# Patient Record
Sex: MALE | Race: WHITE | NOT HISPANIC OR LATINO
[De-identification: names, ages, dates, MRNs, and addresses within clinical notes are randomized per-mention and may not be internally consistent; named-entity substitution may affect disease eponyms.]

---

## 2017-03-07 ENCOUNTER — RX RENEWAL (OUTPATIENT)
Age: 47
End: 2017-03-07

## 2017-03-13 ENCOUNTER — APPOINTMENT (OUTPATIENT)
Dept: INTERNAL MEDICINE | Facility: CLINIC | Age: 47
End: 2017-03-13

## 2017-03-13 VITALS
HEART RATE: 68 BPM | TEMPERATURE: 98.2 F | BODY MASS INDEX: 23.84 KG/M2 | RESPIRATION RATE: 18 BRPM | OXYGEN SATURATION: 95 % | WEIGHT: 176 LBS | HEIGHT: 72 IN | DIASTOLIC BLOOD PRESSURE: 76 MMHG | SYSTOLIC BLOOD PRESSURE: 116 MMHG

## 2017-03-13 DIAGNOSIS — Z86.69 PERSONAL HISTORY OF OTHER DISEASES OF THE NERVOUS SYSTEM AND SENSE ORGANS: ICD-10-CM

## 2017-03-28 ENCOUNTER — RX RENEWAL (OUTPATIENT)
Age: 47
End: 2017-03-28

## 2017-04-12 ENCOUNTER — APPOINTMENT (OUTPATIENT)
Dept: HEMATOLOGY ONCOLOGY | Facility: CLINIC | Age: 47
End: 2017-04-12

## 2017-04-18 ENCOUNTER — APPOINTMENT (OUTPATIENT)
Dept: HEMATOLOGY ONCOLOGY | Facility: CLINIC | Age: 47
End: 2017-04-18

## 2017-04-18 RX ORDER — DOXYCYCLINE HYCLATE 100 MG/1
100 TABLET ORAL TWICE DAILY
Qty: 20 | Refills: 0 | Status: DISCONTINUED | COMMUNITY
End: 2017-04-18

## 2017-04-21 LAB
ALBUMIN SERPL ELPH-MCNC: 4.9 G/DL
ALP BLD-CCNC: 74 U/L
ALT SERPL-CCNC: 44 U/L
ANION GAP SERPL CALC-SCNC: 14 MMOL/L
AST SERPL-CCNC: 29 U/L
BILIRUB SERPL-MCNC: 0.6 MG/DL
BUN SERPL-MCNC: 23 MG/DL
CALCIUM SERPL-MCNC: 10 MG/DL
CHLORIDE SERPL-SCNC: 100 MMOL/L
CO2 SERPL-SCNC: 25 MMOL/L
CREAT SERPL-MCNC: 1.05 MG/DL
GLUCOSE SERPL-MCNC: 94 MG/DL
LDH SERPL-CCNC: 151 U/L
POTASSIUM SERPL-SCNC: 4.7 MMOL/L
PROT SERPL-MCNC: 6.4 G/DL
SODIUM SERPL-SCNC: 139 MMOL/L
URATE SERPL-MCNC: 4.2 MG/DL

## 2017-05-12 ENCOUNTER — APPOINTMENT (OUTPATIENT)
Dept: INTERNAL MEDICINE | Facility: CLINIC | Age: 47
End: 2017-05-12

## 2017-05-12 ENCOUNTER — LABORATORY RESULT (OUTPATIENT)
Age: 47
End: 2017-05-12

## 2017-05-12 VITALS
OXYGEN SATURATION: 96 % | DIASTOLIC BLOOD PRESSURE: 72 MMHG | RESPIRATION RATE: 18 BRPM | SYSTOLIC BLOOD PRESSURE: 120 MMHG | TEMPERATURE: 98.3 F | HEIGHT: 72 IN | BODY MASS INDEX: 23.7 KG/M2 | WEIGHT: 175 LBS | HEART RATE: 62 BPM

## 2017-05-12 DIAGNOSIS — Z87.01 PERSONAL HISTORY OF PNEUMONIA (RECURRENT): ICD-10-CM

## 2017-05-12 DIAGNOSIS — Z20.828 CONTACT WITH AND (SUSPECTED) EXPOSURE TO OTHER VIRAL COMMUNICABLE DISEASES: ICD-10-CM

## 2017-05-12 DIAGNOSIS — R05 COUGH: ICD-10-CM

## 2017-05-12 DIAGNOSIS — Z87.898 PERSONAL HISTORY OF OTHER SPECIFIED CONDITIONS: ICD-10-CM

## 2017-05-12 DIAGNOSIS — M79.1 MYALGIA: ICD-10-CM

## 2017-05-12 RX ORDER — METHYLPREDNISOLONE 4 MG/1
4 TABLET ORAL
Qty: 21 | Refills: 0 | Status: DISCONTINUED | COMMUNITY
Start: 2017-03-13 | End: 2017-05-12

## 2017-05-12 RX ORDER — OSELTAMIVIR PHOSPHATE 75 MG/1
75 CAPSULE ORAL
Qty: 10 | Refills: 0 | Status: DISCONTINUED | COMMUNITY
Start: 2017-03-11 | End: 2017-05-12

## 2017-05-13 LAB
25(OH)D3 SERPL-MCNC: 35.5 NG/ML
ALBUMIN SERPL ELPH-MCNC: 5 G/DL
ALP BLD-CCNC: 72 U/L
ALT SERPL-CCNC: 27 U/L
ANION GAP SERPL CALC-SCNC: 15 MMOL/L
APTT BLD: 32.9 SEC
AST SERPL-CCNC: 23 U/L
BILIRUB SERPL-MCNC: 0.6 MG/DL
BUN SERPL-MCNC: 25 MG/DL
CALCIUM SERPL-MCNC: 9.4 MG/DL
CHLORIDE SERPL-SCNC: 101 MMOL/L
CO2 SERPL-SCNC: 26 MMOL/L
CREAT SERPL-MCNC: 0.89 MG/DL
GLUCOSE SERPL-MCNC: 94 MG/DL
INR PPP: 0.92 RATIO
POTASSIUM SERPL-SCNC: 4.5 MMOL/L
PROT SERPL-MCNC: 6.4 G/DL
PT BLD: 10.4 SEC
SODIUM SERPL-SCNC: 142 MMOL/L

## 2017-05-15 LAB
BASOPHILS # BLD AUTO: 0 K/UL
BASOPHILS NFR BLD AUTO: 0 %
EOSINOPHIL # BLD AUTO: 0 K/UL
EOSINOPHIL NFR BLD AUTO: 0 %
HCT VFR BLD CALC: 43 %
HGB BLD-MCNC: 13.4 G/DL
LYMPHOCYTES # BLD AUTO: 78.23 K/UL
LYMPHOCYTES NFR BLD AUTO: 86 %
MAN DIFF?: YES
MCHC RBC-ENTMCNC: 27.4 PG
MCHC RBC-ENTMCNC: 31.2 GM/DL
MCV RBC AUTO: 87.9 FL
MONOCYTES # BLD AUTO: 0 K/UL
MONOCYTES NFR BLD AUTO: 0 %
NEUTROPHILS # BLD AUTO: 8.19 K/UL
NEUTROPHILS NFR BLD AUTO: 9 %
PLATELET # BLD AUTO: 171 K/UL
RBC # BLD: 4.89 M/UL
RBC # FLD: 14.5 %
WBC # FLD AUTO: 90.96 K/UL

## 2017-05-31 ENCOUNTER — APPOINTMENT (OUTPATIENT)
Dept: INTERNAL MEDICINE | Facility: CLINIC | Age: 47
End: 2017-05-31

## 2017-05-31 VITALS
TEMPERATURE: 98 F | DIASTOLIC BLOOD PRESSURE: 76 MMHG | HEIGHT: 72 IN | SYSTOLIC BLOOD PRESSURE: 132 MMHG | RESPIRATION RATE: 16 BRPM | HEART RATE: 66 BPM | OXYGEN SATURATION: 96 %

## 2017-07-04 ENCOUNTER — RX RENEWAL (OUTPATIENT)
Age: 47
End: 2017-07-04

## 2017-08-27 ENCOUNTER — RX RENEWAL (OUTPATIENT)
Age: 47
End: 2017-08-27

## 2017-10-18 ENCOUNTER — APPOINTMENT (OUTPATIENT)
Dept: HEMATOLOGY ONCOLOGY | Facility: CLINIC | Age: 47
End: 2017-10-18
Payer: COMMERCIAL

## 2017-10-18 VITALS
RESPIRATION RATE: 14 BRPM | WEIGHT: 180 LBS | DIASTOLIC BLOOD PRESSURE: 68 MMHG | OXYGEN SATURATION: 98 % | HEART RATE: 77 BPM | TEMPERATURE: 98 F | SYSTOLIC BLOOD PRESSURE: 114 MMHG | BODY MASS INDEX: 24.41 KG/M2

## 2017-10-18 PROCEDURE — 85025 COMPLETE CBC W/AUTO DIFF WBC: CPT

## 2017-10-18 PROCEDURE — 99215 OFFICE O/P EST HI 40 MIN: CPT

## 2017-10-18 RX ORDER — UBIDECARENONE 50 MG
CAPSULE ORAL
Refills: 0 | Status: ACTIVE | COMMUNITY

## 2017-10-19 LAB
ALBUMIN SERPL ELPH-MCNC: 5.1 G/DL
ALP BLD-CCNC: 73 U/L
ALT SERPL-CCNC: 40 U/L
ANION GAP SERPL CALC-SCNC: 17 MMOL/L
AST SERPL-CCNC: 28 U/L
BILIRUB SERPL-MCNC: 0.6 MG/DL
BUN SERPL-MCNC: 23 MG/DL
CALCIUM SERPL-MCNC: 9.9 MG/DL
CHLORIDE SERPL-SCNC: 104 MMOL/L
CO2 SERPL-SCNC: 21 MMOL/L
CREAT SERPL-MCNC: 1.13 MG/DL
DEPRECATED KAPPA LC FREE/LAMBDA SER: 11.95 RATIO
GLUCOSE SERPL-MCNC: 84 MG/DL
IGA SER QL IEP: 23 MG/DL
IGG SER QL IEP: 371 MG/DL
IGM SER QL IEP: 19 MG/DL
KAPPA LC CSF-MCNC: 0.55 MG/DL
KAPPA LC SERPL-MCNC: 6.57 MG/DL
LDH SERPL-CCNC: 162 U/L
POTASSIUM SERPL-SCNC: 4.6 MMOL/L
PROT SERPL-MCNC: 6.4 G/DL
SODIUM SERPL-SCNC: 142 MMOL/L
URATE SERPL-MCNC: 4.9 MG/DL

## 2017-12-03 ENCOUNTER — RX RENEWAL (OUTPATIENT)
Age: 47
End: 2017-12-03

## 2017-12-04 ENCOUNTER — RX RENEWAL (OUTPATIENT)
Age: 47
End: 2017-12-04

## 2018-02-28 ENCOUNTER — RX RENEWAL (OUTPATIENT)
Age: 48
End: 2018-02-28

## 2018-04-18 ENCOUNTER — APPOINTMENT (OUTPATIENT)
Dept: HEMATOLOGY ONCOLOGY | Facility: CLINIC | Age: 48
End: 2018-04-18
Payer: COMMERCIAL

## 2018-04-18 VITALS
BODY MASS INDEX: 24.28 KG/M2 | WEIGHT: 179 LBS | HEART RATE: 71 BPM | DIASTOLIC BLOOD PRESSURE: 78 MMHG | TEMPERATURE: 97.8 F | SYSTOLIC BLOOD PRESSURE: 112 MMHG | OXYGEN SATURATION: 98 % | RESPIRATION RATE: 14 BRPM

## 2018-04-18 PROCEDURE — 99215 OFFICE O/P EST HI 40 MIN: CPT

## 2018-04-18 PROCEDURE — 85025 COMPLETE CBC W/AUTO DIFF WBC: CPT

## 2018-04-23 LAB
ALBUMIN SERPL ELPH-MCNC: 5 G/DL
ALP BLD-CCNC: 69 U/L
ALT SERPL-CCNC: 32 U/L
ANION GAP SERPL CALC-SCNC: 13 MMOL/L
AST SERPL-CCNC: 27 U/L
BILIRUB SERPL-MCNC: 0.5 MG/DL
BUN SERPL-MCNC: 31 MG/DL
CALCIUM SERPL-MCNC: 9.8 MG/DL
CHLORIDE SERPL-SCNC: 101 MMOL/L
CO2 SERPL-SCNC: 26 MMOL/L
CREAT SERPL-MCNC: 1.15 MG/DL
GLUCOSE SERPL-MCNC: 84 MG/DL
LDH SERPL-CCNC: 149 U/L
POTASSIUM SERPL-SCNC: 4.5 MMOL/L
PROT SERPL-MCNC: 6.5 G/DL
SODIUM SERPL-SCNC: 140 MMOL/L
URATE SERPL-MCNC: 4.4 MG/DL

## 2018-05-20 ENCOUNTER — RX RENEWAL (OUTPATIENT)
Age: 48
End: 2018-05-20

## 2018-05-24 ENCOUNTER — RX RENEWAL (OUTPATIENT)
Age: 48
End: 2018-05-24

## 2018-07-09 ENCOUNTER — RX RENEWAL (OUTPATIENT)
Age: 48
End: 2018-07-09

## 2018-07-10 ENCOUNTER — MEDICATION RENEWAL (OUTPATIENT)
Age: 48
End: 2018-07-10

## 2018-08-07 ENCOUNTER — RX RENEWAL (OUTPATIENT)
Age: 48
End: 2018-08-07

## 2018-08-09 ENCOUNTER — RX RENEWAL (OUTPATIENT)
Age: 48
End: 2018-08-09

## 2018-08-28 ENCOUNTER — NON-APPOINTMENT (OUTPATIENT)
Age: 48
End: 2018-08-28

## 2018-08-28 ENCOUNTER — APPOINTMENT (OUTPATIENT)
Dept: INTERNAL MEDICINE | Facility: CLINIC | Age: 48
End: 2018-08-28
Payer: COMMERCIAL

## 2018-08-28 VITALS
TEMPERATURE: 97.9 F | HEIGHT: 72 IN | BODY MASS INDEX: 24.11 KG/M2 | OXYGEN SATURATION: 97 % | HEART RATE: 59 BPM | SYSTOLIC BLOOD PRESSURE: 116 MMHG | WEIGHT: 178 LBS | DIASTOLIC BLOOD PRESSURE: 80 MMHG | RESPIRATION RATE: 18 BRPM

## 2018-08-28 DIAGNOSIS — Z87.828 PERSONAL HISTORY OF OTHER (HEALED) PHYSICAL INJURY AND TRAUMA: ICD-10-CM

## 2018-08-28 DIAGNOSIS — Z87.2 PERSONAL HISTORY OF DISEASES OF THE SKIN AND SUBCUTANEOUS TISSUE: ICD-10-CM

## 2018-08-28 DIAGNOSIS — J35.1 HYPERTROPHY OF TONSILS: ICD-10-CM

## 2018-08-28 DIAGNOSIS — R06.83 SNORING: ICD-10-CM

## 2018-08-28 DIAGNOSIS — S83.289A OTHER TEAR OF LATERAL MENISCUS, CURRENT INJURY, UNSPECIFIED KNEE, INITIAL ENCOUNTER: ICD-10-CM

## 2018-08-28 DIAGNOSIS — Z01.818 ENCOUNTER FOR OTHER PREPROCEDURAL EXAMINATION: ICD-10-CM

## 2018-08-28 DIAGNOSIS — R94.5 ABNORMAL RESULTS OF LIVER FUNCTION STUDIES: ICD-10-CM

## 2018-08-28 PROCEDURE — 99396 PREV VISIT EST AGE 40-64: CPT

## 2018-08-28 PROCEDURE — 93000 ELECTROCARDIOGRAM COMPLETE: CPT

## 2018-08-30 ENCOUNTER — LABORATORY RESULT (OUTPATIENT)
Age: 48
End: 2018-08-30

## 2018-08-31 LAB
25(OH)D3 SERPL-MCNC: 38.2 NG/ML
ALBUMIN SERPL ELPH-MCNC: 5 G/DL
ALP BLD-CCNC: 76 U/L
ALT SERPL-CCNC: 44 U/L
ANION GAP SERPL CALC-SCNC: 14 MMOL/L
APPEARANCE: CLEAR
AST SERPL-CCNC: 27 U/L
BASOPHILS # BLD AUTO: 0 K/UL
BASOPHILS NFR BLD AUTO: 0 %
BILIRUB SERPL-MCNC: 0.6 MG/DL
BILIRUBIN URINE: NEGATIVE
BLOOD URINE: NEGATIVE
BUN SERPL-MCNC: 23 MG/DL
CALCIUM SERPL-MCNC: 9.9 MG/DL
CHLORIDE SERPL-SCNC: 102 MMOL/L
CHOLEST SERPL-MCNC: 191 MG/DL
CHOLEST/HDLC SERPL: 4 RATIO
CO2 SERPL-SCNC: 26 MMOL/L
COLOR: YELLOW
CREAT SERPL-MCNC: 0.95 MG/DL
CREAT SPEC-SCNC: 188 MG/DL
EOSINOPHIL # BLD AUTO: 0 K/UL
EOSINOPHIL NFR BLD AUTO: 0 %
GLUCOSE QUALITATIVE U: NEGATIVE MG/DL
GLUCOSE SERPL-MCNC: 90 MG/DL
HCT VFR BLD CALC: 41.5 %
HDLC SERPL-MCNC: 48 MG/DL
HGB BLD-MCNC: 12.9 G/DL
KETONES URINE: NEGATIVE
LDLC SERPL CALC-MCNC: 124 MG/DL
LEUKOCYTE ESTERASE URINE: NEGATIVE
LYMPHOCYTES # BLD AUTO: 117.21 K/UL
LYMPHOCYTES NFR BLD AUTO: 75 %
MAN DIFF?: NORMAL
MCHC RBC-ENTMCNC: 27.3 PG
MCHC RBC-ENTMCNC: 31.1 GM/DL
MCV RBC AUTO: 87.9 FL
MICROALBUMIN 24H UR DL<=1MG/L-MCNC: <1.2 MG/DL
MICROALBUMIN/CREAT 24H UR-RTO: NORMAL
MONOCYTES # BLD AUTO: 10.94 K/UL
MONOCYTES NFR BLD AUTO: 7 %
NEUTROPHILS # BLD AUTO: 10.94 K/UL
NEUTROPHILS NFR BLD AUTO: 7 %
NITRITE URINE: NEGATIVE
PH URINE: 5.5
PLATELET # BLD AUTO: 151 K/UL
POTASSIUM SERPL-SCNC: 4.8 MMOL/L
PROT SERPL-MCNC: 6.5 G/DL
PROTEIN URINE: NEGATIVE MG/DL
PSA SERPL-MCNC: 0.72 NG/ML
RBC # BLD: 4.72 M/UL
RBC # FLD: 14.4 %
SODIUM SERPL-SCNC: 142 MMOL/L
SPECIFIC GRAVITY URINE: 1.03
TRIGL SERPL-MCNC: 93 MG/DL
TSH SERPL-ACNC: 2.86 UIU/ML
UROBILINOGEN URINE: NEGATIVE MG/DL
WBC # FLD AUTO: 156.28 K/UL

## 2018-10-09 ENCOUNTER — LABORATORY RESULT (OUTPATIENT)
Age: 48
End: 2018-10-09

## 2018-10-09 ENCOUNTER — APPOINTMENT (OUTPATIENT)
Dept: HEMATOLOGY ONCOLOGY | Facility: CLINIC | Age: 48
End: 2018-10-09
Payer: COMMERCIAL

## 2018-10-09 PROCEDURE — 99215 OFFICE O/P EST HI 40 MIN: CPT

## 2018-10-09 PROCEDURE — 85025 COMPLETE CBC W/AUTO DIFF WBC: CPT

## 2019-02-19 ENCOUNTER — APPOINTMENT (OUTPATIENT)
Dept: INTERNAL MEDICINE | Facility: CLINIC | Age: 49
End: 2019-02-19
Payer: COMMERCIAL

## 2019-02-19 VITALS
HEIGHT: 72 IN | SYSTOLIC BLOOD PRESSURE: 133 MMHG | HEART RATE: 60 BPM | OXYGEN SATURATION: 97 % | BODY MASS INDEX: 23.84 KG/M2 | WEIGHT: 176 LBS | TEMPERATURE: 97.9 F | RESPIRATION RATE: 18 BRPM | DIASTOLIC BLOOD PRESSURE: 78 MMHG

## 2019-02-19 DIAGNOSIS — Z87.09 PERSONAL HISTORY OF OTHER DISEASES OF THE RESPIRATORY SYSTEM: ICD-10-CM

## 2019-02-19 DIAGNOSIS — Z12.5 ENCOUNTER FOR SCREENING FOR MALIGNANT NEOPLASM OF PROSTATE: ICD-10-CM

## 2019-02-19 PROCEDURE — 99214 OFFICE O/P EST MOD 30 MIN: CPT

## 2019-02-20 PROBLEM — Z87.09 HISTORY OF NASAL CONGESTION: Status: RESOLVED | Noted: 2018-08-28 | Resolved: 2019-02-20

## 2019-02-22 ENCOUNTER — LABORATORY RESULT (OUTPATIENT)
Age: 49
End: 2019-02-22

## 2019-02-23 LAB
25(OH)D3 SERPL-MCNC: 39.9 NG/ML
ALBUMIN SERPL ELPH-MCNC: 5 G/DL
ALP BLD-CCNC: 82 U/L
ALT SERPL-CCNC: 29 U/L
ANION GAP SERPL CALC-SCNC: 12 MMOL/L
APPEARANCE: CLEAR
AST SERPL-CCNC: 23 U/L
BASOPHILS # BLD AUTO: 0 K/UL
BASOPHILS NFR BLD AUTO: 0 %
BILIRUB SERPL-MCNC: 0.3 MG/DL
BILIRUBIN URINE: NEGATIVE
BLOOD URINE: NEGATIVE
BUN SERPL-MCNC: 23 MG/DL
CALCIUM SERPL-MCNC: 10 MG/DL
CHLORIDE SERPL-SCNC: 101 MMOL/L
CHOLEST SERPL-MCNC: 171 MG/DL
CHOLEST/HDLC SERPL: 4.5 RATIO
CO2 SERPL-SCNC: 28 MMOL/L
COLOR: YELLOW
CREAT SERPL-MCNC: 1.04 MG/DL
EOSINOPHIL # BLD AUTO: 2.93 K/UL
EOSINOPHIL NFR BLD AUTO: 1.6 %
GLUCOSE QUALITATIVE U: NEGATIVE
GLUCOSE SERPL-MCNC: 84 MG/DL
HCT VFR BLD CALC: 42.2 %
HDLC SERPL-MCNC: 38 MG/DL
HGB BLD-MCNC: 12.4 G/DL
KETONES URINE: NEGATIVE
LDLC SERPL CALC-MCNC: 110 MG/DL
LEUKOCYTE ESTERASE URINE: NEGATIVE
LYMPHOCYTES # BLD AUTO: 166.83 K/UL
LYMPHOCYTES NFR BLD AUTO: 91.1 %
MAN DIFF?: NORMAL
MCHC RBC-ENTMCNC: 26.8 PG
MCHC RBC-ENTMCNC: 29.4 GM/DL
MCV RBC AUTO: 91.3 FL
MONOCYTES # BLD AUTO: 7.51 K/UL
MONOCYTES NFR BLD AUTO: 4.1 %
NEUTROPHILS # BLD AUTO: 5.86 K/UL
NEUTROPHILS NFR BLD AUTO: 3.2 %
NITRITE URINE: NEGATIVE
PH URINE: 6.5
PLATELET # BLD AUTO: 146 K/UL
POTASSIUM SERPL-SCNC: 4.5 MMOL/L
PROT SERPL-MCNC: 6.2 G/DL
PROTEIN URINE: NORMAL
RBC # BLD: 4.62 M/UL
RBC # FLD: 14 %
SODIUM SERPL-SCNC: 141 MMOL/L
SPECIFIC GRAVITY URINE: 1.03
TRIGL SERPL-MCNC: 114 MG/DL
UROBILINOGEN URINE: NORMAL
WBC # FLD AUTO: 183.13 K/UL

## 2019-04-06 ENCOUNTER — INPATIENT (INPATIENT)
Facility: HOSPITAL | Age: 49
LOS: 3 days | Discharge: HOME | DRG: 871 | End: 2019-04-10
Attending: HOSPITALIST | Admitting: HOSPITALIST
Payer: COMMERCIAL

## 2019-04-06 VITALS
HEIGHT: 72 IN | SYSTOLIC BLOOD PRESSURE: 117 MMHG | OXYGEN SATURATION: 96 % | HEART RATE: 90 BPM | DIASTOLIC BLOOD PRESSURE: 81 MMHG | RESPIRATION RATE: 18 BRPM | WEIGHT: 175.05 LBS | TEMPERATURE: 99 F

## 2019-04-06 DIAGNOSIS — R07.89 OTHER CHEST PAIN: ICD-10-CM

## 2019-04-06 DIAGNOSIS — D72.829 ELEVATED WHITE BLOOD CELL COUNT, UNSPECIFIED: ICD-10-CM

## 2019-04-06 DIAGNOSIS — C91.90 LYMPHOID LEUKEMIA, UNSPECIFIED NOT HAVING ACHIEVED REMISSION: ICD-10-CM

## 2019-04-06 DIAGNOSIS — Z29.9 ENCOUNTER FOR PROPHYLACTIC MEASURES, UNSPECIFIED: ICD-10-CM

## 2019-04-06 DIAGNOSIS — R50.9 FEVER, UNSPECIFIED: ICD-10-CM

## 2019-04-06 DIAGNOSIS — E78.5 HYPERLIPIDEMIA, UNSPECIFIED: ICD-10-CM

## 2019-04-06 DIAGNOSIS — F32.9 MAJOR DEPRESSIVE DISORDER, SINGLE EPISODE, UNSPECIFIED: ICD-10-CM

## 2019-04-06 DIAGNOSIS — I10 ESSENTIAL (PRIMARY) HYPERTENSION: ICD-10-CM

## 2019-04-06 LAB
ALBUMIN SERPL ELPH-MCNC: 4.1 G/DL — SIGNIFICANT CHANGE UP (ref 3.3–5)
ALP SERPL-CCNC: 79 U/L — SIGNIFICANT CHANGE UP (ref 40–120)
ALT FLD-CCNC: 40 U/L — SIGNIFICANT CHANGE UP (ref 12–78)
ANION GAP SERPL CALC-SCNC: 9 MMOL/L — SIGNIFICANT CHANGE UP (ref 5–17)
AST SERPL-CCNC: 18 U/L — SIGNIFICANT CHANGE UP (ref 15–37)
BASOPHILS # BLD AUTO: 0 K/UL — SIGNIFICANT CHANGE UP (ref 0–0.2)
BASOPHILS NFR BLD AUTO: 0 % — SIGNIFICANT CHANGE UP (ref 0–2)
BILIRUB SERPL-MCNC: 1.5 MG/DL — HIGH (ref 0.2–1.2)
BUN SERPL-MCNC: 21 MG/DL — SIGNIFICANT CHANGE UP (ref 7–23)
CALCIUM SERPL-MCNC: 8.6 MG/DL — SIGNIFICANT CHANGE UP (ref 8.5–10.1)
CHLORIDE SERPL-SCNC: 101 MMOL/L — SIGNIFICANT CHANGE UP (ref 96–108)
CO2 SERPL-SCNC: 26 MMOL/L — SIGNIFICANT CHANGE UP (ref 22–31)
CREAT SERPL-MCNC: 1.2 MG/DL — SIGNIFICANT CHANGE UP (ref 0.5–1.3)
EOSINOPHIL # BLD AUTO: 0 K/UL — SIGNIFICANT CHANGE UP (ref 0–0.5)
EOSINOPHIL NFR BLD AUTO: 0 % — SIGNIFICANT CHANGE UP (ref 0–6)
FLU A RESULT: SIGNIFICANT CHANGE UP
FLU A RESULT: SIGNIFICANT CHANGE UP
FLUAV AG NPH QL: SIGNIFICANT CHANGE UP
FLUBV AG NPH QL: SIGNIFICANT CHANGE UP
GLUCOSE SERPL-MCNC: 122 MG/DL — HIGH (ref 70–99)
HCT VFR BLD CALC: 36.9 % — LOW (ref 39–50)
HGB BLD-MCNC: 11.2 G/DL — LOW (ref 13–17)
LACTATE SERPL-SCNC: 0.7 MMOL/L — SIGNIFICANT CHANGE UP (ref 0.7–2)
LYMPHOCYTES # BLD AUTO: 134.46 K/UL — HIGH (ref 1–3.3)
LYMPHOCYTES # BLD AUTO: 82 % — HIGH (ref 13–44)
MCHC RBC-ENTMCNC: 26.2 PG — LOW (ref 27–34)
MCHC RBC-ENTMCNC: 30.4 GM/DL — LOW (ref 32–36)
MCV RBC AUTO: 86.2 FL — SIGNIFICANT CHANGE UP (ref 80–100)
MONOCYTES # BLD AUTO: 4.92 K/UL — HIGH (ref 0–0.9)
MONOCYTES NFR BLD AUTO: 3 % — SIGNIFICANT CHANGE UP (ref 2–14)
NEUTROPHILS # BLD AUTO: 19.68 K/UL — HIGH (ref 1.8–7.4)
NEUTROPHILS NFR BLD AUTO: 9 % — LOW (ref 43–77)
NRBC # BLD: SIGNIFICANT CHANGE UP /100 WBCS (ref 0–0)
PLATELET # BLD AUTO: 105 K/UL — LOW (ref 150–400)
POTASSIUM SERPL-MCNC: 4.1 MMOL/L — SIGNIFICANT CHANGE UP (ref 3.5–5.3)
POTASSIUM SERPL-SCNC: 4.1 MMOL/L — SIGNIFICANT CHANGE UP (ref 3.5–5.3)
PROT SERPL-MCNC: 6.2 G/DL — SIGNIFICANT CHANGE UP (ref 6–8.3)
RBC # BLD: 4.28 M/UL — SIGNIFICANT CHANGE UP (ref 4.2–5.8)
RBC # FLD: 13.9 % — SIGNIFICANT CHANGE UP (ref 10.3–14.5)
RSV RESULT: SIGNIFICANT CHANGE UP
RSV RNA RESP QL NAA+PROBE: SIGNIFICANT CHANGE UP
SODIUM SERPL-SCNC: 136 MMOL/L — SIGNIFICANT CHANGE UP (ref 135–145)
WBC # BLD: 163.97 K/UL — CRITICAL HIGH (ref 3.8–10.5)
WBC # FLD AUTO: 163.97 K/UL — CRITICAL HIGH (ref 3.8–10.5)

## 2019-04-06 PROCEDURE — 99285 EMERGENCY DEPT VISIT HI MDM: CPT

## 2019-04-06 PROCEDURE — 93010 ELECTROCARDIOGRAM REPORT: CPT

## 2019-04-06 PROCEDURE — 99223 1ST HOSP IP/OBS HIGH 75: CPT | Mod: GC,AI

## 2019-04-06 PROCEDURE — 71045 X-RAY EXAM CHEST 1 VIEW: CPT | Mod: 26

## 2019-04-06 RX ORDER — KETOROLAC TROMETHAMINE 30 MG/ML
15 SYRINGE (ML) INJECTION ONCE
Qty: 0 | Refills: 0 | Status: DISCONTINUED | OUTPATIENT
Start: 2019-04-06 | End: 2019-04-06

## 2019-04-06 RX ORDER — LISINOPRIL 2.5 MG/1
10 TABLET ORAL DAILY
Qty: 0 | Refills: 0 | Status: DISCONTINUED | OUTPATIENT
Start: 2019-04-06 | End: 2019-04-10

## 2019-04-06 RX ORDER — LIDOCAINE 4 G/100G
1 CREAM TOPICAL ONCE
Qty: 0 | Refills: 0 | Status: COMPLETED | OUTPATIENT
Start: 2019-04-06 | End: 2019-04-06

## 2019-04-06 RX ORDER — ACETAMINOPHEN 500 MG
650 TABLET ORAL ONCE
Qty: 0 | Refills: 0 | Status: DISCONTINUED | OUTPATIENT
Start: 2019-04-06 | End: 2019-04-06

## 2019-04-06 RX ORDER — ACETAMINOPHEN 500 MG
650 TABLET ORAL EVERY 6 HOURS
Qty: 0 | Refills: 0 | Status: DISCONTINUED | OUTPATIENT
Start: 2019-04-06 | End: 2019-04-10

## 2019-04-06 RX ORDER — ONDANSETRON 8 MG/1
4 TABLET, FILM COATED ORAL ONCE
Qty: 0 | Refills: 0 | Status: COMPLETED | OUTPATIENT
Start: 2019-04-06 | End: 2019-04-06

## 2019-04-06 RX ORDER — SODIUM CHLORIDE 9 MG/ML
1000 INJECTION INTRAMUSCULAR; INTRAVENOUS; SUBCUTANEOUS ONCE
Qty: 0 | Refills: 0 | Status: COMPLETED | OUTPATIENT
Start: 2019-04-06 | End: 2019-04-06

## 2019-04-06 RX ORDER — MORPHINE SULFATE 50 MG/1
1 CAPSULE, EXTENDED RELEASE ORAL ONCE
Qty: 0 | Refills: 0 | Status: DISCONTINUED | OUTPATIENT
Start: 2019-04-06 | End: 2019-04-06

## 2019-04-06 RX ORDER — SERTRALINE 25 MG/1
200 TABLET, FILM COATED ORAL DAILY
Qty: 0 | Refills: 0 | Status: DISCONTINUED | OUTPATIENT
Start: 2019-04-06 | End: 2019-04-10

## 2019-04-06 RX ORDER — ATORVASTATIN CALCIUM 80 MG/1
20 TABLET, FILM COATED ORAL AT BEDTIME
Qty: 0 | Refills: 0 | Status: DISCONTINUED | OUTPATIENT
Start: 2019-04-06 | End: 2019-04-10

## 2019-04-06 RX ORDER — CEFTRIAXONE 500 MG/1
1 INJECTION, POWDER, FOR SOLUTION INTRAMUSCULAR; INTRAVENOUS ONCE
Qty: 0 | Refills: 0 | Status: COMPLETED | OUTPATIENT
Start: 2019-04-06 | End: 2019-04-06

## 2019-04-06 RX ORDER — AZITHROMYCIN 500 MG/1
500 TABLET, FILM COATED ORAL ONCE
Qty: 0 | Refills: 0 | Status: COMPLETED | OUTPATIENT
Start: 2019-04-06 | End: 2019-04-06

## 2019-04-06 RX ORDER — ACETAMINOPHEN 500 MG
650 TABLET ORAL ONCE
Qty: 0 | Refills: 0 | Status: COMPLETED | OUTPATIENT
Start: 2019-04-06 | End: 2019-04-06

## 2019-04-06 RX ORDER — HEPARIN SODIUM 5000 [USP'U]/ML
5000 INJECTION INTRAVENOUS; SUBCUTANEOUS EVERY 12 HOURS
Qty: 0 | Refills: 0 | Status: DISCONTINUED | OUTPATIENT
Start: 2019-04-06 | End: 2019-04-10

## 2019-04-06 RX ORDER — SERTRALINE 25 MG/1
200 TABLET, FILM COATED ORAL DAILY
Qty: 0 | Refills: 0 | Status: DISCONTINUED | OUTPATIENT
Start: 2019-04-06 | End: 2019-04-06

## 2019-04-06 RX ORDER — IPRATROPIUM/ALBUTEROL SULFATE 18-103MCG
3 AEROSOL WITH ADAPTER (GRAM) INHALATION ONCE
Qty: 0 | Refills: 0 | Status: COMPLETED | OUTPATIENT
Start: 2019-04-06 | End: 2019-04-06

## 2019-04-06 RX ORDER — FAMOTIDINE 10 MG/ML
20 INJECTION INTRAVENOUS ONCE
Qty: 0 | Refills: 0 | Status: COMPLETED | OUTPATIENT
Start: 2019-04-06 | End: 2019-04-06

## 2019-04-06 RX ADMIN — SODIUM CHLORIDE 1000 MILLILITER(S): 9 INJECTION INTRAMUSCULAR; INTRAVENOUS; SUBCUTANEOUS at 21:15

## 2019-04-06 RX ADMIN — Medication 650 MILLIGRAM(S): at 21:53

## 2019-04-06 RX ADMIN — Medication 15 MILLIGRAM(S): at 20:16

## 2019-04-06 RX ADMIN — ONDANSETRON 4 MILLIGRAM(S): 8 TABLET, FILM COATED ORAL at 23:13

## 2019-04-06 RX ADMIN — MORPHINE SULFATE 1 MILLIGRAM(S): 50 CAPSULE, EXTENDED RELEASE ORAL at 23:12

## 2019-04-06 RX ADMIN — SODIUM CHLORIDE 1000 MILLILITER(S): 9 INJECTION INTRAMUSCULAR; INTRAVENOUS; SUBCUTANEOUS at 20:19

## 2019-04-06 RX ADMIN — FAMOTIDINE 20 MILLIGRAM(S): 10 INJECTION INTRAVENOUS at 20:16

## 2019-04-06 RX ADMIN — LIDOCAINE 1 PATCH: 4 CREAM TOPICAL at 21:24

## 2019-04-06 RX ADMIN — AZITHROMYCIN 255 MILLIGRAM(S): 500 TABLET, FILM COATED ORAL at 23:12

## 2019-04-06 RX ADMIN — SODIUM CHLORIDE 1000 MILLILITER(S): 9 INJECTION INTRAMUSCULAR; INTRAVENOUS; SUBCUTANEOUS at 20:15

## 2019-04-06 RX ADMIN — SODIUM CHLORIDE 1000 MILLILITER(S): 9 INJECTION INTRAMUSCULAR; INTRAVENOUS; SUBCUTANEOUS at 19:19

## 2019-04-06 RX ADMIN — CEFTRIAXONE 100 GRAM(S): 500 INJECTION, POWDER, FOR SOLUTION INTRAMUSCULAR; INTRAVENOUS at 21:23

## 2019-04-06 RX ADMIN — SODIUM CHLORIDE 1000 MILLILITER(S): 9 INJECTION INTRAMUSCULAR; INTRAVENOUS; SUBCUTANEOUS at 21:23

## 2019-04-06 RX ADMIN — Medication 650 MILLIGRAM(S): at 21:23

## 2019-04-06 RX ADMIN — Medication 15 MILLIGRAM(S): at 21:30

## 2019-04-06 NOTE — H&P ADULT - PROBLEM SELECTOR PLAN 1
s/p Tylenol in the ED with improvement in temp  r/u bacteremia  - f/u blood cultures  - f/u urine cx  r/u pneumonia  - f/u chest xray  - s/p Zithromax in the ED, continue for now s/p Tylenol in the ED with improvement in temp  r/u bacteremia  - f/u blood cultures  - f/u urine cx  r/u pneumonia  - f/u chest xray  - s/p Rocephin and currently on Zithromax, continue Zithromax for now s/p Tylenol in the ED with improvement in temp  r/o bacteremia  - f/u blood cultures  - f/u urine cx  r/o pneumonia  - f/u chest xray  - s/p continue Zithromax and rocephin

## 2019-04-06 NOTE — ED PROVIDER NOTE - PROGRESS NOTE DETAILS
patient continues to not feel well, c/o chest discomfort, after 2 liters of fluid., toradol, pepcid, no appetite, noted elevated white count, has h/o CLL, to start abx, r/o bacteremia, sepsis, ekg ordered to be admitted

## 2019-04-06 NOTE — H&P ADULT - PROBLEM SELECTOR PLAN 2
unlikely ACS  chest pain is non-radiating, pinpoint to right chest  EKG- NSR, rate 85  likely related to muscle ache   f/u CE's to be safe unlikely ACS; chest pain is non-radiating, pinpoint to right chest, patient did mention lifting weights this past week.   EKG- NSR, rate 85  likely related to muscle ache   - f/u CE's to be safe unlikely ACS; chest pain is non-radiating, pinpoint to right chest, patient did mention lifting weights this past week.   EKG- NSR, rate 85  likely related to muscle ache   - f/u STAT CE's

## 2019-04-06 NOTE — H&P ADULT - ASSESSMENT
47 yo Male PMH CLL, HTN, HLD, depression presenting with flu like symptoms admitted for fever and leukocytosis unclear source.

## 2019-04-06 NOTE — H&P ADULT - PROBLEM SELECTOR PLAN 8
DVT proph- lovenox?  IMPROVE VTE Individual Risk Assessment          RISK                                                          Points    [  ] Previous VTE                                                3  [  ] Thrombophilia                                             2  [  ] Lower limb paralysis                                   2        (unable to hold up >15 seconds)    [ x ] Current Cancer                                            2         (within 6 months)  [  ] Immobilization > 24 hrs                              1  [  ] ICU/CCU stay > 24 hours                            1  [  ] Age > 60                                                    1    IMPROVE VTE Score ____2_____ DVT proph- 5000 heparin subQ Q12  IMPROVE VTE Individual Risk Assessment          RISK                                                          Points    [  ] Previous VTE                                                3  [  ] Thrombophilia                                             2  [  ] Lower limb paralysis                                   2        (unable to hold up >15 seconds)    [ x ] Current Cancer                                            2         (within 6 months)  [  ] Immobilization > 24 hrs                              1  [  ] ICU/CCU stay > 24 hours                            1  [  ] Age > 60                                                    1    IMPROVE VTE Score ____2_____

## 2019-04-06 NOTE — H&P ADULT - NSHPLABSRESULTS_GEN_ALL_CORE
LABS:                        11.2   163.97 )-----------( 105      ( 06 Apr 2019 18:54 )             36.9     04-06    136  |  101  |  21  ----------------------------<  122<H>  4.1   |  26  |  1.20    Ca    8.6      06 Apr 2019 18:54    TPro  6.2  /  Alb  4.1  /  TBili  1.5<H>  /  DBili  x   /  AST  18  /  ALT  40  /  AlkPhos  79  04-06    LIVER FUNCTIONS - ( 06 Apr 2019 18:54 )  Alb: 4.1 g/dL / Pro: 6.2 g/dL / ALK PHOS: 79 U/L / ALT: 40 U/L / AST: 18 U/L / GGT: x

## 2019-04-06 NOTE — ED PROVIDER NOTE - CARE PLAN
Principal Discharge DX:	Fever, unspecified fever cause  Secondary Diagnosis:	Leukocytosis, unspecified type

## 2019-04-06 NOTE — ED PROVIDER NOTE - ATTENDING CONTRIBUTION TO CARE
I have personally performed a face to face diagnostic evaluation on this patient.  I have reviewed the PA note and agree with the history, exam, and plan of care, except as noted.  History and Exam by me shows sent from urgent care for evaluation of fever, tmax 102F, started yesterday, having nausea, vomited x 1, genralized fatigue, body aches, was strep and flu negative at urgent care, given tylenol and zofran at urgent care, patient alert, heart and lungs clear, abdomen soft, non tender, no rash, h/o CLL no treatment, has elevated white count as normal, f/u labs, iv fluids, pepcid, toradol, re-eval.

## 2019-04-06 NOTE — H&P ADULT - NSHPREVIEWOFSYSTEMS_GEN_ALL_CORE
REVIEW OF SYSTEMS:    CONSTITUTIONAL: + weakness, fevers, and chills  EYES/ENT: No visual changes, + lightheadedness; No vertigo or throat pain   NECK: No pain or stiffness  RESPIRATORY: No cough, wheezing, hemoptysis; No shortness of breath  CARDIOVASCULAR: chest discomfort, no palpitations  GASTROINTESTINAL: No abdominal or epigastric pain. + nausea and 1 episode of vomiting, or hematemesis; No diarrhea or constipation. No melena or hematochezia.  GENITOURINARY: No dysuria, frequency or hematuria  NEUROLOGICAL: No numbness or weakness  SKIN: No itching, rashes

## 2019-04-06 NOTE — ED ADULT NURSE NOTE - NSIMPLEMENTINTERV_GEN_ALL_ED
Implemented All Universal Safety Interventions:  [place] to call system. Call bell, personal items and telephone within reach. Instruct patient to call for assistance. Room bathroom lighting operational. Non-slip footwear when patient is off stretcher. Physically safe environment: no spills, clutter or unnecessary equipment. Stretcher in lowest position, wheels locked, appropriate side rails in place.

## 2019-04-06 NOTE — H&P ADULT - ATTENDING COMMENTS
unclear reason why patient is having fever.  f/u cultures.   evaluate for ACS.  trend trops.  consult cardio AM.

## 2019-04-06 NOTE — H&P ADULT - NSICDXPASTMEDICALHX_GEN_ALL_CORE_FT
PAST MEDICAL HISTORY:  Acute depression     CLL (chronic lymphocytic leukemia)     Hyperlipidemia     Hypertension

## 2019-04-06 NOTE — ED ADULT NURSE NOTE - PSH
After Visit Summary   7/5/2018    Nury Cárdenas    MRN: [id number]           Patient Information     Date Of Birth          1942        Visit Information        Provider Department      7/5/2018 1:08 PM Phil Abbott MD [facility]        Today's Diagnoses     BP check    -  1       Follow-ups after your visit        Your next 10 appointments already scheduled     Jul 16, 2018 11:00 AM CDT   [facility]    [address] [address]   [phone]           Bring a current list of meds and any records pertaining to this visit. For Physicals, please bring immunization records and any forms needing to be filled out. Please arrive 10 minutes early to complete paperwork.            Aug 01, 2018  9:30 AM CDT   Antico[facility] with [facility]Z ANTI COAG   [facility]    [address] [place] [id number] [phone]            Oct 12, 2018 10:30 AM CDT   LAB with LAB FIRST FLOOR [facility] ([facility])    [id number] [place] [id number] [phone]           Please do not eat 10-12 hours before your appointment if you are coming in fasting for labs on lipids, cholesterol, or glucose (sugar). This does not apply to pregnant women. Water, hot tea and black coffee (with nothing added) are okay. Do not drink other fluids, diet soda or chew gum.            Oct 12, 2018 11:00 AM CDT   Return Visit with Alvaro Maguire MD   [facility]    [address] [facility] [id number] [phone]              Who to contact     If you have questions or need follow up information about today's clinic visit or your schedule please contact [facility] directly at [phone].  Normal or non-critical lab and  imaging results will be communicated to you by MyChart, letter or phone within 4 business days after the clinic has received the results. If you do not hear from us within 7 days, please contact the clinic through [place] or phone. If you have a critical or abnormal lab result, we will notify you by phone as soon as possible.  Submit refill requests through [employer] or call your pharmacy and they will forward the refill request to us. Please allow 3 business days for your refill to be completed.          Additional Information About Your Visit        [email or web address] Information     M[employer] gives you secure access to your electronic health record. If you see a primary care provider, you can also send messages to your care team and make appointments. If you have questions, please call your primary care clinic.  If you do not have a primary care provider, please call [phone] and they will assist you.        Care EveryWhere ID     This is your Care EveryWhere ID. This could be used by other organizations to access your [facility] medical records  [id number]         Blood Pressure from Last 3 Encounters:   07/06/18 128/74   07/05/18 128/72   07/03/18 130/80    Weight from Last 3 Encounters:   07/02/18 120 lb 12.8 oz (54.8 kg)   03/12/18 122 lb 3 oz (55.4 kg)   02/23/18 122 lb (55.3 kg)              Today, you had the following     No orders found for display       Primary Care Provider Office Phone # Fax #    Phil Abbott [phone] [address] [place] [address] [address]        Equal Access to Services     MARTÍN MYERS : Hadii abena ku hadasho Soprabhuali, waaxda luqadaha, qaybta kaalmada rocky, lavell angel. So [facility] [phone].    ATENCIÓN: Si habla español, tiene a fofana disposición servicios gratuitos de asistencia lingüística. Llame al [phone].    We comply with applicable federal civil rights laws and [place] laws. We do not discriminate on the basis  of race, color, national origin, age, disability, sex, sexual orientation, or gender identity.            Thank you!     Thank you for choosing [facility]  for your care. Our goal is always to provide you with excellent care. Hearing back from our patients is one way we can continue to improve our services. Please take a few minutes to complete the written survey that you may receive in the mail after your visit with us. Thank you!             Your Updated Medication List - Protect others around you: Learn how to safely use, store and throw away your medicines at www.disposemymeds.org.          This list is accurate as of 7/5/18 11:59 PM.  Always use your most recent med list.                   Brand Name Dispense Instructions for use Diagnosis    acetaminophen 325 MG tablet    TYLENOL    100 tablet    Take 2 tablets (650 mg) by mouth every 6 hours as needed for mild pain    Migraines, Pulmonary emboli (H)       amLODIPine 2.5 MG tablet    NORVASC    30 tablet    Take 1 tablet (2.5 mg) by mouth daily    HTN, goal below 140/90       calcium-magnesium 500-250 MG Tabs per tablet    CALMAG     Take 1 tablet by mouth 2 times daily (with meals)        cholecalciferol 1000 units Tabs      Take 1,000 Units by mouth daily        propranolol 20 MG tablet    INDERAL    90 tablet    TAKE 1 TABLET(20 MG) BY MOUTH DAILY. Follow up for further refills    Migraine without status migrainosus, not intractable, unspecified migraine type       valACYclovir 500 MG tablet    VALTREX    6 tablet    Take 1 tablet (500 mg) by mouth 2 times daily    Herpes genitalis in women       warfarin 2 MG tablet    COUMADIN    180 tablet    TAKE 2 TABLETS(4 MG) BY MOUTH EVERY DAY    Chronic anticoagulation          S/P hernia surgery

## 2019-04-06 NOTE — H&P ADULT - HISTORY OF PRESENT ILLNESS
47 yo Male PMH CLL, HTN, HLD, and depression presenting with 1 day history of fevers, chills, nausea, 1 episode vomiting, and chest discomfort. Patient denies ever feeling like this before, no recent travel. Earlier today, patient went to urgent care and when found to have a fever 102F, was sent to ED. Patient admits to fatigue, lightheadedness, chest discomfort on inhalation, numbness in his fingertips, n/v. According to family, patient has not eaten anything all day.   Patient denies SOB.     In the ED:   vitals: 98.7, 76, 104/66, RR 16, spo2 96% RA  labs significant for .93  Flu A/B/ RSV- negative  EKG- 49 yo Male PMH CLL, HTN, HLD, and depression presenting with 1 day history of fevers, chills, nausea, 1 episode vomiting, and chest discomfort. Patient denies ever feeling like this before, no recent travel. Earlier today, patient went to urgent care and when found to have a fever 102F, was sent to ED. Patient admits to fatigue, lightheadedness, 6/10 chest discomfort upon inhalation, numbness in his fingertips, n/v. According to family, patient has not eaten anything all day.    Patient denies SOB.     In the ED:   vitals: 98.7, 76, 104/66, RR 16, spo2 96% RA  labs significant for .93  s/p 3L NS bolus, zithromax,   Flu A/B/ RSV- negative  EKG- NSR rate 85 47 yo Male PMH CLL, HTN, HLD, and depression presenting with 1 day history of fevers, chills, nausea, 1 episode vomiting, and chest discomfort. Patient denies ever feeling like this before, no recent travel. Earlier today, patient went to urgent care and when found to have a fever 102F, was sent to ED. Patient admits to fatigue, lightheadedness, 6/10 chest discomfort upon inhalation, numbness in his fingertips, n/v. According to family, patient has not eaten anything all day.    Patient denies SOB.     In the ED:   vitals: 98.7, 76, 104/66, RR 16, spo2 96% RA  labs significant for .93  s/p 3L NS bolus, rocephin, and currently on zithroma,   Flu A/B/ RSV- negative  EKG- NSR rate 85

## 2019-04-06 NOTE — ED ADULT NURSE REASSESSMENT NOTE - NSIMPLEMENTINTERV_GEN_ALL_ED
Implemented All Universal Safety Interventions:  [place] to call system. Call bell, personal items and telephone within reach. Instruct patient to call for assistance. Room bathroom lighting operational. Non-slip footwear when patient is off stretcher. Physically safe environment: no spills, clutter or unnecessary equipment. Stretcher in lowest position, wheels locked, appropriate side rails in place.

## 2019-04-06 NOTE — ED ADULT NURSE NOTE - OBJECTIVE STATEMENT
patient comes to ED for evaluation of fever was sent from urgent care where he vomited X1 states fever began last night took Tylenol approx 1 hour ago denies cough denies abd pain

## 2019-04-06 NOTE — ED PROVIDER NOTE - OBJECTIVE STATEMENT
48 male sent from urgent care for evaluation of fever, tmax 102F, started yesterday, having nausea, vomited x 1, genralized fatigue, body aches, was strep and flu negative at urgent care, given tylenol and zofran at urgent care, patient alert, heart and lungs clear, abdomen soft, non tender, no rash, h/o CLL no treatment, has elevated white count as normal. No recent travel, no sick contacts.

## 2019-04-06 NOTE — H&P ADULT - NSHPPHYSICALEXAM_GEN_ALL_CORE
PHYSICAL EXAM:  GENERAL: No acute distress, lethargic  HEAD:  Atraumatic, Normocephalic  EYES: EOMI, conjunctiva and sclera clear  NECK: Supple, no lymphadenopathy, no JVD  CHEST/LUNG: CTAB; No wheezes, rales, or rhonchi  HEART: Regular rate and rhythm; No murmurs, rubs, or gallops  ABDOMEN: Soft, non-tender, non-distended; normal bowel sounds, no organomegaly  EXTREMITIES:  2+ peripheral pulses b/l, No clubbing, cyanosis, or edema  NEUROLOGY: A&O x 3, no focal deficits  SKIN: warm and dry    Vital Signs Last 24 Hrs  T(C): 37.2 (06 Apr 2019 21:28), Max: 37.2 (06 Apr 2019 21:28)  T(F): 99 (06 Apr 2019 21:28), Max: 99 (06 Apr 2019 21:28)  HR: 76 (06 Apr 2019 20:21) (76 - 90)  BP: 104/66 (06 Apr 2019 20:21) (104/66 - 117/81)  BP(mean): --  RR: 16 (06 Apr 2019 20:21) (16 - 18)  SpO2: 96% (06 Apr 2019 20:21) (96% - 96%)

## 2019-04-07 DIAGNOSIS — A41.89 OTHER SPECIFIED SEPSIS: ICD-10-CM

## 2019-04-07 LAB
ALBUMIN SERPL ELPH-MCNC: 3.4 G/DL — SIGNIFICANT CHANGE UP (ref 3.3–5)
ALP SERPL-CCNC: 71 U/L — SIGNIFICANT CHANGE UP (ref 40–120)
ALT FLD-CCNC: 32 U/L — SIGNIFICANT CHANGE UP (ref 12–78)
ANION GAP SERPL CALC-SCNC: 9 MMOL/L — SIGNIFICANT CHANGE UP (ref 5–17)
APPEARANCE UR: CLEAR — SIGNIFICANT CHANGE UP
AST SERPL-CCNC: 15 U/L — SIGNIFICANT CHANGE UP (ref 15–37)
BILIRUB SERPL-MCNC: 1.5 MG/DL — HIGH (ref 0.2–1.2)
BILIRUB UR-MCNC: NEGATIVE — SIGNIFICANT CHANGE UP
BUN SERPL-MCNC: 16 MG/DL — SIGNIFICANT CHANGE UP (ref 7–23)
CALCIUM SERPL-MCNC: 7.6 MG/DL — LOW (ref 8.5–10.1)
CHLORIDE SERPL-SCNC: 105 MMOL/L — SIGNIFICANT CHANGE UP (ref 96–108)
CO2 SERPL-SCNC: 26 MMOL/L — SIGNIFICANT CHANGE UP (ref 22–31)
COLOR SPEC: SIGNIFICANT CHANGE UP
CREAT SERPL-MCNC: 1.2 MG/DL — SIGNIFICANT CHANGE UP (ref 0.5–1.3)
CRP SERPL-MCNC: 26.49 MG/DL — HIGH (ref 0–0.4)
DIFF PNL FLD: NEGATIVE — SIGNIFICANT CHANGE UP
ERYTHROCYTE [SEDIMENTATION RATE] IN BLOOD: 25 MM/HR — HIGH (ref 0–15)
GLUCOSE SERPL-MCNC: 90 MG/DL — SIGNIFICANT CHANGE UP (ref 70–99)
GLUCOSE UR QL: NEGATIVE — SIGNIFICANT CHANGE UP
GRAM STN FLD: SIGNIFICANT CHANGE UP
HCT VFR BLD CALC: 34.5 % — LOW (ref 39–50)
HGB BLD-MCNC: 10.6 G/DL — LOW (ref 13–17)
KETONES UR-MCNC: NEGATIVE — SIGNIFICANT CHANGE UP
LACTATE SERPL-SCNC: 0.9 MMOL/L — SIGNIFICANT CHANGE UP (ref 0.7–2)
LEUKOCYTE ESTERASE UR-ACNC: NEGATIVE — SIGNIFICANT CHANGE UP
MCHC RBC-ENTMCNC: 27 PG — SIGNIFICANT CHANGE UP (ref 27–34)
MCHC RBC-ENTMCNC: 30.7 GM/DL — LOW (ref 32–36)
MCV RBC AUTO: 88 FL — SIGNIFICANT CHANGE UP (ref 80–100)
METHOD TYPE: SIGNIFICANT CHANGE UP
NITRITE UR-MCNC: NEGATIVE — SIGNIFICANT CHANGE UP
NRBC # BLD: 0 /100 WBCS — SIGNIFICANT CHANGE UP (ref 0–0)
PH UR: 6 — SIGNIFICANT CHANGE UP (ref 5–8)
PLATELET # BLD AUTO: 81 K/UL — LOW (ref 150–400)
POTASSIUM SERPL-MCNC: 4.1 MMOL/L — SIGNIFICANT CHANGE UP (ref 3.5–5.3)
POTASSIUM SERPL-SCNC: 4.1 MMOL/L — SIGNIFICANT CHANGE UP (ref 3.5–5.3)
PROT SERPL-MCNC: 5.6 G/DL — LOW (ref 6–8.3)
PROT UR-MCNC: NEGATIVE — SIGNIFICANT CHANGE UP
RAPID RVP RESULT: DETECTED
RBC # BLD: 3.92 M/UL — LOW (ref 4.2–5.8)
RBC # FLD: 14 % — SIGNIFICANT CHANGE UP (ref 10.3–14.5)
RV+EV RNA SPEC QL NAA+PROBE: DETECTED
S PNEUM DNA BLD POS QL NAA+NON-PROBE: SIGNIFICANT CHANGE UP
SODIUM SERPL-SCNC: 140 MMOL/L — SIGNIFICANT CHANGE UP (ref 135–145)
SP GR SPEC: 1 — LOW (ref 1.01–1.02)
UROBILINOGEN FLD QL: NEGATIVE — SIGNIFICANT CHANGE UP
WBC # BLD: 137.84 K/UL — CRITICAL HIGH (ref 3.8–10.5)
WBC # FLD AUTO: 137.84 K/UL — CRITICAL HIGH (ref 3.8–10.5)

## 2019-04-07 PROCEDURE — 74176 CT ABD & PELVIS W/O CONTRAST: CPT | Mod: 26

## 2019-04-07 PROCEDURE — 99233 SBSQ HOSP IP/OBS HIGH 50: CPT

## 2019-04-07 RX ORDER — VANCOMYCIN HCL 1 G
1000 VIAL (EA) INTRAVENOUS EVERY 12 HOURS
Qty: 0 | Refills: 0 | Status: DISCONTINUED | OUTPATIENT
Start: 2019-04-07 | End: 2019-04-07

## 2019-04-07 RX ORDER — ONDANSETRON 8 MG/1
4 TABLET, FILM COATED ORAL EVERY 4 HOURS
Qty: 0 | Refills: 0 | Status: DISCONTINUED | OUTPATIENT
Start: 2019-04-07 | End: 2019-04-07

## 2019-04-07 RX ORDER — VANCOMYCIN HCL 1 G
VIAL (EA) INTRAVENOUS
Qty: 0 | Refills: 0 | Status: DISCONTINUED | OUTPATIENT
Start: 2019-04-07 | End: 2019-04-07

## 2019-04-07 RX ORDER — VANCOMYCIN HCL 1 G
1250 VIAL (EA) INTRAVENOUS EVERY 12 HOURS
Qty: 0 | Refills: 0 | Status: DISCONTINUED | OUTPATIENT
Start: 2019-04-07 | End: 2019-04-08

## 2019-04-07 RX ORDER — CEFTRIAXONE 500 MG/1
1 INJECTION, POWDER, FOR SOLUTION INTRAMUSCULAR; INTRAVENOUS EVERY 24 HOURS
Qty: 0 | Refills: 0 | Status: DISCONTINUED | OUTPATIENT
Start: 2019-04-07 | End: 2019-04-08

## 2019-04-07 RX ORDER — ONDANSETRON 8 MG/1
4 TABLET, FILM COATED ORAL EVERY 4 HOURS
Qty: 0 | Refills: 0 | Status: DISCONTINUED | OUTPATIENT
Start: 2019-04-07 | End: 2019-04-10

## 2019-04-07 RX ORDER — PANTOPRAZOLE SODIUM 20 MG/1
40 TABLET, DELAYED RELEASE ORAL
Qty: 0 | Refills: 0 | Status: DISCONTINUED | OUTPATIENT
Start: 2019-04-07 | End: 2019-04-10

## 2019-04-07 RX ORDER — ACETAMINOPHEN 500 MG
650 TABLET ORAL ONCE
Qty: 0 | Refills: 0 | Status: COMPLETED | OUTPATIENT
Start: 2019-04-07 | End: 2019-04-07

## 2019-04-07 RX ORDER — PIPERACILLIN AND TAZOBACTAM 4; .5 G/20ML; G/20ML
3.38 INJECTION, POWDER, LYOPHILIZED, FOR SOLUTION INTRAVENOUS ONCE
Qty: 0 | Refills: 0 | Status: COMPLETED | OUTPATIENT
Start: 2019-04-07 | End: 2019-04-07

## 2019-04-07 RX ORDER — SODIUM CHLORIDE 9 MG/ML
1000 INJECTION INTRAMUSCULAR; INTRAVENOUS; SUBCUTANEOUS
Qty: 0 | Refills: 0 | Status: DISCONTINUED | OUTPATIENT
Start: 2019-04-07 | End: 2019-04-09

## 2019-04-07 RX ORDER — ONDANSETRON 8 MG/1
4 TABLET, FILM COATED ORAL ONCE
Qty: 0 | Refills: 0 | Status: COMPLETED | OUTPATIENT
Start: 2019-04-07 | End: 2019-04-07

## 2019-04-07 RX ORDER — VANCOMYCIN HCL 1 G
1000 VIAL (EA) INTRAVENOUS ONCE
Qty: 0 | Refills: 0 | Status: COMPLETED | OUTPATIENT
Start: 2019-04-07 | End: 2019-04-07

## 2019-04-07 RX ORDER — IBUPROFEN 200 MG
600 TABLET ORAL EVERY 6 HOURS
Qty: 0 | Refills: 0 | Status: DISCONTINUED | OUTPATIENT
Start: 2019-04-07 | End: 2019-04-10

## 2019-04-07 RX ORDER — INFLUENZA VIRUS VACCINE 15; 15; 15; 15 UG/.5ML; UG/.5ML; UG/.5ML; UG/.5ML
0.5 SUSPENSION INTRAMUSCULAR ONCE
Qty: 0 | Refills: 0 | Status: DISCONTINUED | OUTPATIENT
Start: 2019-04-07 | End: 2019-04-10

## 2019-04-07 RX ADMIN — Medication 166.67 MILLIGRAM(S): at 22:02

## 2019-04-07 RX ADMIN — HEPARIN SODIUM 5000 UNIT(S): 5000 INJECTION INTRAVENOUS; SUBCUTANEOUS at 05:08

## 2019-04-07 RX ADMIN — CEFTRIAXONE 100 GRAM(S): 500 INJECTION, POWDER, FOR SOLUTION INTRAMUSCULAR; INTRAVENOUS at 16:40

## 2019-04-07 RX ADMIN — LIDOCAINE 1 PATCH: 4 CREAM TOPICAL at 05:10

## 2019-04-07 RX ADMIN — LIDOCAINE 1 PATCH: 4 CREAM TOPICAL at 10:29

## 2019-04-07 RX ADMIN — Medication 650 MILLIGRAM(S): at 04:05

## 2019-04-07 RX ADMIN — PANTOPRAZOLE SODIUM 40 MILLIGRAM(S): 20 TABLET, DELAYED RELEASE ORAL at 13:23

## 2019-04-07 RX ADMIN — PIPERACILLIN AND TAZOBACTAM 200 GRAM(S): 4; .5 INJECTION, POWDER, LYOPHILIZED, FOR SOLUTION INTRAVENOUS at 10:43

## 2019-04-07 RX ADMIN — SERTRALINE 200 MILLIGRAM(S): 25 TABLET, FILM COATED ORAL at 11:34

## 2019-04-07 RX ADMIN — HEPARIN SODIUM 5000 UNIT(S): 5000 INJECTION INTRAVENOUS; SUBCUTANEOUS at 17:05

## 2019-04-07 RX ADMIN — SODIUM CHLORIDE 100 MILLILITER(S): 9 INJECTION INTRAMUSCULAR; INTRAVENOUS; SUBCUTANEOUS at 13:11

## 2019-04-07 RX ADMIN — Medication 250 MILLIGRAM(S): at 10:44

## 2019-04-07 RX ADMIN — ONDANSETRON 4 MILLIGRAM(S): 8 TABLET, FILM COATED ORAL at 13:11

## 2019-04-07 RX ADMIN — Medication 650 MILLIGRAM(S): at 02:31

## 2019-04-07 RX ADMIN — Medication 650 MILLIGRAM(S): at 04:56

## 2019-04-07 RX ADMIN — ATORVASTATIN CALCIUM 20 MILLIGRAM(S): 80 TABLET, FILM COATED ORAL at 21:13

## 2019-04-07 RX ADMIN — Medication 600 MILLIGRAM(S): at 18:28

## 2019-04-07 RX ADMIN — Medication 650 MILLIGRAM(S): at 01:03

## 2019-04-07 RX ADMIN — MORPHINE SULFATE 1 MILLIGRAM(S): 50 CAPSULE, EXTENDED RELEASE ORAL at 00:12

## 2019-04-07 NOTE — ED ADULT NURSE REASSESSMENT NOTE - NS ED NURSE REASSESS COMMENT FT1
Dr posey made aware of the temp, new order for tylenol obtained
Report taken from Ford MALIK RN. Pt received alert and oriented x 4 c/o body aches. Pt verbalized improvement in bodyaches, but remains 5/10, toradol 15 mg administered. No acute distress. vss. 20 g iv noted to left forearm.

## 2019-04-07 NOTE — PROGRESS NOTE ADULT - ASSESSMENT
47 yo Male PMH CLL, HTN, HLD, depression presenting with flu like symptoms admitted for fever and leukocytosis unclear source an dwa mauricio fond ot have pgram positive septicemia with rvp panel posiitve and likely CAP due ot immunecompromised state.    ID consulted (dr edouard)

## 2019-04-07 NOTE — PHARMACOTHERAPY INTERVENTION NOTE - COMMENTS
Patient started on Vanco for fevers of unknown origin (has h/o CLL). Recommended increasing dose to 1250mg Q12hrs based on weight and renal function. Also recommended adding pre-4th dose trough. Accepted. Patient has duplicate orders for prn fever (APAP and ibuprofen). S/w provider to adjust duplicate orders.

## 2019-04-07 NOTE — CHART NOTE - NSCHARTNOTEFT_GEN_A_CORE
PGY-1 Service Note    Called by RN due to patient desaturating to 88% on RA. Patient seen and examined at bedside. Patient reports R sided pleuritic pain with inhalation which he has had since admission. Patient denies shortness of breath or other complaints.    T(F): 98.2 (04-07-19 @ 20:13), Max: 98.2 (04-07-19 @ 20:13)  HR: 88 (04-07-19 @ 20:13) (88 - 94)  BP: 134/70 (04-07-19 @ 20:13) (106/71 - 134/70)  RR: 20 (04-07-19 @ 20:13) (19 - 20)  SpO2: 95% (04-07-19 @ 22:55) (88% - 95%)    Physical Exam:  General: no acute distress  HEENT: NCAT, PERRL  Cardio: +S1/S2, regular rate and rhythm  Lungs: coarse breath sounds on R side, no accessory muscle usage  Abd: soft, nontender, nondistended, +BS x 4 quadrants  Ext: no pedal edema, no calf tenderness                          10.6   137.84 )-----------( 81       ( 07 Apr 2019 05:50 )             34.5     04-07    140  |  105  |  16  ----------------------------<  90  4.1   |  26  |  1.20    Ca    7.6<L>      07 Apr 2019 05:50    TPro  5.6<L>  /  Alb  3.4  /  TBili  1.5<H>  /  DBili  x   /  AST  15  /  ALT  32  /  AlkPhos  71  04-07      A/P: 47 yo Male PMH CLL, HTN, HLD, depression admitted for fever and leukocytosis unclear source, found to have gram positive septicemia with RVP panel positive and likely CAP due to immunocompromised state, now with SpO2 88%.  - Ordered supplemental O2 by NC 2 L as needed, titrate as tolerated.  - SpO2 improved to 95% on NC.  - RN to call if any changes.

## 2019-04-07 NOTE — PROGRESS NOTE ADULT - PROBLEM SELECTOR PLAN 2
unlikely ACS; chest pain is non-radiating, pinpoint to right chest, patient did mention lifting weights this past week.   EKG- NSR, rate 85  likely related to muscle ache

## 2019-04-07 NOTE — PROGRESS NOTE ADULT - SUBJECTIVE AND OBJECTIVE BOX
INTERVAL HPI/OVERNIGHT EVENTS:   Patient seen and examined. pt around 12pm ha dblood cx positive and was alrwady strate don vancp. id called for eval. pt has repeated episodes of chills. cxr in am ct ap now    MEDICATIONS  (STANDING):  atorvastatin 20 milliGRAM(s) Oral at bedtime  cefTRIAXone   IVPB 1 Gram(s) IV Intermittent every 24 hours  heparin  Injectable 5000 Unit(s) SubCutaneous every 12 hours  lisinopril 10 milliGRAM(s) Oral daily  ondansetron Injectable 4 milliGRAM(s) IV Push every 4 hours  pantoprazole    Tablet 40 milliGRAM(s) Oral before breakfast  sertraline 200 milliGRAM(s) Oral daily  sodium chloride 0.9%. 1000 milliLiter(s) (100 mL/Hr) IV Continuous <Continuous>  vancomycin  IVPB 1250 milliGRAM(s) IV Intermittent every 12 hours    MEDICATIONS  (PRN):  acetaminophen   Tablet .. 650 milliGRAM(s) Oral every 6 hours PRN Temp greater or equal to 38C (100.4F)  ibuprofen  Tablet. 600 milliGRAM(s) Oral every 6 hours PRN Mild Pain (1 - 3), Moderate Pain (4 - 6)      REVIEW OF SYSTEMS:  See HPI,  all others negative    PHYSICAL EXAM:  Vital Signs Last 24 Hrs  T(C): 36.9 (2019 08:54), Max: 38.5 (2019 00:52)  T(F): 98.5 (2019 08:54), Max: 101.3 (2019 00:52)  HR: 91 (2019 08:54) (76 - 93)  BP: 109/65 (2019 08:54) (100/55 - 117/81)  BP(mean): --  RR: 24 (2019 08:54) (16 - 30)  SpO2: 91% (2019 08:54) (91% - 99%)    GENERAL: NAD, has chills  HEAD:  Atraumatic, Normocephalic  EYES: EOMI, PERRLA, conjunctiva and sclera clear  ENMT: No tonsillar erythema, exudates, or enlargement; Moist mucous membranes, Good dentition, No lesions  NECK: Supple, No JVD, No Cervical LAD, No thyromegaly, No thyroid nodules felt  NERVOUS SYSTEM:  Good concentration; Moving all 4 extremities; No gross sensory deficits, No facial droop  CHEST WALL: No masses  CHEST/LUNG: rales in rml and rll   ABDOMEN: Soft, Nontender, Nondistended, Bowel sounds present, No palpable masses or organomegaly, No bruits  EXTREMITIES:  2+ Peripheral Pulses, No clubbing, cyanosis, or edema  LYMPH: No lymphadenopathy  SKIN: No rashes or lesions    LABS:                        10.6   137.84 )-----------( 81       ( 2019 05:50 )             34.5     2019 05:50    140    |  105    |  16     ----------------------------<  90     4.1     |  26     |  1.20     Ca    7.6        2019 05:50    TPro  5.6    /  Alb  3.4    /  TBili  1.5    /  DBili  x      /  AST  15     /  ALT  32     /  AlkPhos  71     2019 05:50      Urinalysis Basic - ( 2019 13:05 )    Color: Pale Yellow / Appearance: Clear / S.005 / pH: x  Gluc: x / Ketone: Negative  / Bili: Negative / Urobili: Negative   Blood: x / Protein: Negative / Nitrite: Negative   Leuk Esterase: Negative / RBC: x / WBC x   Sq Epi: x / Non Sq Epi: x / Bacteria: x         RADIOLOGY & ADDITIONAL TESTS:

## 2019-04-07 NOTE — PROGRESS NOTE ADULT - PROBLEM SELECTOR PLAN 1
start pt on IVF  STAT 2 sets of cultures repeated  IV vanco and rocephin, vanc first dose now  Spoke ot dr edouard with id who will follow  cxr in am  legionella ag and immunoglobin panel ordered  will get ct ap without ocntrast to r/o abscess

## 2019-04-08 ENCOUNTER — CHART COPY (OUTPATIENT)
Age: 49
End: 2019-04-08

## 2019-04-08 LAB
ANION GAP SERPL CALC-SCNC: 7 MMOL/L — SIGNIFICANT CHANGE UP (ref 5–17)
BASOPHILS # BLD AUTO: 0 K/UL — SIGNIFICANT CHANGE UP (ref 0–0.2)
BASOPHILS NFR BLD AUTO: 0 % — SIGNIFICANT CHANGE UP (ref 0–2)
BUN SERPL-MCNC: 15 MG/DL — SIGNIFICANT CHANGE UP (ref 7–23)
CALCIUM SERPL-MCNC: 8.1 MG/DL — LOW (ref 8.5–10.1)
CHLORIDE SERPL-SCNC: 109 MMOL/L — HIGH (ref 96–108)
CO2 SERPL-SCNC: 26 MMOL/L — SIGNIFICANT CHANGE UP (ref 22–31)
CREAT SERPL-MCNC: 0.95 MG/DL — SIGNIFICANT CHANGE UP (ref 0.5–1.3)
CULTURE RESULTS: NO GROWTH — SIGNIFICANT CHANGE UP
EOSINOPHIL # BLD AUTO: 0 K/UL — SIGNIFICANT CHANGE UP (ref 0–0.5)
EOSINOPHIL NFR BLD AUTO: 0 % — SIGNIFICANT CHANGE UP (ref 0–6)
GLUCOSE SERPL-MCNC: 91 MG/DL — SIGNIFICANT CHANGE UP (ref 70–99)
HCT VFR BLD CALC: 34.6 % — LOW (ref 39–50)
HGB BLD-MCNC: 10.7 G/DL — LOW (ref 13–17)
IGA FLD-MCNC: 8 MG/DL — LOW (ref 84–499)
IGG FLD-MCNC: 218 MG/DL — LOW (ref 610–1660)
IGM SERPL-MCNC: <10 MG/DL — LOW (ref 35–242)
KAPPA LC SER QL IFE: 4.98 MG/DL — HIGH (ref 0.33–1.94)
KAPPA/LAMBDA FREE LIGHT CHAIN RATIO, SERUM: 16.06 RATIO — HIGH (ref 0.26–1.65)
LAMBDA LC SER QL IFE: 0.31 MG/DL — LOW (ref 0.57–2.63)
LEGIONELLA AG UR QL: NEGATIVE — SIGNIFICANT CHANGE UP
LYMPHOCYTES # BLD AUTO: 81 % — HIGH (ref 13–44)
LYMPHOCYTES # BLD AUTO: 96.33 K/UL — HIGH (ref 1–3.3)
LYMPHOCYTES # SPEC AUTO: 1 % — HIGH (ref 0–0)
MANUAL SMEAR VERIFICATION: SIGNIFICANT CHANGE UP
MCHC RBC-ENTMCNC: 26.9 PG — LOW (ref 27–34)
MCHC RBC-ENTMCNC: 30.9 GM/DL — LOW (ref 32–36)
MCV RBC AUTO: 86.9 FL — SIGNIFICANT CHANGE UP (ref 80–100)
MONOCYTES # BLD AUTO: 3.57 K/UL — HIGH (ref 0–0.9)
MONOCYTES NFR BLD AUTO: 3 % — SIGNIFICANT CHANGE UP (ref 2–14)
NEUTROPHILS # BLD AUTO: 14.27 K/UL — HIGH (ref 1.8–7.4)
NEUTROPHILS NFR BLD AUTO: 11 % — LOW (ref 43–77)
NEUTS BAND # BLD: 1 % — SIGNIFICANT CHANGE UP (ref 0–8)
NRBC # BLD: 0 — SIGNIFICANT CHANGE UP
NRBC # BLD: SIGNIFICANT CHANGE UP /100 WBCS (ref 0–0)
PLAT MORPH BLD: NORMAL — SIGNIFICANT CHANGE UP
PLATELET # BLD AUTO: 87 K/UL — LOW (ref 150–400)
POTASSIUM SERPL-MCNC: 4 MMOL/L — SIGNIFICANT CHANGE UP (ref 3.5–5.3)
POTASSIUM SERPL-SCNC: 4 MMOL/L — SIGNIFICANT CHANGE UP (ref 3.5–5.3)
RBC # BLD: 3.98 M/UL — LOW (ref 4.2–5.8)
RBC # FLD: 14.4 % — SIGNIFICANT CHANGE UP (ref 10.3–14.5)
RBC BLD AUTO: SIGNIFICANT CHANGE UP
S PNEUM AG SER QL: SIGNIFICANT CHANGE UP
SMUDGE CELLS # BLD: PRESENT — SIGNIFICANT CHANGE UP
SODIUM SERPL-SCNC: 142 MMOL/L — SIGNIFICANT CHANGE UP (ref 135–145)
SPECIMEN SOURCE: SIGNIFICANT CHANGE UP
VARIANT LYMPHS # BLD: 3 % — SIGNIFICANT CHANGE UP (ref 0–6)
WBC # BLD: 118.92 K/UL — CRITICAL HIGH (ref 3.8–10.5)
WBC # FLD AUTO: 118.92 K/UL — CRITICAL HIGH (ref 3.8–10.5)

## 2019-04-08 PROCEDURE — 99232 SBSQ HOSP IP/OBS MODERATE 35: CPT

## 2019-04-08 PROCEDURE — 71046 X-RAY EXAM CHEST 2 VIEWS: CPT | Mod: 26

## 2019-04-08 RX ORDER — LANOLIN ALCOHOL/MO/W.PET/CERES
5 CREAM (GRAM) TOPICAL ONCE
Qty: 0 | Refills: 0 | Status: COMPLETED | OUTPATIENT
Start: 2019-04-08 | End: 2019-04-08

## 2019-04-08 RX ORDER — DEXAMETHASONE 0.5 MG/5ML
10 ELIXIR ORAL ONCE
Qty: 0 | Refills: 0 | Status: COMPLETED | OUTPATIENT
Start: 2019-04-08 | End: 2019-04-08

## 2019-04-08 RX ORDER — ACETAMINOPHEN 500 MG
650 TABLET ORAL ONCE
Qty: 0 | Refills: 0 | Status: COMPLETED | OUTPATIENT
Start: 2019-04-08 | End: 2019-04-08

## 2019-04-08 RX ORDER — DIPHENHYDRAMINE HCL 50 MG
50 CAPSULE ORAL ONCE
Qty: 0 | Refills: 0 | Status: COMPLETED | OUTPATIENT
Start: 2019-04-08 | End: 2019-04-08

## 2019-04-08 RX ORDER — IMMUNE GLOBULIN (HUMAN) 10 G/100ML
30 INJECTION INTRAVENOUS; SUBCUTANEOUS ONCE
Qty: 0 | Refills: 0 | Status: DISCONTINUED | OUTPATIENT
Start: 2019-04-08 | End: 2019-04-08

## 2019-04-08 RX ORDER — IMMUNE GLOBULIN,GAMMA(IGG) 5 %
30 VIAL (ML) INTRAVENOUS ONCE
Qty: 0 | Refills: 0 | Status: COMPLETED | OUTPATIENT
Start: 2019-04-08 | End: 2019-04-08

## 2019-04-08 RX ADMIN — Medication 5 MILLIGRAM(S): at 23:29

## 2019-04-08 RX ADMIN — ATORVASTATIN CALCIUM 20 MILLIGRAM(S): 80 TABLET, FILM COATED ORAL at 21:37

## 2019-04-08 RX ADMIN — Medication 50 MILLIGRAM(S): at 18:36

## 2019-04-08 RX ADMIN — HEPARIN SODIUM 5000 UNIT(S): 5000 INJECTION INTRAVENOUS; SUBCUTANEOUS at 06:31

## 2019-04-08 RX ADMIN — Medication 650 MILLIGRAM(S): at 19:02

## 2019-04-08 RX ADMIN — Medication 166.67 MILLIGRAM(S): at 11:07

## 2019-04-08 RX ADMIN — Medication 650 MILLIGRAM(S): at 18:01

## 2019-04-08 RX ADMIN — Medication 650 MILLIGRAM(S): at 13:19

## 2019-04-08 RX ADMIN — Medication 100 GRAM(S): at 18:41

## 2019-04-08 RX ADMIN — Medication 102 MILLIGRAM(S): at 18:02

## 2019-04-08 RX ADMIN — LISINOPRIL 10 MILLIGRAM(S): 2.5 TABLET ORAL at 06:31

## 2019-04-08 RX ADMIN — Medication 650 MILLIGRAM(S): at 14:30

## 2019-04-08 RX ADMIN — SERTRALINE 200 MILLIGRAM(S): 25 TABLET, FILM COATED ORAL at 11:18

## 2019-04-08 RX ADMIN — PANTOPRAZOLE SODIUM 40 MILLIGRAM(S): 20 TABLET, DELAYED RELEASE ORAL at 06:31

## 2019-04-08 RX ADMIN — HEPARIN SODIUM 5000 UNIT(S): 5000 INJECTION INTRAVENOUS; SUBCUTANEOUS at 18:07

## 2019-04-08 NOTE — CONSULT NOTE ADULT - SUBJECTIVE AND OBJECTIVE BOX
Patient is a 48y old  Male who presents with a chief complaint of flu like symptoms (08 Apr 2019 10:04)      HPI:  47 yo Male PMH CLL (CD38+ un-mutated, diagnosed in 2006 and followed every 6 months at [facility] with Dr. Cece Soares and Dr. Chace Meier), has never been any treatment and has always had well preserved Hg and platelet counts, developed fever 102 on 4/6/19.  He was evaluated at urgent care and had dry heaves, chills and chest discomfort and was referred to the ER at [facility].  In ER underwent Abd/Plv which showed    Patient denies any sick-contacts but does reports that he previously has had pneumonia and cellulitis; he is not on IVIG.    Work-up thus far has revealed Gram + cocci in pairs and chains in 4/4 bottles, CT abd/plv showed  some hepatomegally, intra-abdominal LAD and 1.2 cm lesion at head of the pancreas.  Additionally he was noted to have RLL, LLL and RML infiltrate c/w pneumonia  He was started on IV antibiotics and per patient and wife, has been afebrile for last 24 hours.    Asked by primary team to evaluate      ROS:  Negative except for: fatigue, malaise, fever, chills as per HPI; denies travel and sick contacts    PAST MEDICAL & SURGICAL HISTORY:  Hyperlipidemia  Hypertension  Depression and anxiety  CLL (chronic lymphocytic leukemia)  S/P hernia surgery      SOCIAL HISTORY:  works as  of [employer]  denies tobacco use  drinks 1-2 drinks per week  lives at home with wife    FAMILY HISTORY:  Mother with CLL    MEDICATIONS  (STANDING):  atorvastatin 20 milliGRAM(s) Oral at bedtime  cefTRIAXone   IVPB 1 Gram(s) IV Intermittent every 24 hours  heparin  Injectable 5000 Unit(s) SubCutaneous every 12 hours  influenza   Vaccine 0.5 milliLiter(s) IntraMuscular once  lisinopril 10 milliGRAM(s) Oral daily  pantoprazole    Tablet 40 milliGRAM(s) Oral before breakfast  sertraline 200 milliGRAM(s) Oral daily  sodium chloride 0.9%. 1000 milliLiter(s) (100 mL/Hr) IV Continuous <Continuous>  vancomycin  IVPB 1250 milliGRAM(s) IV Intermittent every 12 hours    MEDICATIONS  (PRN):  acetaminophen   Tablet .. 650 milliGRAM(s) Oral every 6 hours PRN Temp greater or equal to 38C (100.4F)  ibuprofen  Tablet. 600 milliGRAM(s) Oral every 6 hours PRN Mild Pain (1 - 3), Moderate Pain (4 - 6)  ondansetron Injectable 4 milliGRAM(s) IV Push every 4 hours PRN Nausea and/or Vomiting      Allergies    No Known Allergies    Intolerances        Vital Signs Last 24 Hrs  T(C): 36.8 (08 Apr 2019 05:38), Max: 37.1 (07 Apr 2019 15:44)  T(F): 98.2 (08 Apr 2019 05:38), Max: 98.7 (07 Apr 2019 15:44)  HR: 90 (08 Apr 2019 05:38) (82 - 94)  BP: 129/72 (08 Apr 2019 05:38) (106/71 - 135/60)  RR: 19 (08 Apr 2019 05:38) (19 - 22)  SpO2: 90% (08 Apr 2019 05:38) (88% - 95%)    PHYSICAL EXAM  General: adult in NAD  HEENT: clear oropharynx, anicteric sclera, pink conjunctivae  Neck: supple  CV: normal S1S2 with no murmur rubs or gallops  Lungs: clear to auscultation, no wheezes, no rhales  Abdomen: soft non-tender; liver edge plapated  LN - + palpable supraclavicular LN  Ext: no clubbing cyanosis or edema  Skin: no rashes and no petichiae  Neuro: alert and oriented X3 no focal deficits      LABS:    CBC Full  -  ( 08 Apr 2019 09:30 )  WBC Count : 118.92 K/uL  RBC Count : 3.98 M/uL  Hemoglobin : 10.7 g/dL  Hematocrit : 34.6 %  Platelet Count - Automated : 87 K/uL  Mean Cell Volume : 86.9 fl  Mean Cell Hemoglobin : 26.9 pg  Mean Cell Hemoglobin Concentration : 30.9 gm/dL    Hemoglobin: 10.7 g/dL (04-08 @ 09:30)  Hemoglobin: 10.6 g/dL (04-07 @ 05:50)  Hemoglobin: 11.2 g/dL (04-06 @ 18:54)    Platelet Count - Automated: 87 K/uL (04-08 @ 09:30)  Platelet Count - Automated: 81 K/uL (04-07 @ 05:50)  Platelet Count - Automated: 105 K/uL (04-06 @ 18:54)    WBC Count: 118.92 K/uL (04-08 @ 09:30)  WBC Count: 137.84 K/uL (04-07 @ 05:50)  WBC Count: 163.97 K/uL (04-06 @ 18:54)      04-08    142  |  109<H>  |  15  ----------------------------<  91  4.0   |  26  |  0.95    Ca    8.1<L>      08 Apr 2019 09:30    TPro  5.6<L>  /  Alb  3.4  /  TBili  1.5<H>  /  DBili  x   /  AST  15  /  ALT  32  /  AlkPhos  71  04-07    BLOOD SMEAR INTERPRETATION:    * RBC - normocytic, normochromic, no anisiocytosis or poikiolocytosis  * WBC - predominantly large and medium sized lymphocytes, rare smudge cells; + rare neutrophils with toxic granules  * Platelets - normal in morphology ; manual count 100K    RADIOLOGY :  < from: CT Abdomen and Pelvis No Cont (04.07.19 @ 16:33) >    IMPRESSION:    1. Hepatosplenomegaly. Extensive mesenteric lymphadenopathy.   Retroperitoneal para-aortic, pelvic and bilateral iliac lymphadenopathy   noted. 1.2 cm hypodense focus identified in the region of the pancreas   head. Further evaluation with contrast-enhanced CT evaluation of the   abdomen and pelvis (both oral and IV contrast) is recommended.  2.Consolidative changes identified within the visualized portions of the   right middle lobe, right lower lobe and left lower lobe lung parenchyma.  3. Expansion of the right iliac bone is identified within underlying   permeative lesion suggested without evidence for cortical interruption.   An indeterminate sclerotic focus is identified within the left iliac   bone. Further evaluation with MRI of the osseous pelvis recommended.    < end of copied text >
Infectious Diseases Consult by Praveen Hall MD    Reason for Consult : Gram positive bacteremia with CA     HPI:  47 yo Male PMH CLL x 10 years  not on any medications,  HTN, HLD, and depression presenting with 2 day history of fevers, chills, generalized malaise and nausea, 1 episode vomiting, and chest discomfort. Patient denies ever feeling like this before, no recent travel. Earlier today, patient went to urgent care and when found to have a fever 102F, was sent to ED. Patient admits to fatigue, lightheadedness, 6/10 chest discomfort upon inhalation, numbness in his fingertips, n/v. According to family, patient has not eaten anything all day.    Patient denies SOB.     In the ED:   vitals: 98.7, 76, 104/66, RR 16, spo2 96% RA  labs significant for .93  s/p 3L NS bolus, Rocephin and currently on Zithromax   Flu A/B/ RSV- negative  EKG- NSR rate 85 (06 Apr 2019 22:06)    Blood cs done on admission 2/2 reported positive for Gram positive cocci in pairs and chains ,, CXR positive for possible RML pneumonia     Past Medical & Surgical Hx:  PAST MEDICAL & SURGICAL HISTORY:  Hyperlipidemia  Hypertension  Acute depression  CLL (chronic lymphocytic leukemia)  S/P hernia surgery      Social History--  works for [employer]   EtOH: socially   Tobacco: denies   Drug Use: denies       FAMILY HISTORY:  Mother has CLL     Allergies    No Known Allergies    Intolerances        Home/ Out patient  Medications :  Home Medications:  atorvastatin 20 mg oral tablet: 1 tab(s) orally once a day (06 Apr 2019 22:15)  lisinopril 10 mg oral tablet: 1 tab(s) orally once a day (06 Apr 2019 22:15)  Zoloft: 200 milligram(s) orally once a day (06 Apr 2019 22:15)      Current Inpatient Medications :    ANTIBIOTICS:   vancomycin  IVPB 1250 milliGRAM(s) IV Intermittent every 12 hours      OTHER RELEVANT MEDICATIONS :  acetaminophen   Tablet .. 650 milliGRAM(s) Oral every 6 hours PRN  atorvastatin 20 milliGRAM(s) Oral at bedtime  heparin  Injectable 5000 Unit(s) SubCutaneous every 12 hours  ibuprofen  Tablet. 600 milliGRAM(s) Oral every 6 hours PRN  lisinopril 10 milliGRAM(s) Oral daily  sertraline 200 milliGRAM(s) Oral daily      ROS:  CONSTITUTIONAL:  Positive for  fever or chills, feels weak  good appetite  EYES:  Negative  blurry vision or double vision  CARDIOVASCULAR:  Negative for chest pain or palpitations  RESPIRATORY:  Negative for cough, wheezing, or SOB   GASTROINTESTINAL:  Positive for  nausea, hear burn  and vomiting, NO diarrhea, constipation, or abdominal pain  GENITOURINARY:  Negative frequency, urgency , dysuria or hematuria   NEUROLOGIC:  No headache, confusion, dizziness, lightheadedness  All other systems were reviewed and are negative          I&O's Detail      Physical Exam:  Vital Signs Last 24 Hrs  T(C): 36.9 (07 Apr 2019 08:54), Max: 38.5 (07 Apr 2019 00:52)  T(F): 98.5 (07 Apr 2019 08:54), Max: 101.3 (07 Apr 2019 00:52)  HR: 91 (07 Apr 2019 08:54) (76 - 93)  BP: 109/65 (07 Apr 2019 08:54) (100/55 - 117/81)  BP(mean): --  RR: 24 (07 Apr 2019 08:54) (16 - 30)  SpO2: 91% (07 Apr 2019 08:54) (91% - 99%)  Height (cm): 182.88 (04-06 @ 17:38)  Weight (kg): 79.4 (04-06 @ 17:38)  BMI (kg/m2): 23.7 (04-06 @ 17:38)  BSA (m2): 2.01 (04-06 @ 17:38)    General: well developed well nourished, in no acute distress  Eyes: sclera anicteric, pupils equal and reactive to light  ENMT: buccal mucosa dry , pharynx not injected  Neck: supple, trachea midline  Lungs: coarse breath sounds right base , no wheeze/rhonchi  Cardiovascular: regular rate and rhythm, S1 S2  Abdomen: soft, nontender, no organomegaly present, bowel sounds normal  Neurological:  alert and oriented x3, Cranial Nerves II-XII grossly intact  Skin: no increased ecchymosis/petechiae/purpura  Lymph Nodes: no palpable cervical/supraclavicular lymph nodes enlargements  Extremities: no cyanosis/clubbing/edema    Labs:                             10.6   137.84  )----------(  81        ( 07 Apr 2019 05:50 )               34.5      140    |  105    |  16     ----------------------------<  90         ( 07 Apr 2019 05:50 )  4.1     |  26     |  1.20     Ca    7.6        ( 07 Apr 2019 05:50 )    TPro  5.6    /  Alb  3.4    /  TBili  1.5    /  DBili  x      /  AST  15     /  ALT  32     /  AlkPhos  71     ( 07 Apr 2019 05:50 )    LIVER FUNCTIONS - ( 07 Apr 2019 05:50 )  Alb: 3.4 g/dL / Pro: 5.6 g/dL / ALK PHOS: 71 U/L / ALT: 32 U/L / AST: 15 U/L / GGT: x           Lactate, Blood: 0.7 mmol/L (04-06-19 @ 18:54)    CARDIAC MARKERS ( 07 Apr 2019 05:50 )  <.015 ng/mL / x     / x     / x     / x      CARDIAC MARKERS ( 06 Apr 2019 18:54 )  <.015 ng/mL / x     / 57 U/L / x     / <1.0 ng/mL      RECENT CULTURES:    Culture - Blood (collected 04-07-19 @ 00:43)  Source: .Blood Blood  Gram Stain (04-07-19 @ 11:37):    Growth in aerobic bottle: Gram Positive Cocci in Pairs and Chains  Preliminary Report (04-07-19 @ 11:37):    Growth in aerobic bottle: Gram Positive Cocci in Pairs and Chains    "Due to technical problems, Proteus sp. will Not be reported as part of    the BCID panel until further notice"    ***Blood Panel PCR results on this specimen are available    approximately 3 hours after the Gram stain result.***    Gram stain, PCR, and/or culture results may not always    correspond due to difference in methodologies.    ************************************************************    This PCR assay was performed using [employer].    The following targets are tested for: Enterococcus,    vancomycin resistant enterococci, Listeria monocytogenes,    coagulase negative staphylococci, S. aureus,    methicillin resistant S. aureus, Streptococcus agalactiae    (Group B), S. pneumoniae, S. pyogenes (Group A),    Acinetobacter baumannii, Enterobacter cloacae, E. coli,    Klebsiella oxytoca, K. pneumoniae, Proteus sp.,    Serratia marcescens, Haemophilus influenzae,    Neisseria meningitidis, Pseudomonas aeruginosa, Candida    albicans, C.glabrata, C krusei, C parapsilosis,    C. tropicalis and the KPC resistance gene.    Culture - Blood (collected 04-07-19 @ 00:43)  Source: .Blood Blood  Gram Stain (04-07-19 @ 11:56):    Growth in aerobic bottle: Gram Positive Cocci in Pairs and Chains  Preliminary Report (04-07-19 @ 11:56):    Growth in aerobic bottle: Gram Positive Cocci in Pairs and Chains    FLU A B RSV Detection by PCR (04.06.19 @ 19:46)    Flu A Result: NotDetec:     Flu B Result: NotDetec    RSV Result: NotDetec        RADIOLOGY & ADDITIONAL STUDIES:  Xray Chest 1 View- PORTABLE-Urgent (04.06.19 @ 19:09) >    INTERPRETATION:  History: Chills    Portable chest radiograph is performed. There is no study for comparison.    The heart is not enlarged. The trachea is midline. There is suspicion of   infiltrate at the medial right lung base which could be confirmed with PA   and lateral radiographs.    Impression: Suspicion of infiltrate at the medial right lung base.   Recommend PA and lateral films.      Assessment :   47 yo Male PMH CLL x 10 years  not on any medications,  HTN, HLD, and depression presenting with 2 day history of fevers, chills, generalized malaise and nausea, 1 episode vomiting, and chest discomfort. He has gram positive bacteremia likely strep species , with CXR suspicious for RML pneumonia this could be pneumococcal pneumonia with bacteremia and sepsis     He is immunocompromised and may have hypogammaglobinemia     The etiology of nausea and heartburn are unclear may have stress gastritis     Plan :   - will repeat blood cs x 2 , continue with IV [id number] q12 . will restart IV Rocephin 1 gram daily   - repeat CXR PA and lateral in am  - check sed rate, CRP, immunoglobulin levels strep pneumonococcal antigen   - needs IV hydration   - add Protonix for gastritis , give one dose Zofran for nausea   - trend cbc     Continue with present regime .  Appropriate use of antibiotics and adverse effects reviewed.      I have discussed the above plan of care with patient and his wife present at bedside in detail. They expressed understanding of the treatment plan . Risks, benefits and alternatives discussed in detail. I have asked if they have any questions or concerns and appropriately addressed them to the best of my ability.       > 75 minutes spent in direct patient care reviewing  the notes, lab data/ imaging , discussion with multidisciplinary team. All questions were addressed and answered to the best of my capacity .    Thank you for allowing me to participate in the care of your patient .      Praveen Hall MD  [phone]

## 2019-04-08 NOTE — PROVIDER CONTACT NOTE (OTHER) - SITUATION
Pt O2 sat 88% on RA
Patient noted to have blood tinged phlegm on coughing .Per pt has it since admission.
pt has a fever of 100.8 orally

## 2019-04-08 NOTE — PROGRESS NOTE ADULT - SUBJECTIVE AND OBJECTIVE BOX
CHIEF COMPLAINT/INTERVAL HISTORY:  Pt. seen and evaluated for strep pneumoniae bacteremia and pneumonia.  Pt. reports feeling better.  Tolerating IV antibiotics.  Less pain with deep inspiration.  Respiratory status improving.     REVIEW OF SYSTEMS:  No fever, acute respiratory distress, or abdominal pain.     Vital Signs Last 24 Hrs  T(C): 36.8 (2019 05:38), Max: 37.1 (2019 15:44)  T(F): 98.2 (2019 05:38), Max: 98.7 (2019 15:44)  HR: 90 (2019 05:38) (82 - 94)  BP: 129/72 (2019 05:38) (106/71 - 135/60)  BP(mean): --  RR: 19 (2019 05:38) (19 - 22)  SpO2: 90% (2019 05:38) (88% - 95%)    PHYSICAL EXAM:  GENERAL: NAD  HEENT: EOMI, hearing normal, conjunctiva and sclera clear  Chest: Dimished BS at bases, no wheezing  CV: S1S2, RRR,   GI: soft, +BS, NT/ND  Musculoskeletal: no edema  Psychiatric: affect nL, mood nL  Skin: warm and dry    LABS:                        10.7   x     )-----------( 87       ( 2019 09:30 )             34.6         140  |  105  |  16  ----------------------------<  90  4.1   |  26  |  1.20    Ca    7.6<L>      2019 05:50    TPro  5.6<L>  /  Alb  3.4  /  TBili  1.5<H>  /  DBili  x   /  AST  15  /  ALT  32  /  AlkPhos  71  04-07      Urinalysis Basic - ( 2019 13:05 )    Color: Pale Yellow / Appearance: Clear / S.005 / pH: x  Gluc: x / Ketone: Negative  / Bili: Negative / Urobili: Negative   Blood: x / Protein: Negative / Nitrite: Negative   Leuk Esterase: Negative / RBC: x / WBC x   Sq Epi: x / Non Sq Epi: x / Bacteria: x      Assessment and Plan:  -Strep bacteremia and pneumonia:  continue Ceftriaxone 1gm IV daily and Vancomycin 1250mg IV Q12h.  Check repeat blood cx.  ID f/u  -Chest discomfort:  pleuritic in nature likely 2/2 pneumonia.  Pt. reports improving.  Ibuprofen PRN and Tylenol PRN  -Rhinovirus infection:  supportive care  -CLL:  CT A/P  Hepatosplenomegaly. Extensive mesenteric lymphadenopathy. Retroperitoneal para-aortic, pelvic and bilateral iliac lymphadenopathy noted. 1.2 cm hypodense focus identified in the region of the pancreas head. Further evaluation with contrast-enhanced CT evaluation of the abdomen and pelvis (both oral and IV contrast) is recommended.  Consolidative changes identified within the visualized portions of the right middle lobe, right lower lobe and left lower lobe lung parenchyma.  Expansion of the right iliac bone is identified within underlying permeative lesion suggested without evidence for cortical interruption. An indeterminate sclerotic focus is identified within the left iliac bone. Further evaluation with MRI of the osseous pelvis recommended.  Will request heme/onc consult.   -HTN:  continue Lisinopril 10mg PO daily  -HLD:  continue Lipitor 20mg PO QHS  -Depression:  continue Zoloft 200mg PO daily  -VTE ppx:  Heparin 5000 units SQ Q12h

## 2019-04-08 NOTE — CONSULT NOTE ADULT - ASSESSMENT
49 yo male initially diagnosed with CLL in 2006 with CD38+, un-mutated, never requiring treatment and with well preserved Hg/platelet count followed by Dr. Chace Meier and Dr. Saima Soares at [facility], presented with fever, malaise, chest pain, found to have multilobar pneumonia and noted to have drop in Hg and platelet count; also incidentally found to have 1.2 cm hypodensity at head of the pancreas    - continue IV antibiotics as per primary team  - will check immunoglobulin levels, if patient with hypogammaglobulinemia, may need prophylactic IVIG in future to prevent future infections  - systemic LAD is to be expected in patient with CLL; no acute intervention needed at this time  - in regards to pancreatic hypodensity, when patient clinically improved from acute infection, would either check MRI with gadolinium vs. dedicated CT Abdomen with IV contrast vs. EUS; this can be done as outpatient  - discussed case with patient, his wife, his mother-in law via telephone, Dr. Chace Meier via telephone, Dr. Saima Soares via telephone and Dr. Ricardo Quintanilla

## 2019-04-08 NOTE — PROGRESS NOTE ADULT - SUBJECTIVE AND OBJECTIVE BOX
ID progress note   Name: MINA PERERA  Age: 48y  Gender: Male  MRN: [id number]    Interval History-- Events noted, feels better , less right sided pleuritic chest pain . No nausea or vomiting . had ct scan of abd and pelvis yesterday . Low grade fevers Seen by hematology    Notes reviewed    Past Medical History--  Hyperlipidemia  Hypertension  Acute depression  CLL (chronic lymphocytic leukemia)  S/P hernia surgery      For details regarding the patient's social history, family history, and other miscellaneous elements, please refer the initial infectious diseases consultation and/or the admitting history and physical examination for this admission.    Allergies--  Allergies    No Known Allergies    Intolerances        Medications--  Antibiotics:  cefTRIAXone   IVPB 1 Gram(s) IV Intermittent every 24 hours  vancomycin  IVPB 1250 milliGRAM(s) IV Intermittent every 12 hours    Immunologic:  influenza   Vaccine 0.5 milliLiter(s) IntraMuscular once    Other:  acetaminophen   Tablet .. PRN  atorvastatin  heparin  Injectable  ibuprofen  Tablet. PRN  lisinopril  ondansetron Injectable PRN  pantoprazole    Tablet  sertraline  sodium chloride 0.9%.      Review of Systems--  A 10-point review of systems was obtained.     Pertinent positives and negatives--  Constitutional: Pos for  fevers. No Chills. No Rigors.   Cardiovascular: No chest pain. No palpitations.  Respiratory: Pos for cough . No SOB  Gastrointestinal: No nausea or vomiting. No diarrhea or constipation.   Psychiatric: Pleasant. Appropriate affect.    Review of systems otherwise negative except as previously noted.    Physical Examination--  Vital Signs: T(F): 98.2 (19 @ 05:38), Max: 98.7 (19 @ 15:44)  HR: 90 (19 @ 05:38)  BP: 129/72 (19 @ 05:38)  RR: 19 (19 @ 05:38)  SpO2: 90% (19 @ 05:38)  Wt(kg): --  General: Nontoxic-appearing Male in no acute distress.  HEENT: AT/NC. PERRL. EOMI. Anicteric. Conjunctiva pink and moist. Oropharynx clear. Dentition fair.  Neck: Not rigid. No sense of mass.  Nodes: None palpable.  Lungs: Crackles right base without rales, wheezing or rhonchi  Heart: Regular rate and rhythm. No Murmur. No rub. No gallop. No palpable thrill.  Abdomen: Bowel sounds present and normoactive. Soft. Nondistended. Nontender. No sense of mass. Hepatosplenomegaly .  Back: No spinal tenderness. No costovertebral angle tenderness.   Extremities: No cyanosis or clubbing. No edema.   Skin: Warm. Dry. Good turgor. No rash. No vasculitic stigmata.  Psychiatric: Appropriate affect and mood for situation.         Laboratory Studies--  CBC                        10.7   118.92 )-----------( 87       ( 2019 09:30 )             34.6       Chemistries      142  |  109<H>  |  15  ----------------------------<  91  4.0   |  26  |  0.95    Ca    8.1<L>      2019 09:30    TPro  5.6<L>  /  Alb  3.4  /  TBili  1.5<H>  /  DBili  x   /  AST  15  /  ALT  32  /  AlkPhos  71      Immunoglobulins Panel (19 @ 18:46)    Quantitative Ig mg/dL    Quantitative IgA: 8 mg/dL    Quantitative IgM: <10 mg/dL    PAUL Kappa: 4.98 mg/dL    PAUL Lambda: 0.31 mg/dL    [place]/Lambda Free Light Chain Ratio, Serum: 16.06 Ratio    Sedimentation Rate, Erythrocyte (19 @ 14:10)    Sedimentation Rate, Erythrocyte: 25 mm/hr    C-Reactive Protein, Serum (19 @ 16:33)    C-Reactive Protein, Serum: 26.49 mg/dL    Lactate, Blood: 0.9 mmol/L (19 @ 16:14)  Lactate, Blood: 0.7 mmol/L (19 @ 18:54)      RECENT CULTURES    Culture - Urine (collected 2019 18:51)  Source: .Urine Clean Catch (Midstream)  Final Report (2019 12:01):    No growth    Culture - Blood (collected 2019 00:43)  Source: .Blood Blood  Gram Stain (2019 15:25):    Growth in aerobic bottle: Gram Positive Cocci in Pairs and Chains    Growth in anaerobic bottle: Gram Positive Cocci in Pairs and Chains  Preliminary Report (2019 13:44):    Growth in aerobic and anaerobic bottles: Streptococcus pneumoniae    Susceptibility to follow.    "Due to technical problems, Proteus sp. will Not be reported as part of    the BCID panel until further notice"    ***Blood Panel PCR results on this specimenare available    approximately 3 hours after the Gram stain result.***    Gram stain, PCR, and/or culture results may not always    correspond due to difference in methodologies.    ************************************************************    This PCR assaywas performed using [employer].    The following targets are tested for: Enterococcus,    vancomycin resistant enterococci, Listeria monocytogenes,    coagulase negative staphylococci, S. aureus,    methicillin resistant S. aureus, Streptococcus agalactiae    (Group B), S. pneumoniae, S. pyogenes (Group A),    Acinetobacter baumannii, Enterobacter cloacae, E. coli,    Klebsiella oxytoca, K. pneumoniae, Proteus sp.,    Serratia marcescens, Haemophilus influenzae,    Neisseria meningitidis, Pseudomonas aeruginosa, Candida    albicans, C. glabrata, C krusei, C parapsilosis,    C. tropicalis and the KPC resistance gene.  Organism: Blood Culture PCR (2019 13:05)  Organism: Blood Culture PCR (2019 13:05)    Culture - Blood (collected 2019 00:43)  Source: .Blood Blood  Gram Stain (2019 15:24):    Growth in aerobic bottle: Gram Positive Cocci in Pairs and Chains    Growth in anaerobic bottle: Gram Positive Cocci in Pairs and Chains  Preliminary Report (2019 13:45):    Growth in aerobic and anaerobic bottles: Streptococcus pneumoniae    See previous culture [id number]        RADIOLOGY:  Xray Chest 2 Views PA/Lat (19 @ 10:24) >  Comparison: 2019.    The heart size and mediastinum is within normal limits.      There is right middle lobe and left lower lobe airspace consolidation,   likely reflecting multifocal pneumonia in the appropriate clinical   setting.  There are probable small posterior pleural effusions bilaterally.      Impression:    Findings as discussed above.      CT Abdomen and Pelvis No Cont (19 @ 16:33) >    IMPRESSION:    1. Hepatosplenomegaly. Extensive mesenteric lymphadenopathy.   Retroperitoneal para-aortic, pelvic and bilateral iliac lymphadenopathy   noted. 1.2 cm hypodense focus identified in the region of the pancreas   head. Further evaluation with contrast-enhanced CT evaluation of the   abdomen and pelvis (both oral and IV contrast) is recommended.  2.Consolidative changes identified within the visualized portions of the   right middle lobe, right lower lobe and left lower lobe lung parenchyma.  3. Expansion of the right iliac bone is identified within underlying   permeative lesion suggested without evidence for cortical interruption.   An indeterminate sclerotic focus is identified within the left iliac   bone. Further evaluation with MRI of the osseous pelvis recommended.        Assessment--  47 yo Male PMH CLL x 10 years  not on any medications,  HTN, HLD, and depression presenting with 2 day history of fevers, chills, generalized malaise and nausea, 1 episode vomiting, and chest discomfort. He has gram positive bacteremia likely strep species , with CXR suspicious for RML pneumonia this could be pneumococcal pneumonia with bacteremia and sepsis . Blood cs confirm Strep pneumoniae .     He is immunocompromised and has  hypogammaglobinemia based on Immunoglobulin panel.     The etiology of nausea and heartburn are unclear may have stress gastritis and fecal impaction     He will need Pneumovax on dc  and he is advised to take yearly flu vaccine     Suggestions--  - will change to Levaquin 750 mg daily , dc Vancomycin after today's dose   - will need 10 days of abx provided repeat blood cs negative   - discussed with Dr. Franco about IVIG infusion   - trend cbc    I have discussed the above plan of care with patient and his wife in detail. They expressed understanding of the treatment plan . Risks, benefits and alternatives discussed in detail. I have asked if they have any questions or concerns and appropriately addressed them to the best of my ability .       > 35 minutes spent in direct patient care reviewing  the notes, lab data/ imaging , discussion with multidisciplinary team. All questions were addressed and answered to the best of my capacity .    Thank you for allowing me to participate in the care of your patient .        Praveen Hall MD  [phone] ID progress note   Name: MINA PERERA  Age: 48y  Gender: Male  MRN: [id number]    Interval History-- Events noted, feels better , less right sided pleuritic chest pain . No nausea or vomiting . had ct scan of abd and pelvis yesterday . Low grade fevers Seen by hematology    Notes reviewed    Past Medical History--  Hyperlipidemia  Hypertension  Acute depression  CLL (chronic lymphocytic leukemia)  S/P hernia surgery      For details regarding the patient's social history, family history, and other miscellaneous elements, please refer the initial infectious diseases consultation and/or the admitting history and physical examination for this admission.    Allergies--  Allergies    No Known Allergies    Intolerances        Medications--  Antibiotics:  cefTRIAXone   IVPB 1 Gram(s) IV Intermittent every 24 hours  vancomycin  IVPB 1250 milliGRAM(s) IV Intermittent every 12 hours    Immunologic:  influenza   Vaccine 0.5 milliLiter(s) IntraMuscular once    Other:  acetaminophen   Tablet .. PRN  atorvastatin  heparin  Injectable  ibuprofen  Tablet. PRN  lisinopril  ondansetron Injectable PRN  pantoprazole    Tablet  sertraline  sodium chloride 0.9%.      Review of Systems--  A 10-point review of systems was obtained.     Pertinent positives and negatives--  Constitutional: Pos for  fevers. No Chills. No Rigors.   Cardiovascular: No chest pain. No palpitations.  Respiratory: Pos for cough . No SOB  Gastrointestinal: No nausea or vomiting. No diarrhea or constipation.   Psychiatric: Pleasant. Appropriate affect.    Review of systems otherwise negative except as previously noted.    Physical Examination--  Vital Signs: T(F): 98.2 (19 @ 05:38), Max: 98.7 (19 @ 15:44)  HR: 90 (19 @ 05:38)  BP: 129/72 (19 @ 05:38)  RR: 19 (19 @ 05:38)  SpO2: 90% (19 @ 05:38)  Wt(kg): --  General: Nontoxic-appearing Male in no acute distress.  HEENT: AT/NC. PERRL. EOMI. Anicteric. Conjunctiva pink and moist. Oropharynx clear. Dentition fair.  Neck: Not rigid. No sense of mass.  Nodes: None palpable.  Lungs: Crackles right base without rales, wheezing or rhonchi  Heart: Regular rate and rhythm. No Murmur. No rub. No gallop. No palpable thrill.  Abdomen: Bowel sounds present and normoactive. Soft. Nondistended. Nontender. No sense of mass. Hepatosplenomegaly .  Back: No spinal tenderness. No costovertebral angle tenderness.   Extremities: No cyanosis or clubbing. No edema.   Skin: Warm. Dry. Good turgor. No rash. No vasculitic stigmata.  Psychiatric: Appropriate affect and mood for situation.         Laboratory Studies--  CBC                        10.7   118.92 )-----------( 87       ( 2019 09:30 )             34.6       Chemistries      142  |  109<H>  |  15  ----------------------------<  91  4.0   |  26  |  0.95    Ca    8.1<L>      2019 09:30    TPro  5.6<L>  /  Alb  3.4  /  TBili  1.5<H>  /  DBili  x   /  AST  15  /  ALT  32  /  AlkPhos  71      Immunoglobulins Panel (19 @ 18:46)    Quantitative Ig mg/dL    Quantitative IgA: 8 mg/dL    Quantitative IgM: <10 mg/dL    PAUL Kappa: 4.98 mg/dL    PAUL Lambda: 0.31 mg/dL    [place]/Lambda Free Light Chain Ratio, Serum: 16.06 Ratio    Sedimentation Rate, Erythrocyte (19 @ 14:10)    Sedimentation Rate, Erythrocyte: 25 mm/hr    C-Reactive Protein, Serum (19 @ 16:33)    C-Reactive Protein, Serum: 26.49 mg/dL    Lactate, Blood: 0.9 mmol/L (19 @ 16:14)  Lactate, Blood: 0.7 mmol/L (19 @ 18:54)        RECENT CULTURES    Culture - Urine (collected 2019 18:51)  Source: .Urine Clean Catch (Midstream)  Final Report (2019 12:01):    No growth    Culture - Blood (collected 2019 00:43)  Source: .Blood Blood  Gram Stain (2019 15:25):    Growth in aerobic bottle: Gram Positive Cocci in Pairs and Chains    Growth in anaerobic bottle: Gram Positive Cocci in Pairs and Chains  Preliminary Report (2019 13:44):    Growth in aerobic and anaerobic bottles: Streptococcus pneumoniae    Susceptibility to follow.    "Due to technical problems, Proteus sp. will Not be reported as part of    the BCID panel until further notice"    ***Blood Panel PCR results on this specimenare available    approximately 3 hours after the Gram stain result.***    Gram stain, PCR, and/or culture results may not always    correspond due to difference in methodologies.    ************************************************************    This PCR assaywas performed using [employer].    The following targets are tested for: Enterococcus,    vancomycin resistant enterococci, Listeria monocytogenes,    coagulase negative staphylococci, S. aureus,    methicillin resistant S. aureus, Streptococcus agalactiae    (Group B), S. pneumoniae, S. pyogenes (Group A),    Acinetobacter baumannii, Enterobacter cloacae, E. coli,    Klebsiella oxytoca, K. pneumoniae, Proteus sp.,    Serratia marcescens, Haemophilus influenzae,    Neisseria meningitidis, Pseudomonas aeruginosa, Candida    albicans, C. glabrata, C krusei, C parapsilosis,    C. tropicalis and the KPC resistance gene.  Organism: Blood Culture PCR (2019 13:05)  Organism: Blood Culture PCR (2019 13:05)    Culture - Blood (collected 2019 00:43)  Source: .Blood Blood  Gram Stain (2019 15:24):    Growth in aerobic bottle: Gram Positive Cocci in Pairs and Chains    Growth in anaerobic bottle: Gram Positive Cocci in Pairs and Chains  Preliminary Report (2019 13:45):    Growth in aerobic and anaerobic bottles: Streptococcus pneumoniae    See previous culture [id number]        RADIOLOGY:  Xray Chest 2 Views PA/Lat (19 @ 10:24) >  Comparison: 2019.    The heart size and mediastinum is within normal limits.      There is right middle lobe and left lower lobe airspace consolidation,   likely reflecting multifocal pneumonia in the appropriate clinical   setting.  There are probable small posterior pleural effusions bilaterally.      Impression:    Findings as discussed above.      CT Abdomen and Pelvis No Cont (19 @ 16:33) >    IMPRESSION:    1. Hepatosplenomegaly. Extensive mesenteric lymphadenopathy.   Retroperitoneal para-aortic, pelvic and bilateral iliac lymphadenopathy   noted. 1.2 cm hypodense focus identified in the region of the pancreas   head. Further evaluation with contrast-enhanced CT evaluation of the   abdomen and pelvis (both oral and IV contrast) is recommended.  2.Consolidative changes identified within the visualized portions of the   right middle lobe, right lower lobe and left lower lobe lung parenchyma.  3. Expansion of the right iliac bone is identified within underlying   permeative lesion suggested without evidence for cortical interruption.   An indeterminate sclerotic focus is identified within the left iliac   bone. Further evaluation with MRI of the osseous pelvis recommended.        Assessment--  47 yo Male PMH CLL x 10 years  not on any medications,  HTN, HLD, and depression presenting with 2 day history of fevers, chills, generalized malaise and nausea, 1 episode vomiting, and chest discomfort. He has gram positive bacteremia likely strep species , with CXR suspicious for RML pneumonia this could be pneumococcal pneumonia with bacteremia and sepsis . Blood cs confirm Strep pneumoniae .  Ct andsomen shows RML> RLL and LLL consoldation so has multilobar pneumococcal pneumonia     He is immunocompromised and has  hypogammaglobinemia based on Immunoglobulin panel.     The etiology of nausea and heartburn are unclear may have stress gastritis and fecal impaction     He will need Pneumovax on dc  and he is advised to take yearly flu vaccine     Suggestions--  - will change to Levaquin 750 mg daily , dc Vancomycin after today's dose   - will need 10 days of abx provided repeat blood cs negative   - discussed with Dr. Franco about IVIG infusion   - trend cbc    I have discussed the above plan of care with patient and his wife in detail. They expressed understanding of the treatment plan . Risks, benefits and alternatives discussed in detail. I have asked if they have any questions or concerns and appropriately addressed them to the best of my ability .  Answered their questions to their satisfaction, discussed complications of sever multifocal pneumonia       > 45 minutes spent in direct patient care reviewing  the notes, lab data/ imaging , discussion with multidisciplinary team. All questions were addressed and answered to the best of my capacity .    Thank you for allowing me to participate in the care of your patient .        Praveen Hall MD  [phone]

## 2019-04-09 ENCOUNTER — APPOINTMENT (OUTPATIENT)
Dept: HEMATOLOGY ONCOLOGY | Facility: CLINIC | Age: 49
End: 2019-04-09

## 2019-04-09 LAB
-  CEFTRIAXONE: SIGNIFICANT CHANGE UP
-  ERYTHROMYCIN: SIGNIFICANT CHANGE UP
-  LEVOFLOXACIN: SIGNIFICANT CHANGE UP
-  PENICILLIN: SIGNIFICANT CHANGE UP
-  TRIMETHOPRIM/SULFAMETHOXAZOLE: SIGNIFICANT CHANGE UP
-  VANCOMYCIN: SIGNIFICANT CHANGE UP
ANION GAP SERPL CALC-SCNC: 8 MMOL/L — SIGNIFICANT CHANGE UP (ref 5–17)
BASOPHILS # BLD AUTO: 0 K/UL — SIGNIFICANT CHANGE UP (ref 0–0.2)
BASOPHILS NFR BLD AUTO: 0 % — SIGNIFICANT CHANGE UP (ref 0–2)
BUN SERPL-MCNC: 13 MG/DL — SIGNIFICANT CHANGE UP (ref 7–23)
CALCIUM SERPL-MCNC: 9.2 MG/DL — SIGNIFICANT CHANGE UP (ref 8.5–10.1)
CHLORIDE SERPL-SCNC: 106 MMOL/L — SIGNIFICANT CHANGE UP (ref 96–108)
CO2 SERPL-SCNC: 28 MMOL/L — SIGNIFICANT CHANGE UP (ref 22–31)
CREAT SERPL-MCNC: 0.91 MG/DL — SIGNIFICANT CHANGE UP (ref 0.5–1.3)
CULTURE RESULTS: SIGNIFICANT CHANGE UP
CULTURE RESULTS: SIGNIFICANT CHANGE UP
EOSINOPHIL # BLD AUTO: 0 K/UL — SIGNIFICANT CHANGE UP (ref 0–0.5)
EOSINOPHIL NFR BLD AUTO: 0 % — SIGNIFICANT CHANGE UP (ref 0–6)
GLUCOSE SERPL-MCNC: 95 MG/DL — SIGNIFICANT CHANGE UP (ref 70–99)
HCT VFR BLD CALC: 38.5 % — LOW (ref 39–50)
HGB BLD-MCNC: 11.8 G/DL — LOW (ref 13–17)
LYMPHOCYTES # BLD AUTO: 118.8 K/UL — HIGH (ref 1–3.3)
LYMPHOCYTES # BLD AUTO: 85 % — HIGH (ref 13–44)
LYMPHOCYTES # SPEC AUTO: 1 % — HIGH (ref 0–0)
MAGNESIUM SERPL-MCNC: 1.8 MG/DL — SIGNIFICANT CHANGE UP (ref 1.6–2.6)
MANUAL SMEAR VERIFICATION: SIGNIFICANT CHANGE UP
MCHC RBC-ENTMCNC: 26.8 PG — LOW (ref 27–34)
MCHC RBC-ENTMCNC: 30.6 GM/DL — LOW (ref 32–36)
MCV RBC AUTO: 87.5 FL — SIGNIFICANT CHANGE UP (ref 80–100)
METHOD TYPE: SIGNIFICANT CHANGE UP
METHOD TYPE: SIGNIFICANT CHANGE UP
MONOCYTES # BLD AUTO: 1.4 K/UL — HIGH (ref 0–0.9)
MONOCYTES NFR BLD AUTO: 1 % — LOW (ref 2–14)
NEUTROPHILS # BLD AUTO: 16.77 K/UL — HIGH (ref 1.8–7.4)
NEUTROPHILS NFR BLD AUTO: 11 % — LOW (ref 43–77)
NEUTS BAND # BLD: 1 % — SIGNIFICANT CHANGE UP (ref 0–8)
NRBC # BLD: 0 — SIGNIFICANT CHANGE UP
NRBC # BLD: SIGNIFICANT CHANGE UP /100 WBCS (ref 0–0)
ORGANISM # SPEC MICROSCOPIC CNT: SIGNIFICANT CHANGE UP
PLAT MORPH BLD: NORMAL — SIGNIFICANT CHANGE UP
PLATELET # BLD AUTO: 108 K/UL — LOW (ref 150–400)
POTASSIUM SERPL-MCNC: 3.8 MMOL/L — SIGNIFICANT CHANGE UP (ref 3.5–5.3)
POTASSIUM SERPL-SCNC: 3.8 MMOL/L — SIGNIFICANT CHANGE UP (ref 3.5–5.3)
RBC # BLD: 4.4 M/UL — SIGNIFICANT CHANGE UP (ref 4.2–5.8)
RBC # FLD: 14.4 % — SIGNIFICANT CHANGE UP (ref 10.3–14.5)
RBC BLD AUTO: SIGNIFICANT CHANGE UP
SMUDGE CELLS # BLD: PRESENT — SIGNIFICANT CHANGE UP
SODIUM SERPL-SCNC: 142 MMOL/L — SIGNIFICANT CHANGE UP (ref 135–145)
SPECIMEN SOURCE: SIGNIFICANT CHANGE UP
SPECIMEN SOURCE: SIGNIFICANT CHANGE UP
VARIANT LYMPHS # BLD: 1 % — SIGNIFICANT CHANGE UP (ref 0–6)
WBC # BLD: 139.76 K/UL — CRITICAL HIGH (ref 3.8–10.5)
WBC # FLD AUTO: 139.76 K/UL — CRITICAL HIGH (ref 3.8–10.5)

## 2019-04-09 PROCEDURE — 99232 SBSQ HOSP IP/OBS MODERATE 35: CPT

## 2019-04-09 RX ORDER — ALPRAZOLAM 0.25 MG
0.25 TABLET ORAL AT BEDTIME
Qty: 0 | Refills: 0 | Status: DISCONTINUED | OUTPATIENT
Start: 2019-04-09 | End: 2019-04-10

## 2019-04-09 RX ORDER — VANCOMYCIN HCL 1 G
1250 VIAL (EA) INTRAVENOUS ONCE
Qty: 0 | Refills: 0 | Status: COMPLETED | OUTPATIENT
Start: 2019-04-09 | End: 2019-04-09

## 2019-04-09 RX ORDER — LACTOBACILLUS ACIDOPHILUS 100MM CELL
1 CAPSULE ORAL
Qty: 0 | Refills: 0 | Status: DISCONTINUED | OUTPATIENT
Start: 2019-04-09 | End: 2019-04-10

## 2019-04-09 RX ADMIN — LISINOPRIL 10 MILLIGRAM(S): 2.5 TABLET ORAL at 06:58

## 2019-04-09 RX ADMIN — SERTRALINE 200 MILLIGRAM(S): 25 TABLET, FILM COATED ORAL at 11:48

## 2019-04-09 RX ADMIN — Medication 1 TABLET(S): at 17:11

## 2019-04-09 RX ADMIN — HEPARIN SODIUM 5000 UNIT(S): 5000 INJECTION INTRAVENOUS; SUBCUTANEOUS at 17:11

## 2019-04-09 RX ADMIN — PANTOPRAZOLE SODIUM 40 MILLIGRAM(S): 20 TABLET, DELAYED RELEASE ORAL at 06:58

## 2019-04-09 RX ADMIN — Medication 166.67 MILLIGRAM(S): at 01:55

## 2019-04-09 RX ADMIN — Medication 0.25 MILLIGRAM(S): at 21:46

## 2019-04-09 RX ADMIN — ATORVASTATIN CALCIUM 20 MILLIGRAM(S): 80 TABLET, FILM COATED ORAL at 21:46

## 2019-04-09 RX ADMIN — HEPARIN SODIUM 5000 UNIT(S): 5000 INJECTION INTRAVENOUS; SUBCUTANEOUS at 06:58

## 2019-04-09 NOTE — PROGRESS NOTE ADULT - SUBJECTIVE AND OBJECTIVE BOX
All interim records and events noted.    feeling much improved  multiple family members present  tolerated IVIG well yesterday  reports may be going home tomorrow      MEDICATIONS  (STANDING):  atorvastatin 20 milliGRAM(s) Oral at bedtime  heparin  Injectable 5000 Unit(s) SubCutaneous every 12 hours  influenza   Vaccine 0.5 milliLiter(s) IntraMuscular once  lactobacillus acidophilus 1 Tablet(s) Oral two times a day with meals  levoFLOXacin IVPB      levoFLOXacin IVPB 750 milliGRAM(s) IV Intermittent every 24 hours  lisinopril 10 milliGRAM(s) Oral daily  pantoprazole    Tablet 40 milliGRAM(s) Oral before breakfast  sertraline 200 milliGRAM(s) Oral daily    MEDICATIONS  (PRN):  acetaminophen   Tablet .. 650 milliGRAM(s) Oral every 6 hours PRN Temp greater or equal to 38C (100.4F)  ibuprofen  Tablet. 600 milliGRAM(s) Oral every 6 hours PRN Mild Pain (1 - 3), Moderate Pain (4 - 6)  ondansetron Injectable 4 milliGRAM(s) IV Push every 4 hours PRN Nausea and/or Vomiting      Vital Signs Last 24 Hrs  T(C): 36.6 (09 Apr 2019 14:01), Max: 36.7 (09 Apr 2019 05:22)  T(F): 97.9 (09 Apr 2019 14:01), Max: 98 (09 Apr 2019 05:22)  HR: 72 (09 Apr 2019 14:01) (66 - 76)  BP: 129/83 (09 Apr 2019 14:01) (127/80 - 143/83)  BP(mean): --  RR: 18 (09 Apr 2019 14:01) (18 - 19)  SpO2: 95% (09 Apr 2019 14:01) (93% - 95%)    PHYSICAL EXAM  General: well developed  well nourished, in no acute distress  Head: atraumatic, normocephalic  ENT: sclera anicteric, buccal mucosa moist  Neck: supple, trachea midline  CV: S1 S2, regular rate and rhythm  Lungs: clear to auscultation, no wheezes/rhonchi  Abdomen: soft, nontender, bowel sounds present, no palpable hepatosplenomegaly  Extrem: no clubbing/cyanosis/edema  Skin: no significant increased ecchymosis/petechiae  Neuro: alert and oriented X3,  no focal deficits      LABS:             11.8   139.76 )-----------( 108      ( 04-09 @ 08:36 )             38.5                10.7   118.92 )-----------( 87       ( 04-08 @ 09:30 )             34.6                10.6   137.84 )-----------( 81       ( 04-07 @ 05:50 )             34.5                11.2   163.97 )-----------( 105      ( 04-06 @ 18:54 )             36.9       04-09    142  |  106  |  13  ----------------------------<  95  3.8   |  28  |  0.91    Ca    9.2      09 Apr 2019 08:36  Mg     1.8     04-09          RADIOLOGY & ADDITIONAL STUDIES:    IMPRESSION/RECOMMENDATIONS:

## 2019-04-09 NOTE — PROGRESS NOTE ADULT - SUBJECTIVE AND OBJECTIVE BOX
ID progress note    Name: MINA PERERA  Age: 48y  Gender: Male  MRN: [id number]    Interval History-- Events noted, feels much better , denies any pleuritic chest pain or cough . Afebrile this am, tmax 100.8 yesterday afternoon . S/p IVIG infusion last night   Notes reviewed    Past Medical History--  Hyperlipidemia  Hypertension  Acute depression  CLL (chronic lymphocytic leukemia)  S/P hernia surgery      For details regarding the patient's social history, family history, and other miscellaneous elements, please refer the initial infectious diseases consultation and/or the admitting history and physical examination for this admission.    Allergies--  Allergies    No Known Allergies    Intolerances        Medications--  Antibiotics:  levoFLOXacin IVPB      levoFLOXacin IVPB 750 milliGRAM(s) IV Intermittent every 24 hours    Immunologic:  influenza   Vaccine 0.5 milliLiter(s) IntraMuscular once    Other:  acetaminophen   Tablet .. PRN  atorvastatin  heparin  Injectable  ibuprofen  Tablet. PRN  lisinopril  ondansetron Injectable PRN  pantoprazole    Tablet  sertraline  sodium chloride 0.9%.      Review of Systems--  A 10-point review of systems was obtained.     Pertinent positives and negatives--  Constitutional: No fevers. No Chills. No Rigors.   Cardiovascular: No chest pain. No palpitations.  Respiratory: No shortness of breath. No cough.  Gastrointestinal: No nausea or vomiting. No diarrhea or constipation.   Psychiatric: Pleasant. Appropriate affect.    Review of systems otherwise negative except as previously noted.    Physical Examination--  Vital Signs: T(F): 98 (19 @ 05:22), Max: 100.8 (19 @ 14:36)  HR: 66 (19 @ 05:22)  BP: 127/80 (19 @ 05:22)  RR: 18 (19 @ 05:22)  SpO2: 93% (19 @ 05:22)  Wt(kg): --  General: Nontoxic-appearing Male in no acute distress.  HEENT: AT/NC. PERRL. EOMI. Anicteric. Conjunctiva pink and moist. Oropharynx clear. Dentition fair.  Neck: Not rigid. No sense of mass.  Nodes: None palpable.  Lungs: Coarse breath sounds right base  without rales, wheezing or rhonchi  Heart: Regular rate and rhythm. No Murmur. No rub. No gallop. No palpable thrill.  Abdomen: Bowel sounds present and normoactive. Soft. Nondistended. Nontender. No sense of mass. No organomegaly.  Back: No spinal tenderness. No costovertebral angle tenderness.   Extremities: No cyanosis or clubbing. No edema.   Skin: Warm. Dry. Good turgor. No rash. No vasculitic stigmata.  Psychiatric: Appropriate affect and mood for situation.         Laboratory Studies--  CBC                        11.8   139.76 )-----------( 108      ( 2019 08:36 )             38.5       Chemistries      142  |  106  |  13  ----------------------------<  95  3.8   |  28  |  0.91    Ca    9.2      2019 08:36  Mg     1.8         Immunoglobulins Panel (19 @ 18:46)    Quantitative Ig mg/dL    Quantitative IgA: 8 mg/dL    Quantitative IgM: <10 mg/dL    PAUL Kappa: 4.98 mg/dL    PAUL Lambda: 0.31 mg/dL    [place]/Lambda Free Light Chain Ratio, Serum: 16.06 Ratio    Sedimentation Rate, Erythrocyte (19 @ 14:10)    Sedimentation Rate, Erythrocyte: 25 mm/hr    C-Reactive Protein, Serum (19 @ 16:33)    C-Reactive Protein, Serum: 26.49 mg/dL    Lactate, Blood: 0.9 mmol/L (19 @ 16:14)  Lactate, Blood: 0.7 mmol/L (19 @ 18:54)    RECENT CULTURES    Culture - Urine (collected 2019 18:51)  Source: .Urine Clean Catch (Midstream)  Final Report (2019 12:01):    No growth    Culture - Blood (collected 2019 18:43)  Source: .Blood Blood  Preliminary Report (2019 19:01):    No growth to date.    Culture - Blood (collected 2019 18:05)  Source: .Blood Blood-Peripheral  Preliminary Report (2019 19:01):    No growth to date.    Culture - Blood (collected 2019 18:05)  Source: .Blood Blood-Peripheral  Preliminary Report (2019 19:01):    No growth to date.    Culture - Blood (19 @ 00:43)    -  Streptococcus pneumoniae: Detec    Gram Stain:   Growth in aerobic bottle: Gram Positive Cocci in Pairs and Chains  Growth in anaerobic bottle: Gram Positive Cocci in Pairs and Chains    -  Ceftriaxone: See note 0.38    -  Erythromycin: R Predicts results for azithromycin.    -  Levofloxacin: S    -  Penicillin: See note 0.19    -  Trimethoprim/Sulfamethoxazole: S    -  Vancomycin: S    Specimen Source: .Blood Blood    Organism: Blood Culture PCR    Organism: Streptococcus pneumoniae    Organism: Streptococcus pneumoniae    Culture Results:   Growth in aerobic and anaerobic bottles: Streptococcus pneumoniae  Therapy requires maximum dose of Ceftriaxone and/or  Penicillin. Interpretive criteria as follows:  Ceftriaxone breakpoints for meningitis infections:  <=0.5=Sensitive, 1.0=Intermediate, >=2.0=Resistant  Penicillin breakpoints for meningitis infections:  <=0.06=Sensitive, >= 0.12=Resistant  Ceftriaxone breakpoints for non-meningitis infections:  <=1.0=Sensitive, 2.0=Intermediate, >=4.0=Resistant  Penicillin breakpoints for non-meningitis infections:  <=2.0=Sensitive, 4.0=Intermediate, >=8.0=Resistant  Oral Penicillin breakpoints:  <=0.06=Sensitive, 0.12-1.0=Intermediate, >=2.0=Resistant  Please note: In case of suspected meningitis, CSF  interpretive criteria must be used independent of specimen source.  "Due to technical problems, Proteus sp. will Not be reported as part of  the BCID panel until further notice"  ***Blood Panel PCR results on this specimen are available  approximately 3 hours after the Gram stain result.***  Gram stain, PCR, and/or culture results may not always  correspond due to difference in methodologies.  ************************************************************  This PCR assay was performed using [employer].  The following targets aretested for: Enterococcus,  vancomycin resistant enterococci, Listeria monocytogenes,  coagulase negative staphylococci, S. aureus,  methicillin resistant S. aureus, Streptococcus agalactiae  (Group B), S. pneumoniae, S. pyogenes (Group A),  Acinetobacter baumannii, Enterobacter cloacae, E. coli,  Klebsiella oxytoca, K. pneumoniae, Proteus sp.,  Serratia marcescens, Haemophilus influenzae,  Neisseria meningitidis, Pseudomonas aeruginosa, Candida  albicans, C. glabrata, C krusei, C parapsilosis,  C. tropicalis and the KPC resistance gene.    Organism Identification: Blood Culture PCR  Streptococcus pneumoniae    Method Type: PCR    Method Type: ETEST    Method Type: KB      Culture - Blood (collected 2019 00:43)  Source: .Blood Blood  Gram Stain (2019 15:24):    Growth in aerobic bottle: Gram Positive Cocci in Pairs and Chains    Growth in anaerobic bottle: Gram Positive Cocci in Pairs and Chains  Final Report (2019 09:05):    Growth in aerobic and anaerobic bottles: Streptococcus pneumoniae    See previous culture [id number]        RADIOLOGY:  Xray Chest 2 Views PA/Lat (19 @ 10:24) >  The heart size and mediastinum is within normal limits.      There is right middle lobe and left lower lobe airspace consolidation,   likely reflecting multifocal pneumonia in the appropriate clinical   setting.  There are probable small posterior pleural effusions bilaterally.      Impression:    Findings as discussed above.    CT Abdomen and Pelvis No Cont (19 @ 16:33) >    IMPRESSION:    1. Hepatosplenomegaly. Extensive mesenteric lymphadenopathy.   Retroperitoneal para-aortic, pelvic and bilateral iliac lymphadenopathy   noted. 1.2 cm hypodense focus identified in the region of the pancreas   head. Further evaluation with contrast-enhanced CT evaluation of the   abdomen and pelvis (both oral and IV contrast) is recommended.  2.Consolidative changes identified within the visualized portions of the   right middle lobe, right lower lobe and left lower lobe lung parenchyma.  3. Expansion of the right iliac bone is identified within underlying   permeative lesion suggested without evidence for cortical interruption.   An indeterminate sclerotic focus is identified within the left iliac   bone. Further evaluation with MRI of the osseous pelvis recommended.        Assessment--  47 yo Male PMH CLL x 10 years  not on any medications,  HTN, HLD, and depression presenting with 2 day history of fevers, chills, generalized malaise and nausea, 1 episode vomiting, and chest discomfort. He has gram positive bacteremia likely strep species , with CXR suspicious for RML pneumonia this could be pneumococcal pneumonia with bacteremia and sepsis . Blood cs confirm Strep pneumoniae .  Ct andsomen shows RML> RLL and LLL consoldation so has multilobar pneumococcal pneumonia     He is immunocompromised and has  hypogammaglobinemia based on Immunoglobulin panel.     The etiology of nausea and heartburn are unclear may have stress gastritis and fecal impaction     He will need Pneumovax on dc  and he is advised to take yearly flu vaccine     Suggestions--  - will continue with IV  Levaquin 750 mg daily , change to po Levaquin on dc home x 10 days   - add bacid 1 tab bid   - will need repeat CXR in 1 week after dc   - patient wishes to speak with his hematologist about pneumococcal vaccine   - trend cbc  - DC IVF   - he may shower   - possible dc tomorrow provided he is afebrile and repeat blood cs still negative     I have discussed the above plan of care with patient and his wife present at bedside  in detail. They expressed understanding of the treatment plan . Risks, benefits and alternatives discussed in detail. I have asked if they have any questions or concerns and appropriately addressed them to the best of my ability .      > 35 minutes spent in direct patient care reviewing  the notes, lab data/ imaging , discussion with multidisciplinary team. All questions were addressed and answered to the best of my capacity .    Thank you for allowing me to participate in the care of your patient .        Praveen Hall MD  [phone] ID progress note    Name: MINA PERERA  Age: 48y  Gender: Male  MRN: [id number]    Interval History-- Events noted, feels much better , denies any pleuritic chest pain or cough . Afebrile this am, tmax 100.8 yesterday afternoon . S/p IVIG infusion last night   Notes reviewed    Past Medical History--  Hyperlipidemia  Hypertension  Acute depression  CLL (chronic lymphocytic leukemia)  S/P hernia surgery      For details regarding the patient's social history, family history, and other miscellaneous elements, please refer the initial infectious diseases consultation and/or the admitting history and physical examination for this admission.    Allergies--  Allergies    No Known Allergies    Intolerances        Medications--  Antibiotics:  levoFLOXacin IVPB      levoFLOXacin IVPB 750 milliGRAM(s) IV Intermittent every 24 hours    Immunologic:  influenza   Vaccine 0.5 milliLiter(s) IntraMuscular once    Other:  acetaminophen   Tablet .. PRN  atorvastatin  heparin  Injectable  ibuprofen  Tablet. PRN  lisinopril  ondansetron Injectable PRN  pantoprazole    Tablet  sertraline  sodium chloride 0.9%.      Review of Systems--  A 10-point review of systems was obtained.     Pertinent positives and negatives--  Constitutional: No fevers. No Chills. No Rigors.   Cardiovascular: No chest pain. No palpitations.  Respiratory: No shortness of breath. No cough.  Gastrointestinal: No nausea or vomiting. No diarrhea or constipation.   Psychiatric: Pleasant. Appropriate affect.    Review of systems otherwise negative except as previously noted.    Physical Examination--  Vital Signs: T(F): 98 (19 @ 05:22), Max: 100.8 (19 @ 14:36)  HR: 66 (19 @ 05:22)  BP: 127/80 (19 @ 05:22)  RR: 18 (19 @ 05:22)  SpO2: 93% (19 @ 05:22)  Wt(kg): --  General: Nontoxic-appearing Male in no acute distress.  HEENT: AT/NC. PERRL. EOMI. Anicteric. Conjunctiva pink and moist. Oropharynx clear. Dentition fair.  Neck: Not rigid. No sense of mass.  Nodes: None palpable.  Lungs: Coarse breath sounds right base  without rales, wheezing or rhonchi  Heart: Regular rate and rhythm. No Murmur. No rub. No gallop. No palpable thrill.  Abdomen: Bowel sounds present and normoactive. Soft. Nondistended. Nontender. No sense of mass. No organomegaly.  Back: No spinal tenderness. No costovertebral angle tenderness.   Extremities: No cyanosis or clubbing. No edema.   Skin: Warm. Dry. Good turgor. No rash. No vasculitic stigmata.  Psychiatric: Appropriate affect and mood for situation.         Laboratory Studies--  CBC                        11.8   139.76 )-----------( 108      ( 2019 08:36 )             38.5       Chemistries      142  |  106  |  13  ----------------------------<  95  3.8   |  28  |  0.91    Ca    9.2      2019 08:36  Mg     1.8         Immunoglobulins Panel (19 @ 18:46)    Quantitative Ig mg/dL    Quantitative IgA: 8 mg/dL    Quantitative IgM: <10 mg/dL    PAUL Kappa: 4.98 mg/dL    PAUL Lambda: 0.31 mg/dL    [place]/Lambda Free Light Chain Ratio, Serum: 16.06 Ratio    Sedimentation Rate, Erythrocyte (19 @ 14:10)    Sedimentation Rate, Erythrocyte: 25 mm/hr    C-Reactive Protein, Serum (19 @ 16:33)    C-Reactive Protein, Serum: 26.49 mg/dL    Lactate, Blood: 0.9 mmol/L (19 @ 16:14)  Lactate, Blood: 0.7 mmol/L (19 @ 18:54)    RECENT CULTURES    Culture - Urine (collected 2019 18:51)  Source: .Urine Clean Catch (Midstream)  Final Report (2019 12:01):    No growth    Culture - Blood (collected 2019 18:43)  Source: .Blood Blood  Preliminary Report (2019 19:01):    No growth to date.    Culture - Blood (collected 2019 18:05)  Source: .Blood Blood-Peripheral  Preliminary Report (2019 19:01):    No growth to date.    Culture - Blood (collected 2019 18:05)  Source: .Blood Blood-Peripheral  Preliminary Report (2019 19:01):    No growth to date.    Culture - Blood (19 @ 00:43)    -  Streptococcus pneumoniae: Detec    Gram Stain:   Growth in aerobic bottle: Gram Positive Cocci in Pairs and Chains  Growth in anaerobic bottle: Gram Positive Cocci in Pairs and Chains    -  Ceftriaxone: See note 0.38    -  Erythromycin: R Predicts results for azithromycin.    -  Levofloxacin: S    -  Penicillin: See note 0.19    -  Trimethoprim/Sulfamethoxazole: S    -  Vancomycin: S    Specimen Source: .Blood Blood    Organism: Blood Culture PCR    Organism: Streptococcus pneumoniae    Organism: Streptococcus pneumoniae    Culture Results:   Growth in aerobic and anaerobic bottles: Streptococcus pneumoniae  Therapy requires maximum dose of Ceftriaxone and/or  Penicillin. Interpretive criteria as follows:  Ceftriaxone breakpoints for meningitis infections:  <=0.5=Sensitive, 1.0=Intermediate, >=2.0=Resistant  Penicillin breakpoints for meningitis infections:  <=0.06=Sensitive, >= 0.12=Resistant  Ceftriaxone breakpoints for non-meningitis infections:  <=1.0=Sensitive, 2.0=Intermediate, >=4.0=Resistant  Penicillin breakpoints for non-meningitis infections:  <=2.0=Sensitive, 4.0=Intermediate, >=8.0=Resistant  Oral Penicillin breakpoints:  <=0.06=Sensitive, 0.12-1.0=Intermediate, >=2.0=Resistant  Please note: In case of suspected meningitis, CSF  interpretive criteria must be used independent of specimen source.  "Due to technical problems, Proteus sp. will Not be reported as part of  the BCID panel until further notice"  ***Blood Panel PCR results on this specimen are available  approximately 3 hours after the Gram stain result.***  Gram stain, PCR, and/or culture results may not always  correspond due to difference in methodologies.  ************************************************************  This PCR assay was performed using [employer].  The following targets aretested for: Enterococcus,  vancomycin resistant enterococci, Listeria monocytogenes,  coagulase negative staphylococci, S. aureus,  methicillin resistant S. aureus, Streptococcus agalactiae  (Group B), S. pneumoniae, S. pyogenes (Group A),  Acinetobacter baumannii, Enterobacter cloacae, E. coli,  Klebsiella oxytoca, K. pneumoniae, Proteus sp.,  Serratia marcescens, Haemophilus influenzae,  Neisseria meningitidis, Pseudomonas aeruginosa, Candida  albicans, C. glabrata, C krusei, C parapsilosis,  C. tropicalis and the KPC resistance gene.    Organism Identification: Blood Culture PCR  Streptococcus pneumoniae    Method Type: PCR    Method Type: ETEST    Method Type: KB      Culture - Blood (collected 2019 00:43)  Source: .Blood Blood  Gram Stain (2019 15:24):    Growth in aerobic bottle: Gram Positive Cocci in Pairs and Chains    Growth in anaerobic bottle: Gram Positive Cocci in Pairs and Chains  Final Report (2019 09:05):    Growth in aerobic and anaerobic bottles: Streptococcus pneumoniae    See previous culture [id number]        RADIOLOGY:  Xray Chest 2 Views PA/Lat (19 @ 10:24) >  The heart size and mediastinum is within normal limits.      There is right middle lobe and left lower lobe airspace consolidation,   likely reflecting multifocal pneumonia in the appropriate clinical   setting.  There are probable small posterior pleural effusions bilaterally.      Impression:    Findings as discussed above.    CT Abdomen and Pelvis No Cont (19 @ 16:33) >    IMPRESSION:    1. Hepatosplenomegaly. Extensive mesenteric lymphadenopathy.   Retroperitoneal para-aortic, pelvic and bilateral iliac lymphadenopathy   noted. 1.2 cm hypodense focus identified in the region of the pancreas   head. Further evaluation with contrast-enhanced CT evaluation of the   abdomen and pelvis (both oral and IV contrast) is recommended.  2.Consolidative changes identified within the visualized portions of the   right middle lobe, right lower lobe and left lower lobe lung parenchyma.  3. Expansion of the right iliac bone is identified within underlying   permeative lesion suggested without evidence for cortical interruption.   An indeterminate sclerotic focus is identified within the left iliac   bone. Further evaluation with MRI of the osseous pelvis recommended.        Assessment--  49 yo Male PMH CLL x 10 years  not on any medications,  HTN, HLD, and depression presenting with 2 day history of fevers, chills, generalized malaise and nausea, 1 episode vomiting, and chest discomfort. He has gram positive bacteremia likely strep species , with CXR suspicious for RML pneumonia this could be pneumococcal pneumonia with bacteremia and sepsis . Blood cs confirm Strep pneumoniae .  Ct andsomen shows RML> RLL and LLL consoldation so has multilobar pneumococcal pneumonia     He is immunocompromised and has  hypogammaglobinemia based on Immunoglobulin panel.     The etiology of nausea and heartburn are unclear may have stress gastritis and fecal impaction     He will need Pneumovax on dc  and he is advised to take yearly flu vaccine     Suggestions--  - will continue with IV  Levaquin 750 mg daily , change to po Levaquin on dc home x 10 days   - add bacid 1 tab bid   - will need repeat CXR in 1 week after dc   - patient wishes to speak with his hematologist about pneumococcal vaccine   - trend cbc  - DC IVF   - he may shower   - possible dc tomorrow provided he is afebrile and repeat blood cs still negative     Continue with present regime .  Appropriate use of antibiotics and adverse effects reviewed.    I have discussed the above plan of care with patient and his wife present at bedside  in detail. They expressed understanding of the treatment plan . Risks, benefits and alternatives discussed in detail. I have asked if they have any questions or concerns and appropriately addressed them to the best of my ability .      > 35 minutes spent in direct patient care reviewing  the notes, lab data/ imaging , discussion with multidisciplinary team. All questions were addressed and answered to the best of my capacity .    Thank you for allowing me to participate in the care of your patient .        Praveen Hall MD  [phone]

## 2019-04-09 NOTE — PROGRESS NOTE ADULT - SUBJECTIVE AND OBJECTIVE BOX
CHIEF COMPLAINT/INTERVAL HISTORY:  Pt. seen and evaluated for strep bacteremia and pneumonia.  Pt. is feeling better.  Reports pleuritic chest pain has resolved.  Tolerating IV antibiotic.   Had fever 100.8 yesterday.      REVIEW OF SYSTEMS:  So far afebrile today.  No SOB, CP, or abdominal pain.      Vital Signs Last 24 Hrs  T(C): 36.7 (2019 05:22), Max: 38.2 (2019 14:36)  T(F): 98 (2019 05:22), Max: 100.8 (2019 14:36)  HR: 66 (2019 05:22) (66 - 82)  BP: 127/80 (2019 05:22) (113/74 - 143/83)  BP(mean): --  RR: 18 (2019 05:22) (18 - 19)  SpO2: 93% (2019 05:22) (93% - 98%)    PHYSICAL EXAM:  GENERAL: NAD  HEENT: EOMI, hearing normal, conjunctiva and sclera clear  Chest: Diminished BS at bases, no wheezing  CV: S1S2, RRR,   GI: soft, +BS, NT/ND  Musculoskeletal: no edema  Psychiatric: affect nL, mood nL  Skin: warm and dry    LABS:                        10.7   118.92 )-----------( 87       ( 2019 09:30 )             34.6     04-08    142  |  109<H>  |  15  ----------------------------<  91  4.0   |  26  |  0.95    Ca    8.1<L>      2019 09:30        Urinalysis Basic - ( 2019 13:05 )    Color: Pale Yellow / Appearance: Clear / S.005 / pH: x  Gluc: x / Ketone: Negative  / Bili: Negative / Urobili: Negative   Blood: x / Protein: Negative / Nitrite: Negative   Leuk Esterase: Negative / RBC: x / WBC x   Sq Epi: x / Non Sq Epi: x / Bacteria: x        Assessment and Plan:  -Strep bacteremia and pneumonia:  Repeat blood cx NGTD.  Continue Levaquin 750mg IV daily.  ID f/u  -Chest discomfort:  pleuritic in nature likely 2/2 pneumonia.  Pt. reports resolution.  Ibuprofen PRN and Tylenol PRN  -Rhinovirus infection:  supportive care  -CLL:  CT A/P  Hepatosplenomegaly. Extensive mesenteric lymphadenopathy. Retroperitoneal para-aortic, pelvic and bilateral iliac lymphadenopathy noted. 1.2 cm hypodense focus identified in the region of the pancreas head. Further evaluation with contrast-enhanced CT evaluation of the abdomen and pelvis (both oral and IV contrast) is recommended.  Consolidative changes identified within the visualized portions of the right middle lobe, right lower lobe and left lower lobe lung parenchyma.  Expansion of the right iliac bone is identified within underlying permeative lesion suggested without evidence for cortical interruption. An indeterminate sclerotic focus is identified within the left iliac bone. Further evaluation with MRI of the osseous pelvis recommended.  Heme/Onc input appreciated.  -Hypogammaglobulinemia:  s/p IVIG.  -HTN:  continue Lisinopril 10mg PO daily  -HLD:  continue Lipitor 20mg PO QHS  -Depression:  continue Zoloft 200mg PO daily  -VTE ppx:  Heparin 5000 units SQ Q12h CHIEF COMPLAINT/INTERVAL HISTORY:  Pt. seen and evaluated for strep bacteremia and pneumonia.  Pt. is feeling better.  Reports pleuritic chest pain has resolved.  Tolerating IV antibiotic.   Had fever 100.8 yesterday.      REVIEW OF SYSTEMS:  So far afebrile today.  No SOB, CP, or abdominal pain.      Vital Signs Last 24 Hrs  T(C): 36.7 (2019 05:22), Max: 38.2 (2019 14:36)  T(F): 98 (2019 05:22), Max: 100.8 (2019 14:36)  HR: 66 (2019 05:22) (66 - 82)  BP: 127/80 (2019 05:22) (113/74 - 143/83)  BP(mean): --  RR: 18 (2019 05:22) (18 - 19)  SpO2: 93% (2019 05:22) (93% - 98%)    PHYSICAL EXAM:  GENERAL: NAD  HEENT: EOMI, hearing normal, conjunctiva and sclera clear  Chest: Diminished BS at bases, no wheezing  CV: S1S2, RRR,   GI: soft, +BS, NT/ND  Musculoskeletal: no edema  Psychiatric: affect nL, mood nL  Skin: warm and dry    LABS:                        10.7   118.92 )-----------( 87       ( 2019 09:30 )             34.6     04-08    142  |  109<H>  |  15  ----------------------------<  91  4.0   |  26  |  0.95    Ca    8.1<L>      2019 09:30        Urinalysis Basic - ( 2019 13:05 )    Color: Pale Yellow / Appearance: Clear / S.005 / pH: x  Gluc: x / Ketone: Negative  / Bili: Negative / Urobili: Negative   Blood: x / Protein: Negative / Nitrite: Negative   Leuk Esterase: Negative / RBC: x / WBC x   Sq Epi: x / Non Sq Epi: x / Bacteria: x        Assessment and Plan:  -Strep bacteremia with sepsis POA and Acute hypoxic respiratory failure 2/2 pneumonia:  Repeat blood cx NGTD.  Continue Levaquin 750mg IV daily.  ID f/u  -Chest discomfort:  pleuritic in nature likely 2/2 pneumonia.  Pt. reports resolution.  Ibuprofen PRN and Tylenol PRN  -Rhinovirus infection:  supportive care  -CLL:  CT A/P  Hepatosplenomegaly. Extensive mesenteric lymphadenopathy. Retroperitoneal para-aortic, pelvic and bilateral iliac lymphadenopathy noted. 1.2 cm hypodense focus identified in the region of the pancreas head. Further evaluation with contrast-enhanced CT evaluation of the abdomen and pelvis (both oral and IV contrast) is recommended.  Consolidative changes identified within the visualized portions of the right middle lobe, right lower lobe and left lower lobe lung parenchyma.  Expansion of the right iliac bone is identified within underlying permeative lesion suggested without evidence for cortical interruption. An indeterminate sclerotic focus is identified within the left iliac bone. Further evaluation with MRI of the osseous pelvis recommended.  Heme/Onc input appreciated.  -Hypogammaglobulinemia:  s/p IVIG.  -HTN:  continue Lisinopril 10mg PO daily  -HLD:  continue Lipitor 20mg PO QHS  -Depression:  continue Zoloft 200mg PO daily  -VTE ppx:  Heparin 5000 units SQ Q12h

## 2019-04-09 NOTE — PROGRESS NOTE ADULT - ASSESSMENT
49 yo male initially diagnosed with CLL in 2006 with CD38+, un-mutated, never requiring treatment and with well preserved Hg/platelet count followed by Dr. Chace Meier and Dr. Saima Soares at [facility], presented with fever, malaise, chest pain, found to have multilobar pneumococcal pneumonia with positive blood culture and noted to have drop in Hg and platelet count; also incidentally found to have 1.2 cm hypodensity at head of the pancreas    -post IVIG did well no adverse side effects  -Hgb and plts increased  -persistent leukocytosis reactive w exacerbation of baseline due to acute infection, no intervention needed  -clinically much improve  -continue present treatment  -pt will f/u w own Heme/Onc Dr Soares and Dr Meier upon discharge    will sign off for now, please call if any questions    -

## 2019-04-10 ENCOUNTER — TRANSCRIPTION ENCOUNTER (OUTPATIENT)
Age: 49
End: 2019-04-10

## 2019-04-10 VITALS
TEMPERATURE: 98 F | DIASTOLIC BLOOD PRESSURE: 82 MMHG | OXYGEN SATURATION: 94 % | SYSTOLIC BLOOD PRESSURE: 120 MMHG | WEIGHT: 186.73 LBS | RESPIRATION RATE: 18 BRPM | HEART RATE: 65 BPM

## 2019-04-10 LAB
ANION GAP SERPL CALC-SCNC: 10 MMOL/L — SIGNIFICANT CHANGE UP (ref 5–17)
BASOPHILS # BLD AUTO: 0 K/UL — SIGNIFICANT CHANGE UP (ref 0–0.2)
BASOPHILS NFR BLD AUTO: 0 % — SIGNIFICANT CHANGE UP (ref 0–2)
BUN SERPL-MCNC: 18 MG/DL — SIGNIFICANT CHANGE UP (ref 7–23)
CALCIUM SERPL-MCNC: 8.6 MG/DL — SIGNIFICANT CHANGE UP (ref 8.5–10.1)
CHLORIDE SERPL-SCNC: 109 MMOL/L — HIGH (ref 96–108)
CO2 SERPL-SCNC: 28 MMOL/L — SIGNIFICANT CHANGE UP (ref 22–31)
CREAT SERPL-MCNC: 1.1 MG/DL — SIGNIFICANT CHANGE UP (ref 0.5–1.3)
EOSINOPHIL # BLD AUTO: 0 K/UL — SIGNIFICANT CHANGE UP (ref 0–0.5)
EOSINOPHIL NFR BLD AUTO: 0 % — SIGNIFICANT CHANGE UP (ref 0–6)
GLUCOSE SERPL-MCNC: 84 MG/DL — SIGNIFICANT CHANGE UP (ref 70–99)
HCT VFR BLD CALC: 36.1 % — LOW (ref 39–50)
HGB BLD-MCNC: 10.9 G/DL — LOW (ref 13–17)
IGA FLD-MCNC: 8 MG/DL — LOW (ref 84–499)
IGG FLD-MCNC: 176 MG/DL — LOW (ref 610–1660)
IGM SERPL-MCNC: <10 MG/DL — LOW (ref 35–242)
LYMPHOCYTES # BLD AUTO: 94 % — HIGH (ref 13–44)
LYMPHOCYTES # BLD AUTO: 97.51 K/UL — HIGH (ref 1–3.3)
MAGNESIUM SERPL-MCNC: 1.8 MG/DL — SIGNIFICANT CHANGE UP (ref 1.6–2.6)
MANUAL SMEAR VERIFICATION: SIGNIFICANT CHANGE UP
MCHC RBC-ENTMCNC: 26.7 PG — LOW (ref 27–34)
MCHC RBC-ENTMCNC: 30.2 GM/DL — LOW (ref 32–36)
MCV RBC AUTO: 88.3 FL — SIGNIFICANT CHANGE UP (ref 80–100)
MONOCYTES # BLD AUTO: 0 K/UL — SIGNIFICANT CHANGE UP (ref 0–0.9)
MONOCYTES NFR BLD AUTO: 0 % — LOW (ref 2–14)
NEUTROPHILS # BLD AUTO: 5.19 K/UL — SIGNIFICANT CHANGE UP (ref 1.8–7.4)
NEUTROPHILS NFR BLD AUTO: 5 % — LOW (ref 43–77)
NRBC # BLD: 0 — SIGNIFICANT CHANGE UP
NRBC # BLD: SIGNIFICANT CHANGE UP /100 WBCS (ref 0–0)
PLAT MORPH BLD: NORMAL — SIGNIFICANT CHANGE UP
PLATELET # BLD AUTO: 89 K/UL — LOW (ref 150–400)
POTASSIUM SERPL-MCNC: 4.1 MMOL/L — SIGNIFICANT CHANGE UP (ref 3.5–5.3)
POTASSIUM SERPL-SCNC: 4.1 MMOL/L — SIGNIFICANT CHANGE UP (ref 3.5–5.3)
RBC # BLD: 4.09 M/UL — LOW (ref 4.2–5.8)
RBC # FLD: 14.4 % — SIGNIFICANT CHANGE UP (ref 10.3–14.5)
RBC BLD AUTO: SIGNIFICANT CHANGE UP
SMUDGE CELLS # BLD: PRESENT — SIGNIFICANT CHANGE UP
SODIUM SERPL-SCNC: 147 MMOL/L — HIGH (ref 135–145)
VARIANT LYMPHS # BLD: 1 % — SIGNIFICANT CHANGE UP (ref 0–6)
WBC # BLD: 103.73 K/UL — CRITICAL HIGH (ref 3.8–10.5)
WBC # FLD AUTO: 103.73 K/UL — CRITICAL HIGH (ref 3.8–10.5)

## 2019-04-10 PROCEDURE — 80053 COMPREHEN METABOLIC PANEL: CPT

## 2019-04-10 PROCEDURE — 71046 X-RAY EXAM CHEST 2 VIEWS: CPT

## 2019-04-10 PROCEDURE — 99239 HOSP IP/OBS DSCHRG MGMT >30: CPT

## 2019-04-10 PROCEDURE — 82784 ASSAY IGA/IGD/IGG/IGM EACH: CPT

## 2019-04-10 PROCEDURE — 74176 CT ABD & PELVIS W/O CONTRAST: CPT

## 2019-04-10 PROCEDURE — 84484 ASSAY OF TROPONIN QUANT: CPT

## 2019-04-10 PROCEDURE — 82550 ASSAY OF CK (CPK): CPT

## 2019-04-10 PROCEDURE — 36415 COLL VENOUS BLD VENIPUNCTURE: CPT

## 2019-04-10 PROCEDURE — 96374 THER/PROPH/DIAG INJ IV PUSH: CPT

## 2019-04-10 PROCEDURE — 87040 BLOOD CULTURE FOR BACTERIA: CPT

## 2019-04-10 PROCEDURE — 87798 DETECT AGENT NOS DNA AMP: CPT

## 2019-04-10 PROCEDURE — 87086 URINE CULTURE/COLONY COUNT: CPT

## 2019-04-10 PROCEDURE — 93005 ELECTROCARDIOGRAM TRACING: CPT

## 2019-04-10 PROCEDURE — 87150 DNA/RNA AMPLIFIED PROBE: CPT

## 2019-04-10 PROCEDURE — 83605 ASSAY OF LACTIC ACID: CPT

## 2019-04-10 PROCEDURE — 80048 BASIC METABOLIC PNL TOTAL CA: CPT

## 2019-04-10 PROCEDURE — 82553 CREATINE MB FRACTION: CPT

## 2019-04-10 PROCEDURE — 87899 AGENT NOS ASSAY W/OPTIC: CPT

## 2019-04-10 PROCEDURE — 71045 X-RAY EXAM CHEST 1 VIEW: CPT

## 2019-04-10 PROCEDURE — 87581 M.PNEUMON DNA AMP PROBE: CPT

## 2019-04-10 PROCEDURE — 87486 CHLMYD PNEUM DNA AMP PROBE: CPT

## 2019-04-10 PROCEDURE — 87633 RESP VIRUS 12-25 TARGETS: CPT

## 2019-04-10 PROCEDURE — 87631 RESP VIRUS 3-5 TARGETS: CPT

## 2019-04-10 PROCEDURE — 81003 URINALYSIS AUTO W/O SCOPE: CPT

## 2019-04-10 PROCEDURE — 87184 SC STD DISK METHOD PER PLATE: CPT

## 2019-04-10 PROCEDURE — 96375 TX/PRO/DX INJ NEW DRUG ADDON: CPT

## 2019-04-10 PROCEDURE — 99285 EMERGENCY DEPT VISIT HI MDM: CPT | Mod: 25

## 2019-04-10 PROCEDURE — 85027 COMPLETE CBC AUTOMATED: CPT

## 2019-04-10 PROCEDURE — 87449 NOS EACH ORGANISM AG IA: CPT

## 2019-04-10 PROCEDURE — 85652 RBC SED RATE AUTOMATED: CPT

## 2019-04-10 PROCEDURE — 83735 ASSAY OF MAGNESIUM: CPT

## 2019-04-10 PROCEDURE — 86140 C-REACTIVE PROTEIN: CPT

## 2019-04-10 RX ORDER — LACTOBACILLUS ACIDOPHILUS 100MM CELL
1 CAPSULE ORAL
Qty: 60 | Refills: 0
Start: 2019-04-10

## 2019-04-10 RX ADMIN — Medication 600 MILLIGRAM(S): at 11:58

## 2019-04-10 RX ADMIN — Medication 600 MILLIGRAM(S): at 10:56

## 2019-04-10 RX ADMIN — HEPARIN SODIUM 5000 UNIT(S): 5000 INJECTION INTRAVENOUS; SUBCUTANEOUS at 06:55

## 2019-04-10 RX ADMIN — Medication 1 TABLET(S): at 08:25

## 2019-04-10 RX ADMIN — LISINOPRIL 10 MILLIGRAM(S): 2.5 TABLET ORAL at 06:55

## 2019-04-10 RX ADMIN — PANTOPRAZOLE SODIUM 40 MILLIGRAM(S): 20 TABLET, DELAYED RELEASE ORAL at 06:55

## 2019-04-10 RX ADMIN — SERTRALINE 200 MILLIGRAM(S): 25 TABLET, FILM COATED ORAL at 12:12

## 2019-04-10 NOTE — DISCHARGE NOTE PROVIDER - HOSPITAL COURSE
49 yo Male PMH CLL, HTN, HLD, and depression presenting with 1 day history of fevers, chills, nausea, 1 episode vomiting, and chest discomfort. Patient denies ever feeling like this before, no recent travel. Earlier today, patient went to urgent care and when found to have a fever 102F, was sent to ED. Patient admits to fatigue, lightheadedness, 6/10 chest discomfort upon inhalation, numbness in his fingertips, n/v. According to family, patient has not eaten anything all day.      Patient denies SOB.         In the ED:     vitals: 98.7, 76, 104/66, RR 16, spo2 96% RA    labs significant for .93    s/p 3L NS bolus, rocephin, and currently on zithroma,     Flu A/B/ RSV- negative    EKG- NSR rate 85    CXR showed Suspicion of infiltrate at the medial right lung base.     RVP was positive for rhino/enterovirus        Pt. was admitted with ID consultation.  He was started on IV antibiotics.  Blood cx were positive for streptococcus pneumoniae.  Pt. had CT A/P  showing Hepatosplenomegaly. Extensive mesenteric lymphadenopathy. Retroperitoneal para-aortic, pelvic and bilateral iliac lymphadenopathy noted. 1.2 cm hypodense focus identified in the region of the pancreas     head.  Consolidative changes identified within the visualized portions of the     right middle lobe, right lower lobe and left lower lobe lung parenchyma.Expansion of the right iliac bone is identified within underlying permeative lesion suggested without evidence for cortical interruption.   Pt. had oncology consult recommending outpatient follow up with his oncologist.  His  immunoglobulin levels were low and patient received dose of IVIG and tolerated it well.          Pt. clinically improved.  His pleuritic chest pain resolved.  Repeat blood cx have been negative.  On day of discharge patient is in no distress.  Afebrile and hemodynamically stable.          Completion of discharge in 35 minutes.

## 2019-04-10 NOTE — DISCHARGE NOTE NURSING/CASE MANAGEMENT/SOCIAL WORK - NSDCDPATPORTLINK_GEN_ALL_CORE
You can access the [facility] Patient Portal, offered by [facility] [place], by registering with the following website: [email or web address]

## 2019-04-10 NOTE — DISCHARGE NOTE PROVIDER - CARE PROVIDER_API CALL
Chace Meier)  Medicine  Hematology  Onclgy  [address] [place] [address], [place] [address]  Phone: [phone]  Fax: [phone]  Follow Up Time:

## 2019-04-10 NOTE — DISCHARGE NOTE PROVIDER - NSDCCPCAREPLAN_GEN_ALL_CORE_FT
PRINCIPAL DISCHARGE DIAGNOSIS  Diagnosis: Gram positive septicemia  Assessment and Plan of Treatment: Streptococcus pneumoniae bacteremia.  Complete course of antibiotic.  Follow up chest x-ray in 1 week for resolution of pneumonia.      SECONDARY DISCHARGE DIAGNOSES  Diagnosis: CLL (chronic lymphocytic leukemia)  Assessment and Plan of Treatment: follow up with your oncologist in 1 week

## 2019-04-10 NOTE — PROGRESS NOTE ADULT - REASON FOR ADMISSION
flu like symptoms

## 2019-04-10 NOTE — PROGRESS NOTE ADULT - SUBJECTIVE AND OBJECTIVE BOX
ID progress note     Name: MINA PERERA  Age: 48y  Gender: Male  MRN: [id number]    Interval History-- Events noted, feels well, no cough , no right sided chest pain, able to take deep breath without cough . Slept well, has some night sweats last night .   Notes reviewed    Past Medical History--  Hyperlipidemia  Hypertension  Acute depression  CLL (chronic lymphocytic leukemia)  S/P hernia surgery      For details regarding the patient's social history, family history, and other miscellaneous elements, please refer the initial infectious diseases consultation and/or the admitting history and physical examination for this admission.    Allergies--  Allergies    No Known Allergies    Intolerances        Medications--  Antibiotics:  levoFLOXacin IVPB      levoFLOXacin IVPB 750 milliGRAM(s) IV Intermittent every 24 hours    Immunologic:  influenza   Vaccine 0.5 milliLiter(s) IntraMuscular once    Other:  acetaminophen   Tablet .. PRN  ALPRAZolam PRN  atorvastatin  heparin  Injectable  ibuprofen  Tablet. PRN  lactobacillus acidophilus  lisinopril  ondansetron Injectable PRN  pantoprazole    Tablet  sertraline      Review of Systems--  A 10-point review of systems was obtained.     Pertinent positives and negatives--  Constitutional: No fevers. No Chills. No Rigors.   Cardiovascular: No chest pain. No palpitations.  Respiratory: No shortness of breath. No cough.  Gastrointestinal: No nausea or vomiting. No diarrhea or constipation.   Psychiatric: Pleasant. Appropriate affect.    Review of systems otherwise negative except as previously noted.    Physical Examination--  Vital Signs: T(F): 97.5 (04-10-19 @ 06:02), Max: 97.9 (19 @ 14:01)  HR: 65 (04-10-19 @ 06:02)  BP: 120/82 (04-10-19 @ 06:02)  RR: 18 (04-10-19 @ 06:02)  SpO2: 94% (04-10-19 @ 06:02)  Wt(kg): --  General: Nontoxic-appearing Male in no acute distress.  HEENT: AT/NC. PERRL. EOMI. Anicteric. Conjunctiva pink and moist. Oropharynx clear. Dentition fair.  Neck: Not rigid. No sense of mass.  Nodes: None palpable.  Lungs: Coasted breath sounds  right base  without rales, wheezing or rhonchi  Heart: Regular rate and rhythm. No Murmur. No rub. No gallop. No palpable thrill.  Abdomen: Bowel sounds present and normoactive. Soft. Nondistended. Nontender. No sense of mass. Splenomegaly.  Back: No spinal tenderness. No costovertebral angle tenderness.   Extremities: No cyanosis or clubbing. No edema.   Skin: Warm. Dry. Good turgor. No rash. No vasculitic stigmata.  Psychiatric: Appropriate affect and mood for situation.         Laboratory Studies--  CBC                        10.9   103.73 )-----------( 89       ( 10 Apr 2019 08:39 )             36.1       Chemistries  04-10    147<H>  |  109<H>  |  18  ----------------------------<  84  4.1   |  28  |  1.10    Ca    8.6      10 Apr 2019 08:39  Mg     1.8     04-10    Immunoglobulins Panel (19 @ 18:46)    Quantitative Ig mg/dL    Quantitative IgA: 8 mg/dL    Quantitative IgM: <10 mg/dL    PAUL Kappa: 4.98 mg/dL    PAUL Lambda: 0.31 mg/dL    Dellrose/Lambda Free Light Chain Ratio, Serum: 16.06 Ratio    Sedimentation Rate, Erythrocyte (19 @ 14:10)    Sedimentation Rate, Erythrocyte: 25 mm/hr    C-Reactive Protein, Serum (19 @ 16:33)    C-Reactive Protein, Serum: 26.49 mg/dL    Lactate, Blood: 0.9 mmol/L (19 @ 16:14)  Lactate, Blood: 0.7 mmol/L (19 @ 18:54)    Culture Data    Culture - Urine (collected 2019 18:51)  Source: .Urine Clean Catch (Midstream)  Final Report (2019 12:01):    No growth    Culture - Blood (collected 2019 18:43)  Source: .Blood Blood  Preliminary Report (2019 19:01):    No growth to date.    Culture - Blood (collected 2019 18:05)  Source: .Blood Blood-Peripheral  Preliminary Report (2019 19:01):    No growth to date.    Culture - Blood (collected 2019 18:05)  Source: .Blood Blood-Peripheral  Preliminary Report (2019 19:01):    No growth to date.    Culture - Blood (collected 2019 00:43)  Source: .Blood Blood  Gram Stain (2019 15:25):    Growth in aerobic bottle: Gram Positive Cocci in Pairs and Chains    Growth in anaerobic bottle: Gram Positive Cocci in Pairs and Chains  Final Report (2019 09:04):    Growth in aerobic and anaerobic bottles: Streptococcus pneumoniae    Therapy requires maximum dose of Ceftriaxone and/or    Penicillin. Interpretive criteria as follows:    Ceftriaxone breakpoints for meningitis infections:    <=0.5=Sensitive, 1.0=Intermediate, >=2.0=Resistant    Penicillin breakpoints for meningitis infections:    <=0.06=Sensitive, >= 0.12=Resistant    Ceftriaxone breakpoints for non-meningitis infections:    <=1.0=Sensitive, 2.0=Intermediate, >=4.0=Resistant    Penicillin breakpoints for non-meningitis infections:    <=2.0=Sensitive, 4.0=Intermediate, >=8.0=Resistant    Oral Penicillin breakpoints:    <=0.06=Sensitive, 0.12-1.0=Intermediate, >=2.0=Resistant    Please note: In case of suspected meningitis, CSF    interpretive criteria must be used independent of specimen source.    "Due to technical problems, Proteus sp. will Not be reported as part of    the BCID panel until further notice"    ***Blood Panel PCR results on this specimen are available    approximately 3 hours after the Gram stain result.***    Gram stain, PCR, and/or culture results may not always    correspond due to difference in methodologies.    ************************************************************    This PCR assay was performed using [employer].    The following targets aretested for: Enterococcus,    vancomycin resistant enterococci, Listeria monocytogenes,    coagulase negative staphylococci, S. aureus,    methicillin resistant S. aureus, Streptococcus agalactiae    (Group B), S. pneumoniae, S. pyogenes (Group A),    Acinetobacter baumannii, Enterobacter cloacae, E. coli,    Klebsiella oxytoca, K. pneumoniae, Proteus sp.,    Serratia marcescens, Haemophilus influenzae,    Neisseria meningitidis, Pseudomonas aeruginosa, Candida    albicans, C. glabrata, C krusei, C parapsilosis,    C. tropicalis and the KPC resistance gene.  Organism: Blood Culture PCR  Streptococcus pneumoniae (2019 09:04)  Organism: Streptococcus pneumoniae (2019 09:04)  Organism: Streptococcus pneumoniae (2019 09:04)  Organism: Blood Culture PCR (2019 09:04)    Culture - Blood (collected 2019 00:43)  Source: .Blood Blood  Gram Stain (2019 15:24):    Growth in aerobic bottle: Gram Positive Cocci in Pairs and Chains    Growth in anaerobic bottle: Gram Positive Cocci in Pairs and Chains  Final Report (2019 09:05):    Growth in aerobic and anaerobic bottles: Streptococcus pneumoniae    See previous culture [id number]      RADIOLOGY:  Xray Chest 2 Views PA/Lat (19 @ 10:24) >  The heart size and mediastinum is within normal limits.      There is right middle lobe and left lower lobe airspace consolidation,   likely reflecting multifocal pneumonia in the appropriate clinical   setting.  There are probable small posterior pleural effusions bilaterally.      Impression:    Findings as discussed above.    CT Abdomen and Pelvis No Cont (19 @ 16:33) >    IMPRESSION:    1. Hepatosplenomegaly. Extensive mesenteric lymphadenopathy.   Retroperitoneal para-aortic, pelvic and bilateral iliac lymphadenopathy   noted. 1.2 cm hypodense focus identified in the region of the pancreas   head. Further evaluation with contrast-enhanced CT evaluation of the   abdomen and pelvis (both oral and IV contrast) is recommended.  2.Consolidative changes identified within the visualized portions of the   right middle lobe, right lower lobe and left lower lobe lung parenchyma.  3. Expansion of the right iliac bone is identified within underlying   permeative lesion suggested without evidence for cortical interruption.   An indeterminate sclerotic focus is identified within the left iliac   bone. Further evaluation with MRI of the osseous pelvis recommended.        Assessment--  47 yo Male PMH CLL x 10 years  not on any medications,  HTN, HLD, and depression presenting with 2 day history of fevers, chills, generalized malaise and nausea, 1 episode vomiting, and chest discomfort. He has gram positive bacteremia likely strep species , with CXR suspicious for RML pneumonia this could be pneumococcal pneumonia with bacteremia and sepsis . Blood cs confirm Strep pneumoniae .  Ct andsomen shows RML> RLL and LLL consoldation so has multilobar pneumococcal pneumonia     He is immunocompromised and has  hypogammaglobinemia based on Immunoglobulin panel. He is s/p IVIG infusion on 19       He will need Pneumovax on dc  and he is advised to take yearly Influenza  vaccine     Suggestions--  - will continue with IV  Levaquin 750 mg daily , change to po Levaquin 750 mg daily x 10 days from am  - add bacid 1 tab bid   - will need repeat CXR in 1 week after dc   - patient wishes to speak with his hematologist about pneumococcal vaccine   - Stable for  dc today as repeat blood cs remains negative and he is afebrile over 24 hours      Continue with present regime .  Appropriate use of antibiotics and adverse effects reviewed.    I have discussed the above plan of care with patient and his wife present at bedside  in detail. They expressed understanding of the treatment plan . Risks, benefits and alternatives discussed in detail. I have asked if they have any questions or concerns and appropriately addressed them to the best of my ability .      > 35 minutes spent in direct patient care reviewing  the notes, lab data/ imaging , discussion with multidisciplinary team. All questions were addressed and answered to the best of my capacity .    Thank you for allowing me to participate in the care of your patient .        Praveen Hall MD  [phone]

## 2019-04-10 NOTE — PROGRESS NOTE ADULT - SUBJECTIVE AND OBJECTIVE BOX
CHIEF COMPLAINT/INTERVAL HISTORY:  Pt. seen and evaluated for strep bacteremia and pneumonia.  Pt. is in no distress.  Feeling well.  Afebrile.  Tolerating antibiotic.     REVIEW OF SYSTEMS:  No fever, CP, SOB, or abdominal pain.     Vital Signs Last 24 Hrs  T(C): 36.4 (10 Apr 2019 06:02), Max: 36.6 (09 Apr 2019 14:01)  T(F): 97.5 (10 Apr 2019 06:02), Max: 97.9 (09 Apr 2019 14:01)  HR: 65 (10 Apr 2019 06:02) (65 - 72)  BP: 120/82 (10 Apr 2019 06:02) (120/82 - 129/83)  BP(mean): --  RR: 18 (10 Apr 2019 06:02) (18 - 19)  SpO2: 94% (10 Apr 2019 06:02) (94% - 95%)    PHYSICAL EXAM:  GENERAL: NAD  HEENT: EOMI, hearing normal, conjunctiva and sclera clear  Chest: Diminished at R. base, no wheezing  CV: S1S2, RRR,   GI: soft, +BS, NT/ND  Musculoskeletal: no edema  Psychiatric: affect nL, mood nL  Skin: warm and dry    LABS:                        10.9   103.73 )-----------( 89       ( 10 Apr 2019 08:39 )             36.1     04-10    147<H>  |  109<H>  |  18  ----------------------------<  84  4.1   |  28  |  1.10    Ca    8.6      10 Apr 2019 08:39  Mg     1.8     04-10    Assessment and Plan:  -Strep bacteremia with sepsis POA and Acute hypoxic respiratory failure 2/2 pneumonia:  Repeat blood cx NGTD.  Continue Levaquin 750mg IV daily.  ID f/u  -Chest discomfort:  pleuritic in nature likely 2/2 pneumonia.  Pt. reports resolution.  Ibuprofen PRN and Tylenol PRN  -Rhinovirus infection:  supportive care  -CLL:  CT A/P  Hepatosplenomegaly. Extensive mesenteric lymphadenopathy. Retroperitoneal para-aortic, pelvic and bilateral iliac lymphadenopathy noted. 1.2 cm hypodense focus identified in the region of the pancreas head. Further evaluation with contrast-enhanced CT evaluation of the abdomen and pelvis (both oral and IV contrast) is recommended.  Consolidative changes identified within the visualized portions of the right middle lobe, right lower lobe and left lower lobe lung parenchyma.  Expansion of the right iliac bone is identified within underlying permeative lesion suggested without evidence for cortical interruption. An indeterminate sclerotic focus is identified within the left iliac bone. Further evaluation with MRI of the osseous pelvis recommended.  Outpatient follow up with his oncologist.   -Hypogammaglobulinemia:  s/p IVIG.  -HTN:  continue Lisinopril 10mg PO daily  -HLD:  continue Lipitor 20mg PO QHS  -Depression:  continue Zoloft 200mg PO daily  -VTE ppx:  Heparin 5000 units SQ Q12h

## 2019-04-11 NOTE — DISCUSSION/SUMMARY
[Home] : patient was discharged to home [Follow Up Appt with Provider within 7 days] : follow up appt with provider within 7 days [Med Rec Performed] : med rec performed [FreeTextEntry1] : Patient at home, feels much better, discharged with levaquin, has follow up with oncologist,

## 2019-04-12 PROBLEM — I10 ESSENTIAL (PRIMARY) HYPERTENSION: Chronic | Status: ACTIVE | Noted: 2019-04-06

## 2019-04-12 PROBLEM — E78.5 HYPERLIPIDEMIA, UNSPECIFIED: Chronic | Status: ACTIVE | Noted: 2019-04-06

## 2019-04-12 LAB
CULTURE RESULTS: SIGNIFICANT CHANGE UP
SPECIMEN SOURCE: SIGNIFICANT CHANGE UP

## 2019-04-16 ENCOUNTER — APPOINTMENT (OUTPATIENT)
Dept: INTERNAL MEDICINE | Facility: CLINIC | Age: 49
End: 2019-04-16
Payer: COMMERCIAL

## 2019-04-16 VITALS
WEIGHT: 178 LBS | TEMPERATURE: 98.5 F | HEART RATE: 64 BPM | DIASTOLIC BLOOD PRESSURE: 77 MMHG | BODY MASS INDEX: 24.11 KG/M2 | SYSTOLIC BLOOD PRESSURE: 126 MMHG | OXYGEN SATURATION: 95 % | RESPIRATION RATE: 17 BRPM | HEIGHT: 72 IN

## 2019-04-16 DIAGNOSIS — Z87.898 PERSONAL HISTORY OF OTHER SPECIFIED CONDITIONS: ICD-10-CM

## 2019-04-16 PROCEDURE — 99496 TRANSJ CARE MGMT HIGH F2F 7D: CPT

## 2019-04-20 ENCOUNTER — FORM ENCOUNTER (OUTPATIENT)
Age: 49
End: 2019-04-20

## 2019-04-21 ENCOUNTER — APPOINTMENT (OUTPATIENT)
Dept: MRI IMAGING | Facility: CLINIC | Age: 49
End: 2019-04-21
Payer: COMMERCIAL

## 2019-04-21 ENCOUNTER — OUTPATIENT (OUTPATIENT)
Dept: OUTPATIENT SERVICES | Facility: HOSPITAL | Age: 49
LOS: 1 days | End: 2019-04-21
Payer: COMMERCIAL

## 2019-04-21 DIAGNOSIS — K86.9 DISEASE OF PANCREAS, UNSPECIFIED: ICD-10-CM

## 2019-04-21 PROCEDURE — 74183 MRI ABD W/O CNTR FLWD CNTR: CPT | Mod: 26

## 2019-04-21 PROCEDURE — A9585: CPT

## 2019-04-21 PROCEDURE — 74183 MRI ABD W/O CNTR FLWD CNTR: CPT

## 2019-04-22 ENCOUNTER — APPOINTMENT (OUTPATIENT)
Dept: HEMATOLOGY ONCOLOGY | Facility: CLINIC | Age: 49
End: 2019-04-22
Payer: COMMERCIAL

## 2019-04-22 VITALS — OXYGEN SATURATION: 98 % | HEART RATE: 65 BPM | WEIGHT: 175 LBS | BODY MASS INDEX: 23.73 KG/M2

## 2019-04-22 DIAGNOSIS — K86.9 DISEASE OF PANCREAS, UNSPECIFIED: ICD-10-CM

## 2019-04-22 PROCEDURE — 99215 OFFICE O/P EST HI 40 MIN: CPT

## 2019-04-22 PROCEDURE — 85025 COMPLETE CBC W/AUTO DIFF WBC: CPT

## 2019-04-22 RX ORDER — ACYCLOVIR 400 MG/1
400 TABLET ORAL DAILY
Qty: 90 | Refills: 5 | Status: ACTIVE | COMMUNITY
Start: 2019-04-22 | End: 1900-01-01

## 2019-04-23 ENCOUNTER — APPOINTMENT (OUTPATIENT)
Dept: HEMATOLOGY ONCOLOGY | Facility: CLINIC | Age: 49
End: 2019-04-23

## 2019-04-24 ENCOUNTER — APPOINTMENT (OUTPATIENT)
Dept: HEMATOLOGY ONCOLOGY | Facility: CLINIC | Age: 49
End: 2019-04-24

## 2019-04-26 ENCOUNTER — FORM ENCOUNTER (OUTPATIENT)
Age: 49
End: 2019-04-26

## 2019-04-26 LAB
ALBUMIN SERPL ELPH-MCNC: 4.7 G/DL
ALP BLD-CCNC: 78 U/L
ALT SERPL-CCNC: 38 U/L
ANION GAP SERPL CALC-SCNC: 16 MMOL/L
AST SERPL-CCNC: 23 U/L
BILIRUB SERPL-MCNC: 0.2 MG/DL
BUN SERPL-MCNC: 29 MG/DL
CALCIUM SERPL-MCNC: 9.5 MG/DL
CHLORIDE SERPL-SCNC: 103 MMOL/L
CO2 SERPL-SCNC: 22 MMOL/L
CREAT SERPL-MCNC: 0.95 MG/DL
DEPRECATED KAPPA LC FREE/LAMBDA SER: 26.6 RATIO
FERRITIN SERPL-MCNC: 42 NG/ML
GLUCOSE SERPL-MCNC: 85 MG/DL
IGA SER QL IEP: 12 MG/DL
IGG SER QL IEP: 555 MG/DL
IGM SER QL IEP: <10 MG/DL
IRON SATN MFR SERPL: 19 %
IRON SERPL-MCNC: 78 UG/DL
KAPPA LC CSF-MCNC: 0.3 MG/DL
KAPPA LC SERPL-MCNC: 7.98 MG/DL
LDH SERPL-CCNC: 148 U/L
POTASSIUM SERPL-SCNC: 4.7 MMOL/L
PROT SERPL-MCNC: 6.6 G/DL
SODIUM SERPL-SCNC: 141 MMOL/L
TIBC SERPL-MCNC: 409 UG/DL
UIBC SERPL-MCNC: 331 UG/DL
URATE SERPL-MCNC: 3.6 MG/DL

## 2019-04-27 ENCOUNTER — APPOINTMENT (OUTPATIENT)
Dept: RADIOLOGY | Facility: CLINIC | Age: 49
End: 2019-04-27
Payer: COMMERCIAL

## 2019-04-27 ENCOUNTER — OUTPATIENT (OUTPATIENT)
Dept: OUTPATIENT SERVICES | Facility: HOSPITAL | Age: 49
LOS: 1 days | End: 2019-04-27
Payer: COMMERCIAL

## 2019-04-27 DIAGNOSIS — J18.9 PNEUMONIA, UNSPECIFIED ORGANISM: ICD-10-CM

## 2019-04-27 PROCEDURE — 71046 X-RAY EXAM CHEST 2 VIEWS: CPT | Mod: 26

## 2019-04-27 PROCEDURE — 71046 X-RAY EXAM CHEST 2 VIEWS: CPT

## 2019-05-01 ENCOUNTER — APPOINTMENT (OUTPATIENT)
Dept: PAIN MANAGEMENT | Facility: CLINIC | Age: 49
End: 2019-05-01

## 2019-05-07 NOTE — RESULTS/DATA
[FreeTextEntry1] : CBC done 19:\par WBC: 182.7\par A.86\par ANC: 9.87\par Hgb: 12.8\par Hct: 39.7\par Plt: 216.0

## 2019-05-07 NOTE — PHYSICAL EXAM
[Fully active, able to carry on all pre-disease performance without restriction] : Status 0 - Fully active, able to carry on all pre-disease performance without restriction [Normal] : affect appropriate [de-identified] : EOMI, anicteric sclera, conjunctiva normal [de-identified] : B/l 1 cm cervical and axillary lymph nodes, Scattered 1 cm lymph nodes in the L and R inguinal area [de-identified] : MMM, + ear consolidate  [de-identified] : no c/c/e

## 2019-05-07 NOTE — ASSESSMENT
[FreeTextEntry1] : Mr. Hewitt is a 47 year old male CLL (unmutated IgVH), treatment naive, who presents for routine follow-up.\par \par Plan:\par 1)CBC reviewed, counts counts have increase with  progression of lymphocytosis likely due to recent infection. Will need to reassess counts in 2 weeks. \par 2) ENT: will refer patient to Dr. Sanders for earfullness and ? consolidate near TM of ear\par 3) Referral given for home IVIG and rx ppx antiviral given\par 4)continue routine health maintenance and age appropriate screening as indicated\par 5)RTO in 2 weeks, sooner PRN\par \par PMD:\par Melissa Evans

## 2019-05-07 NOTE — REVIEW OF SYSTEMS
[Negative] : Allergic/Immunologic [Fatigue] : fatigue [Fever] : no fever [Chills] : no chills [Night Sweats] : no night sweats [Lower Ext Edema] : no lower extremity edema [Chest Pain] : no chest pain [Recent Change In Weight] : ~T no recent weight change [Shortness Of Breath] : no shortness of breath [Cough] : no cough [Wheezing] : no wheezing [SOB on Exertion] : no shortness of breath during exertion [Abdominal Pain] : no abdominal pain [Constipation] : no constipation [Vomiting] : no vomiting [Joint Pain] : no joint pain [Diarrhea] : no diarrhea [Joint Stiffness] : no joint stiffness [Muscle Pain] : no muscle pain [Muscle Weakness] : no muscle weakness [Easy Bleeding] : no tendency for easy bleeding [FreeTextEntry4] : +ear fullness [Easy Bruising] : no tendency for easy bruising

## 2019-05-07 NOTE — HISTORY OF PRESENT ILLNESS
[Disease:__________________________] : Disease: [unfilled] [Treatment Protocol] : Treatment Protocol [de-identified] : CLL Markers:\par normal FISH\par CD38+\par unmutated.  [de-identified] : Mr. Hewitt is a a 47 year old male with CLL (dx 2006), treatment naive, who presents for routine follow-up. \par \par He is s/p recent admission to [facility] for PNA and sepsis for 4 days. He was started on IVIG and also had a CT abd/plevis revealing a hypodense lesion on pancreatic head. He feels tired and wiped out after the admission last week, however he is slowly getting back to his daily routines. He has an MRI of abd done yesterday and result pending. He has headaches and fullness in his ears still.  No fevers or night sweats. Appeitite is ok. Weight stable. No chest pain or shortness of breath. [FreeTextEntry1] : Untreated

## 2019-05-15 ENCOUNTER — APPOINTMENT (OUTPATIENT)
Dept: HEMATOLOGY ONCOLOGY | Facility: CLINIC | Age: 49
End: 2019-05-15
Payer: COMMERCIAL

## 2019-05-15 VITALS
BODY MASS INDEX: 23.6 KG/M2 | SYSTOLIC BLOOD PRESSURE: 125 MMHG | OXYGEN SATURATION: 99 % | WEIGHT: 174 LBS | DIASTOLIC BLOOD PRESSURE: 70 MMHG | HEART RATE: 77 BPM

## 2019-05-15 VITALS — TEMPERATURE: 98.6 F

## 2019-05-15 PROCEDURE — 99215 OFFICE O/P EST HI 40 MIN: CPT | Mod: 25

## 2019-05-15 PROCEDURE — 85025 COMPLETE CBC W/AUTO DIFF WBC: CPT

## 2019-05-16 LAB
ALBUMIN SERPL ELPH-MCNC: 5.1 G/DL
ALP BLD-CCNC: 82 U/L
ALT SERPL-CCNC: 37 U/L
ANION GAP SERPL CALC-SCNC: 12 MMOL/L
AST SERPL-CCNC: 30 U/L
BILIRUB SERPL-MCNC: 0.3 MG/DL
BUN SERPL-MCNC: 26 MG/DL
CALCIUM SERPL-MCNC: 9.9 MG/DL
CHLORIDE SERPL-SCNC: 101 MMOL/L
CO2 SERPL-SCNC: 25 MMOL/L
CREAT SERPL-MCNC: 0.87 MG/DL
GLUCOSE SERPL-MCNC: 91 MG/DL
LDH SERPL-CCNC: 169 U/L
POTASSIUM SERPL-SCNC: 4.6 MMOL/L
PROT SERPL-MCNC: 6.6 G/DL
SODIUM SERPL-SCNC: 138 MMOL/L
URATE SERPL-MCNC: 3.9 MG/DL

## 2019-05-16 NOTE — ASSESSMENT
[FreeTextEntry1] : Mr. Hewitt is a 48 year old male CLL (unmutated IgVH), treatment naive, who presents for follow-up.\par \par Plan:\par 1)CLL: continue to monitor, no need for active therapy at this time.\par 2) Hypogammaglobulinemia: home IVIG rx. taking daily ppx antiviral\par 3)continue routine health maintenance and age appropriate screening as indicated\par 4)RTO in 4 weeks, sooner PRN\par \par PMD:\par Melissa Evans

## 2019-05-16 NOTE — HISTORY OF PRESENT ILLNESS
[Disease:__________________________] : Disease: [unfilled] [Treatment Protocol] : Treatment Protocol [de-identified] : Mr. Hewitt is a a 48 year old male with CLL (dx 2006), treatment naive, who presents for routine follow-up. History notable for [facility] admission for management of PNA and sepsis for 4 days in April 2019, he received IVIG while in the hospital and will be transitioning to home infusions this Sunday. Post infection he had a lingering headache, for which he saw ENT and neurology and eventually resolved on its own. Denies fevers or night sweats. Appetite is ok. Weight stable. No chest pain or shortness of breath. [de-identified] : CLL Markers:\par normal FISH\par CD38+\par unmutated.  [FreeTextEntry1] : Untreated

## 2019-05-16 NOTE — PHYSICAL EXAM
[Fully active, able to carry on all pre-disease performance without restriction] : Status 0 - Fully active, able to carry on all pre-disease performance without restriction [Normal] : affect appropriate [de-identified] : EOMI, anicteric sclera, conjunctiva normal [de-identified] : MMM, + ear consolidate  [de-identified] : no c/c/e [de-identified] : B/l 1 cm cervical and axillary lymph nodes, Scattered 1 cm lymph nodes in the L and R inguinal area

## 2019-05-16 NOTE — RESULTS/DATA
[FreeTextEntry1] : CBC done 5/15/19:\par WBC:176.8\par A.43\par ANC: 9.87\par Hgb: 12.8\par Hct: 39.7\par Plt: 103.0

## 2019-05-16 NOTE — REVIEW OF SYSTEMS
[Negative] : Allergic/Immunologic [Fever] : no fever [Chills] : no chills [Night Sweats] : no night sweats [Recent Change In Weight] : ~T no recent weight change [Lower Ext Edema] : no lower extremity edema [Chest Pain] : no chest pain [Shortness Of Breath] : no shortness of breath [Wheezing] : no wheezing [SOB on Exertion] : no shortness of breath during exertion [Cough] : no cough [Vomiting] : no vomiting [Abdominal Pain] : no abdominal pain [Constipation] : no constipation [Diarrhea] : no diarrhea [Joint Pain] : no joint pain [Joint Stiffness] : no joint stiffness [Muscle Pain] : no muscle pain [Muscle Weakness] : no muscle weakness [Easy Bleeding] : no tendency for easy bleeding [FreeTextEntry4] : +ear fullness [Easy Bruising] : no tendency for easy bruising

## 2019-06-05 ENCOUNTER — APPOINTMENT (OUTPATIENT)
Dept: GASTROENTEROLOGY | Facility: CLINIC | Age: 49
End: 2019-06-05

## 2019-06-19 ENCOUNTER — APPOINTMENT (OUTPATIENT)
Dept: HEMATOLOGY ONCOLOGY | Facility: CLINIC | Age: 49
End: 2019-06-19
Payer: COMMERCIAL

## 2019-06-19 VITALS — WEIGHT: 178 LBS | OXYGEN SATURATION: 99 % | BODY MASS INDEX: 24.14 KG/M2 | HEART RATE: 68 BPM

## 2019-06-19 VITALS — SYSTOLIC BLOOD PRESSURE: 118 MMHG | DIASTOLIC BLOOD PRESSURE: 70 MMHG

## 2019-06-19 PROCEDURE — 99215 OFFICE O/P EST HI 40 MIN: CPT

## 2019-06-19 PROCEDURE — 85025 COMPLETE CBC W/AUTO DIFF WBC: CPT

## 2019-06-20 LAB
ALBUMIN SERPL ELPH-MCNC: 4.8 G/DL
ALP BLD-CCNC: 79 U/L
ALT SERPL-CCNC: 37 U/L
ANION GAP SERPL CALC-SCNC: 11 MMOL/L
AST SERPL-CCNC: 31 U/L
BILIRUB SERPL-MCNC: 0.6 MG/DL
BUN SERPL-MCNC: 24 MG/DL
CALCIUM SERPL-MCNC: 9.8 MG/DL
CHLORIDE SERPL-SCNC: 104 MMOL/L
CO2 SERPL-SCNC: 25 MMOL/L
CREAT SERPL-MCNC: 1.09 MG/DL
DEPRECATED KAPPA LC FREE/LAMBDA SER: 28.45 RATIO
GLUCOSE SERPL-MCNC: 86 MG/DL
IGA SER QL IEP: 10 MG/DL
IGG SER QL IEP: 523 MG/DL
IGM SER QL IEP: <10 MG/DL
KAPPA LC CSF-MCNC: 0.29 MG/DL
KAPPA LC SERPL-MCNC: 8.25 MG/DL
LDH SERPL-CCNC: 166 U/L
POTASSIUM SERPL-SCNC: 4.7 MMOL/L
PROT SERPL-MCNC: 6.6 G/DL
SODIUM SERPL-SCNC: 140 MMOL/L
URATE SERPL-MCNC: 5.1 MG/DL

## 2019-06-20 NOTE — PHYSICAL EXAM
[Fully active, able to carry on all pre-disease performance without restriction] : Status 0 - Fully active, able to carry on all pre-disease performance without restriction [Normal] : affect appropriate [de-identified] : EOMI, anicteric sclera, conjunctiva normal [de-identified] : KONRAD [de-identified] : no c/c/e [de-identified] : some palpable LN in the abdomen, do not cause symptoms to patient [de-identified] : B/l 1 cm cervical and axillary lymph nodes, Scattered 1 cm lymph nodes in the L and R inguinal area-all stable

## 2019-06-20 NOTE — ASSESSMENT
[FreeTextEntry1] : Mr. Hewitt is a 48 year old male CLL (unmutated IgVH), treatment naive, who presents for follow-up.\par \par Plan:\par 1)CLL: continue to monitor, he has leukocytosis and palpable lymphadenopathy but this is not symptomatic. Will continue to observe closely, no need for active therapy at this time.\par 2) Hypogammaglobulinemia: home IVIG rx. taking daily ppx antiviral\par 3)continue routine health maintenance and age appropriate screening as indicated\par 4)RTO in 4 weeks, sooner PRN\par \par PMD:\par Melissa Evans

## 2019-06-20 NOTE — HISTORY OF PRESENT ILLNESS
[Disease:__________________________] : Disease: [unfilled] [Treatment Protocol] : Treatment Protocol [de-identified] : Mr. Hewitt is a a 48 year old male with CLL (dx 2006), treatment naive, who presents for routine follow-up. History notable for [facility] admission for management of PNA and sepsis for 4 days in April 2019, he received IVIG while in the hospital and will be transitioned to home infusions every 4 weeks (he has had issues with scheduling). Post infection he had a lingering headache, for which he saw ENT and neurology and eventually resolved on its own. He presents today for follow up\par \par He states he is doing well and has no new complaints today. Denies fevers or night sweats. Appetite is ok. Weight stable. No chest pain or shortness of breath. [FreeTextEntry1] : Untreated [de-identified] : CLL Markers:\par normal FISH\par CD38+\par unmutated.

## 2019-06-20 NOTE — REVIEW OF SYSTEMS
[Negative] : Allergic/Immunologic [Chills] : no chills [Fever] : no fever [Night Sweats] : no night sweats [Recent Change In Weight] : ~T no recent weight change [Easy Bleeding] : no tendency for easy bleeding [Easy Bruising] : no tendency for easy bruising

## 2019-06-20 NOTE — RESULTS/DATA
[FreeTextEntry1] : CBC done 19:\par WBC:169.9\par A.19\par ANC: 8.66\par Hgb: 13.1\par Hct: 40.4\par Plt: 122.0

## 2019-09-06 ENCOUNTER — APPOINTMENT (OUTPATIENT)
Dept: INTERNAL MEDICINE | Facility: CLINIC | Age: 49
End: 2019-09-06

## 2019-09-17 ENCOUNTER — APPOINTMENT (OUTPATIENT)
Dept: HEMATOLOGY ONCOLOGY | Facility: CLINIC | Age: 49
End: 2019-09-17
Payer: COMMERCIAL

## 2019-09-17 VITALS — BODY MASS INDEX: 24.41 KG/M2 | OXYGEN SATURATION: 99 % | TEMPERATURE: 98.2 F | WEIGHT: 180 LBS | HEART RATE: 74 BPM

## 2019-09-17 PROCEDURE — 99215 OFFICE O/P EST HI 40 MIN: CPT

## 2019-09-17 PROCEDURE — 85025 COMPLETE CBC W/AUTO DIFF WBC: CPT

## 2019-09-19 ENCOUNTER — RX RENEWAL (OUTPATIENT)
Age: 49
End: 2019-09-19

## 2019-09-23 ENCOUNTER — MEDICATION RENEWAL (OUTPATIENT)
Age: 49
End: 2019-09-23

## 2019-09-23 LAB
ALBUMIN SERPL ELPH-MCNC: 4.7 G/DL
ALP BLD-CCNC: 73 U/L
ALT SERPL-CCNC: 30 U/L
ANION GAP SERPL CALC-SCNC: 13 MMOL/L
AST SERPL-CCNC: 28 U/L
BILIRUB SERPL-MCNC: 0.4 MG/DL
BUN SERPL-MCNC: 21 MG/DL
CALCIUM SERPL-MCNC: 9.4 MG/DL
CHLORIDE SERPL-SCNC: 104 MMOL/L
CO2 SERPL-SCNC: 23 MMOL/L
CREAT SERPL-MCNC: 0.95 MG/DL
DEPRECATED KAPPA LC FREE/LAMBDA SER: 34.52 RATIO
GLUCOSE SERPL-MCNC: 75 MG/DL
IGA SER QL IEP: 8 MG/DL
IGG SER QL IEP: 541 MG/DL
IGM SER QL IEP: <10 MG/DL
KAPPA LC CSF-MCNC: 0.25 MG/DL
KAPPA LC SERPL-MCNC: 8.63 MG/DL
LDH SERPL-CCNC: 204 U/L
POTASSIUM SERPL-SCNC: 4.5 MMOL/L
PROT SERPL-MCNC: 6.4 G/DL
SODIUM SERPL-SCNC: 140 MMOL/L
URATE SERPL-MCNC: 5.3 MG/DL

## 2019-09-26 NOTE — PHYSICAL EXAM
[Fully active, able to carry on all pre-disease performance without restriction] : Status 0 - Fully active, able to carry on all pre-disease performance without restriction [Normal] : affect appropriate [de-identified] : EOMI, anicteric sclera, conjunctiva normal [de-identified] : KONRAD [de-identified] : no c/c/e [de-identified] : some palpable LN in the abdomen, do not cause symptoms to patient [de-identified] : B/l 1 cm cervical and axillary lymph nodes, Scattered 1 cm lymph nodes in the L and R inguinal area-all stable

## 2019-09-26 NOTE — REVIEW OF SYSTEMS
[Negative] : Allergic/Immunologic [Fever] : no fever [Chills] : no chills [Night Sweats] : no night sweats [Recent Change In Weight] : ~T no recent weight change [Easy Bleeding] : no tendency for easy bleeding [Easy Bruising] : no tendency for easy bruising

## 2019-09-26 NOTE — ASSESSMENT
[FreeTextEntry1] : Mr. Hewitt is a 49 year old male CLL (unmutated IGHV), treatment naive, who presents for follow-up.\par \par Plan:\par 1)CLL: continue to monitor, he has leukocytosis and palpable lymphadenopathy but this is not symptomatic. Will continue to observe closely, no need for active therapy at this time.\par 2) Hypogammaglobulinemia: home IVIG\par 3)continue routine health maintenance and age appropriate screening as indicated\par 4)RTO in 4 weeks, sooner PRN\par \par PMD:\par Melissa Evans

## 2019-09-26 NOTE — HISTORY OF PRESENT ILLNESS
[Disease:__________________________] : Disease: [unfilled] [Treatment Protocol] : Treatment Protocol [de-identified] : Mr. Hewitt is a a 48 year old male with CLL (dx 2006), treatment naive, who presents for routine follow-up. History notable for [facility] admission for management of PNA and sepsis for 4 days in April 2019. He gets home infusions every 4 weeks (he has had issues with scheduling) without complications.\par \par He states he is doing well and has no new complaints today. Denies fevers or night sweats. Appetite is ok. Weight stable. No chest pain or shortness of breath. [de-identified] : CLL Markers:\par normal FISH\par CD38+\par unmutated.  [FreeTextEntry1] : Untreated

## 2019-09-30 ENCOUNTER — APPOINTMENT (OUTPATIENT)
Dept: INTERNAL MEDICINE | Facility: CLINIC | Age: 49
End: 2019-09-30
Payer: COMMERCIAL

## 2019-09-30 ENCOUNTER — NON-APPOINTMENT (OUTPATIENT)
Age: 49
End: 2019-09-30

## 2019-09-30 ENCOUNTER — LABORATORY RESULT (OUTPATIENT)
Age: 49
End: 2019-09-30

## 2019-09-30 VITALS
OXYGEN SATURATION: 94 % | DIASTOLIC BLOOD PRESSURE: 74 MMHG | HEART RATE: 71 BPM | SYSTOLIC BLOOD PRESSURE: 116 MMHG | HEIGHT: 72 IN | WEIGHT: 177 LBS | BODY MASS INDEX: 23.98 KG/M2

## 2019-09-30 DIAGNOSIS — Z23 ENCOUNTER FOR IMMUNIZATION: ICD-10-CM

## 2019-09-30 DIAGNOSIS — K86.2 CYST OF PANCREAS: ICD-10-CM

## 2019-09-30 DIAGNOSIS — J30.2 OTHER SEASONAL ALLERGIC RHINITIS: ICD-10-CM

## 2019-09-30 DIAGNOSIS — Z00.00 ENCOUNTER FOR GENERAL ADULT MEDICAL EXAMINATION W/OUT ABNORMAL FINDINGS: ICD-10-CM

## 2019-09-30 DIAGNOSIS — R16.2 HEPATOMEGALY WITH SPLENOMEGALY, NOT ELSEWHERE CLASSIFIED: ICD-10-CM

## 2019-09-30 DIAGNOSIS — E55.9 VITAMIN D DEFICIENCY, UNSPECIFIED: ICD-10-CM

## 2019-09-30 DIAGNOSIS — Z12.5 ENCOUNTER FOR SCREENING FOR MALIGNANT NEOPLASM OF PROSTATE: ICD-10-CM

## 2019-09-30 DIAGNOSIS — I10 ESSENTIAL (PRIMARY) HYPERTENSION: ICD-10-CM

## 2019-09-30 DIAGNOSIS — Z87.01 PERSONAL HISTORY OF PNEUMONIA (RECURRENT): ICD-10-CM

## 2019-09-30 DIAGNOSIS — K64.4 RESIDUAL HEMORRHOIDAL SKIN TAGS: ICD-10-CM

## 2019-09-30 DIAGNOSIS — Z09 ENCOUNTER FOR FOLLOW-UP EXAMINATION AFTER COMPLETED TREATMENT FOR CONDITIONS OTHER THAN MALIGNANT NEOPLASM: ICD-10-CM

## 2019-09-30 DIAGNOSIS — R09.89 OTHER SPECIFIED SYMPTOMS AND SIGNS INVOLVING THE CIRCULATORY AND RESPIRATORY SYSTEMS: ICD-10-CM

## 2019-09-30 DIAGNOSIS — F41.1 GENERALIZED ANXIETY DISORDER: ICD-10-CM

## 2019-09-30 DIAGNOSIS — F32.9 MAJOR DEPRESSIVE DISORDER, SINGLE EPISODE, UNSPECIFIED: ICD-10-CM

## 2019-09-30 DIAGNOSIS — R59.0 LOCALIZED ENLARGED LYMPH NODES: ICD-10-CM

## 2019-09-30 DIAGNOSIS — Z86.19 PERSONAL HISTORY OF OTHER INFECTIOUS AND PARASITIC DISEASES: ICD-10-CM

## 2019-09-30 DIAGNOSIS — E78.5 HYPERLIPIDEMIA, UNSPECIFIED: ICD-10-CM

## 2019-09-30 LAB
DATE COLLECTED: NORMAL
HEMOCCULT SP1 STL QL: NEGATIVE

## 2019-09-30 PROCEDURE — 90686 IIV4 VACC NO PRSV 0.5 ML IM: CPT

## 2019-09-30 PROCEDURE — 36415 COLL VENOUS BLD VENIPUNCTURE: CPT

## 2019-09-30 PROCEDURE — 99396 PREV VISIT EST AGE 40-64: CPT | Mod: 25

## 2019-09-30 PROCEDURE — 90471 IMMUNIZATION ADMIN: CPT

## 2019-09-30 PROCEDURE — 82270 OCCULT BLOOD FECES: CPT

## 2019-09-30 PROCEDURE — 93000 ELECTROCARDIOGRAM COMPLETE: CPT

## 2019-09-30 RX ORDER — IBUPROFEN 600 MG/1
600 TABLET, FILM COATED ORAL
Qty: 45 | Refills: 0 | Status: DISCONTINUED | COMMUNITY
Start: 2017-03-11 | End: 2019-09-30

## 2019-09-30 RX ORDER — PNEUMOCOCCAL 13-VALENT CONJUGATE VACCINE 2.2; 2.2; 2.2; 2.2; 2.2; 4.4; 2.2; 2.2; 2.2; 2.2; 2.2; 2.2; 2.2 UG/.5ML; UG/.5ML; UG/.5ML; UG/.5ML; UG/.5ML; UG/.5ML; UG/.5ML; UG/.5ML; UG/.5ML; UG/.5ML; UG/.5ML; UG/.5ML; UG/.5ML
INJECTION, SUSPENSION INTRAMUSCULAR
Qty: 1 | Refills: 0 | Status: COMPLETED | COMMUNITY
Start: 2019-07-26 | End: 2019-07-27

## 2019-09-30 RX ORDER — BACILLUS COAGULANS/INULIN 1B-250 MG
CAPSULE ORAL
Refills: 0 | Status: DISCONTINUED | COMMUNITY
End: 2019-09-30

## 2019-09-30 RX ORDER — LEVOFLOXACIN 500 MG/1
500 TABLET, FILM COATED ORAL
Refills: 0 | Status: DISCONTINUED | COMMUNITY
End: 2019-09-30

## 2019-10-01 LAB
25(OH)D3 SERPL-MCNC: 46.7 NG/ML
ALBUMIN SERPL ELPH-MCNC: 4.9 G/DL
ALP BLD-CCNC: 85 U/L
ALT SERPL-CCNC: 27 U/L
ANION GAP SERPL CALC-SCNC: 13 MMOL/L
APPEARANCE: CLEAR
AST SERPL-CCNC: 32 U/L
BASOPHILS # BLD AUTO: 0 K/UL
BASOPHILS NFR BLD AUTO: 0 %
BILIRUB SERPL-MCNC: 0.3 MG/DL
BILIRUBIN URINE: NEGATIVE
BLOOD URINE: NEGATIVE
BUN SERPL-MCNC: 27 MG/DL
CALCIUM SERPL-MCNC: 9.3 MG/DL
CHLORIDE SERPL-SCNC: 102 MMOL/L
CHOLEST SERPL-MCNC: 181 MG/DL
CHOLEST/HDLC SERPL: 5.3 RATIO
CO2 SERPL-SCNC: 23 MMOL/L
COLOR: NORMAL
CREAT SERPL-MCNC: 1.11 MG/DL
CREAT SPEC-SCNC: 128 MG/DL
EOSINOPHIL # BLD AUTO: 1.79 K/UL
EOSINOPHIL NFR BLD AUTO: 1 %
FOLATE SERPL-MCNC: >20 NG/ML
GLUCOSE QUALITATIVE U: NEGATIVE
GLUCOSE SERPL-MCNC: 93 MG/DL
HCT VFR BLD CALC: 40.2 %
HDLC SERPL-MCNC: 34 MG/DL
HGB BLD-MCNC: 12.4 G/DL
KETONES URINE: NEGATIVE
LDLC SERPL CALC-MCNC: NORMAL MG/DL
LEUKOCYTE ESTERASE URINE: NEGATIVE
LYMPHOCYTES # BLD AUTO: 155.57 K/UL
LYMPHOCYTES NFR BLD AUTO: 87 %
MAN DIFF?: NORMAL
MCHC RBC-ENTMCNC: 28.5 PG
MCHC RBC-ENTMCNC: 30.8 GM/DL
MCV RBC AUTO: 92.4 FL
MICROALBUMIN 24H UR DL<=1MG/L-MCNC: <1.2 MG/DL
MICROALBUMIN/CREAT 24H UR-RTO: NORMAL MG/G
MONOCYTES # BLD AUTO: 14.31 K/UL
MONOCYTES NFR BLD AUTO: 8 %
NEUTROPHILS # BLD AUTO: 7.15 K/UL
NEUTROPHILS NFR BLD AUTO: 4 %
NITRITE URINE: NEGATIVE
PH URINE: 6.5
PLATELET # BLD AUTO: 130 K/UL
POTASSIUM SERPL-SCNC: 4.8 MMOL/L
PROT SERPL-MCNC: 6.6 G/DL
PROTEIN URINE: NEGATIVE
PSA SERPL-MCNC: 0.6 NG/ML
RBC # BLD: 4.35 M/UL
RBC # FLD: 14.2 %
SODIUM SERPL-SCNC: 138 MMOL/L
SPECIFIC GRAVITY URINE: 1.03
TRIGL SERPL-MCNC: 442 MG/DL
TSH SERPL-ACNC: 2.22 UIU/ML
UROBILINOGEN URINE: NORMAL
VIT B12 SERPL-MCNC: 683 PG/ML
WBC # FLD AUTO: 178.82 K/UL

## 2019-10-07 ENCOUNTER — RX RENEWAL (OUTPATIENT)
Age: 49
End: 2019-10-07

## 2019-10-09 ENCOUNTER — MEDICATION RENEWAL (OUTPATIENT)
Age: 49
End: 2019-10-09

## 2019-11-11 ENCOUNTER — MEDICATION RENEWAL (OUTPATIENT)
Age: 49
End: 2019-11-11

## 2019-12-18 ENCOUNTER — MEDICATION RENEWAL (OUTPATIENT)
Age: 49
End: 2019-12-18

## 2020-01-17 ENCOUNTER — RX RENEWAL (OUTPATIENT)
Age: 50
End: 2020-01-17

## 2020-01-21 ENCOUNTER — APPOINTMENT (OUTPATIENT)
Dept: HEMATOLOGY ONCOLOGY | Facility: CLINIC | Age: 50
End: 2020-01-21
Payer: COMMERCIAL

## 2020-01-21 VITALS
WEIGHT: 182 LBS | BODY MASS INDEX: 24.68 KG/M2 | SYSTOLIC BLOOD PRESSURE: 110 MMHG | DIASTOLIC BLOOD PRESSURE: 70 MMHG | OXYGEN SATURATION: 97 % | HEART RATE: 73 BPM

## 2020-01-21 PROCEDURE — 85025 COMPLETE CBC W/AUTO DIFF WBC: CPT

## 2020-01-21 PROCEDURE — 99215 OFFICE O/P EST HI 40 MIN: CPT

## 2020-01-21 NOTE — RESULTS/DATA
[FreeTextEntry1] : CBC done 20\par WBC:234\par A\par ANC: 11.51\par Hgb: 12.2\par Hct: 37.8\par Plt: 109

## 2020-01-21 NOTE — ASSESSMENT
[FreeTextEntry1] : Mr. Hewitt is a 49 year old male CLL (unmutated IGHV), treatment naive, who presents for follow-up.\par \par Plan:\par 1)CLL: continue to monitor, he has leukocytosis and palpable lymphadenopathy but this is not symptomatic. Will continue to observe closely, no need for active therapy at this time.\par 2) Diarrhea - pt was given rx for stool culture, c diff,  ova and parasites. We will try and schedule him for a colonscopy asap with Dr. Santos\par 2) Hypogammaglobulinemia: home IVIG\par 3)continue routine health maintenance and age appropriate screening as indicated\par 4)RTO in 4 weeks, sooner PRN\par \par PMD:\par Melissa Evans

## 2020-01-21 NOTE — PHYSICAL EXAM
[Fully active, able to carry on all pre-disease performance without restriction] : Status 0 - Fully active, able to carry on all pre-disease performance without restriction [Normal] : grossly intact [de-identified] : EOMI, anicteric sclera, conjunctiva normal [de-identified] : some palpable LN in the abdomen, do not cause symptoms to patient [de-identified] : KONRAD [de-identified] : no c/c/e [de-identified] : B/l 1 cm cervical and axillary lymph nodes, Scattered 1 cm lymph nodes in the L and R inguinal area-all stable

## 2020-01-21 NOTE — HISTORY OF PRESENT ILLNESS
[Disease:__________________________] : Disease: [unfilled] [Treatment Protocol] : Treatment Protocol [de-identified] : Mr. Hewitt is a a 49 year old male with CLL (dx 2006), treatment naive, who presents for routine follow-up. History notable for [facility] admission for management of PNA and sepsis for 4 days in April 2019. He gets home infusions every 4 weeks (he has had issues with scheduling) without complications.\par \par Pt has been experiencing diarrhea (4-5 a day) over the last 3 weeks.  He has not followed up with a GI. Denies fevers or night sweats. Appetite is ok. Weight stable. No chest pain or shortness of breath. [FreeTextEntry1] : Untreated [de-identified] : CLL Markers:\par normal FISH\par CD38+\par unmutated.

## 2020-01-21 NOTE — REVIEW OF SYSTEMS
[Negative] : Allergic/Immunologic [Fever] : no fever [Chills] : no chills [Night Sweats] : no night sweats [Recent Change In Weight] : ~T no recent weight change [Diarrhea] : diarrhea [Easy Bleeding] : no tendency for easy bleeding [Easy Bruising] : no tendency for easy bruising

## 2020-01-23 ENCOUNTER — LABORATORY RESULT (OUTPATIENT)
Age: 50
End: 2020-01-23

## 2020-01-23 LAB
ALBUMIN SERPL ELPH-MCNC: 4.8 G/DL
ALP BLD-CCNC: 81 U/L
ALT SERPL-CCNC: 31 U/L
ANION GAP SERPL CALC-SCNC: 12 MMOL/L
AST SERPL-CCNC: 33 U/L
BILIRUB SERPL-MCNC: 0.4 MG/DL
BUN SERPL-MCNC: 23 MG/DL
CALCIUM SERPL-MCNC: 9.6 MG/DL
CHLORIDE SERPL-SCNC: 102 MMOL/L
CO2 SERPL-SCNC: 23 MMOL/L
CREAT SERPL-MCNC: 1.01 MG/DL
DEPRECATED KAPPA LC FREE/LAMBDA SER: 20.22 RATIO
GLUCOSE SERPL-MCNC: 87 MG/DL
IGA SER QL IEP: 8 MG/DL
IGG SER QL IEP: 676 MG/DL
IGM SER QL IEP: <10 MG/DL
KAPPA LC CSF-MCNC: 0.63 MG/DL
KAPPA LC SERPL-MCNC: 12.74 MG/DL
LDH SERPL-CCNC: 174 U/L
MAGNESIUM SERPL-MCNC: 1.6 MG/DL
PHOSPHATE SERPL-MCNC: 4.1 MG/DL
POTASSIUM SERPL-SCNC: 4.4 MMOL/L
PROT SERPL-MCNC: 6.2 G/DL
SODIUM SERPL-SCNC: 138 MMOL/L
URATE SERPL-MCNC: 5.3 MG/DL

## 2020-01-24 ENCOUNTER — APPOINTMENT (OUTPATIENT)
Dept: GASTROENTEROLOGY | Facility: CLINIC | Age: 50
End: 2020-01-24
Payer: COMMERCIAL

## 2020-01-24 VITALS
DIASTOLIC BLOOD PRESSURE: 88 MMHG | TEMPERATURE: 97.8 F | HEIGHT: 72 IN | SYSTOLIC BLOOD PRESSURE: 145 MMHG | BODY MASS INDEX: 23.7 KG/M2 | WEIGHT: 175 LBS | HEART RATE: 77 BPM | OXYGEN SATURATION: 99 % | RESPIRATION RATE: 17 BRPM

## 2020-01-24 DIAGNOSIS — K63.5 POLYP OF COLON: ICD-10-CM

## 2020-01-24 LAB
IGA SER QL IEP: 10 MG/DL
IGG SER QL IEP: 802 MG/DL
IGM SER QL IEP: 11 MG/DL
TSH SERPL-ACNC: 3.42 UIU/ML

## 2020-01-24 PROCEDURE — 99204 OFFICE O/P NEW MOD 45 MIN: CPT

## 2020-01-24 NOTE — PHYSICAL EXAM
[General Appearance - In No Acute Distress] : in no acute distress [General Appearance - Alert] : alert [Sclera] : the sclera and conjunctiva were normal [General Appearance - Well-Appearing] : healthy appearing [General Appearance - Well Nourished] : well nourished [PERRL With Normal Accommodation] : pupils were equal in size, round, and reactive to light [Extraocular Movements] : extraocular movements were intact [Outer Ear] : the ears and nose were normal in appearance [Examination Of The Oral Cavity] : the lips and gums were normal [Oropharynx] : the oropharynx was normal [Neck Appearance] : the appearance of the neck was normal [Neck Cervical Mass (___cm)] : no neck mass was observed [Respiration, Rhythm And Depth] : normal respiratory rhythm and effort [Exaggerated Use Of Accessory Muscles For Inspiration] : no accessory muscle use [Auscultation Breath Sounds / Voice Sounds] : lungs were clear to auscultation bilaterally [Heart Rate And Rhythm] : heart rate was normal and rhythm regular [Heart Sounds] : normal S1 and S2 [Bowel Sounds] : normal bowel sounds [Edema] : there was no peripheral edema [Murmurs] : no murmurs [Abdomen Tenderness] : non-tender [Abdomen Soft] : soft [Cervical Lymph Nodes Enlarged Anterior Bilaterally] : anterior cervical [Cervical Lymph Nodes Enlarged Posterior Bilaterally] : posterior cervical [No CVA Tenderness] : no ~M costovertebral angle tenderness [No Spinal Tenderness] : no spinal tenderness [Supraclavicular Lymph Nodes Enlarged Bilaterally] : supraclavicular [Involuntary Movements] : no involuntary movements were seen [Abnormal Walk] : normal gait [No Focal Deficits] : no focal deficits [Skin Color & Pigmentation] : normal skin color and pigmentation [] : no rash [Affect] : the affect was normal [FreeTextEntry1] : aox3 [Impaired Insight] : insight and judgment were intact

## 2020-01-24 NOTE — ASSESSMENT
[FreeTextEntry1] : 48 yo M pmh CLL off therapy, HTN, HL, h/o colon polyps who presents as referral by KATHRINE GASTON for subacute diarrhea.  Already ruled out for infection.  Biggest concern is the with increase in WBC in setting of CLL, there may be GI involvement that is worsening these symptoms and/or that diarrhea may be barrier to potential treatments.  Will need endoscopic evaluation.  DDx also includes: Food intolerances, IBS, IBD/MIcroscopic colitis\par \par Impression:\par 1) Subacute Diarrhea\par 2) CLL with increasing WBC\par 3) HTN/HL\par 4) Anxiety\par 5) Fam h/o Colon cancer\par 6) Personal history of polyps\par \par Plan:\par 1) EGD/Colon with random bx\par 2) Labs today including thyroid, celiac\par 3) Imodium PRN is okay\par 4) Stop chewing gum and energy drinks\par 5) Rest of workup pending above\par 6) All discussed at length.  All questions answered.  Plan agreed upon.\par 7) RV pending above\par

## 2020-01-24 NOTE — REVIEW OF SYSTEMS
[Fever] : no fever [Chills] : no chills [Feeling Poorly] : feeling poorly [Feeling Tired] : not feeling tired [Recent Weight Gain (___ Lbs)] : no recent weight gain [Recent Weight Loss (___ Lbs)] : no recent weight loss [Heart Rate Is Fast] : the heart rate was not fast [Palpitations] : no palpitations [Chest Pain] : no chest pain [Shortness Of Breath] : no shortness of breath [Wheezing] : no wheezing [SOB on Exertion] : no shortness of breath during exertion [As Noted in HPI] : as noted in HPI [Cough] : no cough [Joint Pain] : no joint pain [Arthralgias] : no arthralgias [Skin Lesions] : no skin lesions [Skin Wound] : no skin wound [Confused] : no confusion [Convulsions] : no convulsions [Dizziness] : no dizziness [Limb Weakness] : no limb weakness [Anxiety] : anxiety [Difficulty Walking] : no difficulty walking [Depression] : no depression [Negative] : Heme/Lymph [de-identified] : (controlled)

## 2020-01-28 LAB
TTG IGA SER IA-ACNC: <1.2 U/ML
TTG IGA SER-ACNC: NEGATIVE
TTG IGG SER IA-ACNC: <1.2 U/ML
TTG IGG SER IA-ACNC: NEGATIVE

## 2020-02-05 ENCOUNTER — RESULT REVIEW (OUTPATIENT)
Age: 50
End: 2020-02-05

## 2020-02-05 ENCOUNTER — OUTPATIENT (OUTPATIENT)
Dept: OUTPATIENT SERVICES | Facility: HOSPITAL | Age: 50
LOS: 1 days | End: 2020-02-05
Payer: COMMERCIAL

## 2020-02-05 ENCOUNTER — APPOINTMENT (OUTPATIENT)
Dept: GASTROENTEROLOGY | Facility: HOSPITAL | Age: 50
End: 2020-02-05

## 2020-02-05 DIAGNOSIS — R19.7 DIARRHEA, UNSPECIFIED: ICD-10-CM

## 2020-02-05 PROCEDURE — 43239 EGD BIOPSY SINGLE/MULTIPLE: CPT

## 2020-02-05 PROCEDURE — 88312 SPECIAL STAINS GROUP 1: CPT

## 2020-02-05 PROCEDURE — 45380 COLONOSCOPY AND BIOPSY: CPT

## 2020-02-05 PROCEDURE — 88305 TISSUE EXAM BY PATHOLOGIST: CPT | Mod: 26

## 2020-02-05 PROCEDURE — 88341 IMHCHEM/IMCYTCHM EA ADD ANTB: CPT | Mod: 26

## 2020-02-05 PROCEDURE — 88312 SPECIAL STAINS GROUP 1: CPT | Mod: 26

## 2020-02-05 PROCEDURE — 88341 IMHCHEM/IMCYTCHM EA ADD ANTB: CPT

## 2020-02-05 PROCEDURE — 88342 IMHCHEM/IMCYTCHM 1ST ANTB: CPT | Mod: 26,59

## 2020-02-05 PROCEDURE — 88305 TISSUE EXAM BY PATHOLOGIST: CPT

## 2020-02-19 ENCOUNTER — APPOINTMENT (OUTPATIENT)
Dept: HEMATOLOGY ONCOLOGY | Facility: CLINIC | Age: 50
End: 2020-02-19
Payer: COMMERCIAL

## 2020-02-19 VITALS — DIASTOLIC BLOOD PRESSURE: 74 MMHG | SYSTOLIC BLOOD PRESSURE: 118 MMHG | HEART RATE: 76 BPM | OXYGEN SATURATION: 98 %

## 2020-02-19 PROCEDURE — 99215 OFFICE O/P EST HI 40 MIN: CPT

## 2020-02-19 PROCEDURE — 85025 COMPLETE CBC W/AUTO DIFF WBC: CPT

## 2020-02-20 LAB
ALBUMIN SERPL ELPH-MCNC: 4.9 G/DL
ALP BLD-CCNC: 97 U/L
ALT SERPL-CCNC: 27 U/L
ANION GAP SERPL CALC-SCNC: 13 MMOL/L
AST SERPL-CCNC: 34 U/L
BILIRUB SERPL-MCNC: 0.3 MG/DL
BUN SERPL-MCNC: 19 MG/DL
CALCIUM SERPL-MCNC: 9.5 MG/DL
CHLORIDE SERPL-SCNC: 101 MMOL/L
CO2 SERPL-SCNC: 24 MMOL/L
CREAT SERPL-MCNC: 1.07 MG/DL
FERRITIN SERPL-MCNC: 29 NG/ML
FOLATE SERPL-MCNC: 12 NG/ML
GLUCOSE SERPL-MCNC: 67 MG/DL
IRON SATN MFR SERPL: 21 %
IRON SERPL-MCNC: 75 UG/DL
LDH SERPL-CCNC: 186 U/L
MAGNESIUM SERPL-MCNC: 1.9 MG/DL
PHOSPHATE SERPL-MCNC: 3.4 MG/DL
POTASSIUM SERPL-SCNC: 4.5 MMOL/L
PROT SERPL-MCNC: 6.6 G/DL
SODIUM SERPL-SCNC: 138 MMOL/L
TIBC SERPL-MCNC: 363 UG/DL
UIBC SERPL-MCNC: 289 UG/DL
URATE SERPL-MCNC: 5.4 MG/DL
VIT B12 SERPL-MCNC: 657 PG/ML

## 2020-02-20 NOTE — REVIEW OF SYSTEMS
[Diarrhea] : diarrhea [Negative] : Allergic/Immunologic [Fever] : no fever [Chills] : no chills [Night Sweats] : no night sweats [Easy Bruising] : no tendency for easy bruising [Recent Change In Weight] : ~T no recent weight change [Easy Bleeding] : no tendency for easy bleeding [FreeTextEntry7] : d

## 2020-02-20 NOTE — PHYSICAL EXAM
[Fully active, able to carry on all pre-disease performance without restriction] : Status 0 - Fully active, able to carry on all pre-disease performance without restriction [Normal] : affect appropriate [de-identified] : EOMI, anicteric sclera, conjunctiva normal [de-identified] : KONRAD [de-identified] : no c/c/e [de-identified] : some palpable LN in the abdomen, do not cause symptoms to patient. splenomegaly 4cm BCM [de-identified] : B/l 1 cm cervical and axillary lymph nodes, Scattered 1 cm lymph nodes in the L and R inguinal area-all stable

## 2020-02-20 NOTE — HISTORY OF PRESENT ILLNESS
[Disease:__________________________] : Disease: [unfilled] [Treatment Protocol] : Treatment Protocol [de-identified] : Mr. Hewitt is a a 49 year old male with CLL (dx 2006), treatment naive, who presents for routine follow-up. History notable for [facility] admission for management of PNA and sepsis for 4 days in April 2019. He gets home infusions every 4 weeks (he has had issues with scheduling) without complications.\par \par Pt followed up with Dr. Santos for diarrhea, colonoscopy and path only showed mild colitis, possibly a post infectious process. Denies fevers or night sweats. Appetite is ok. Weight stable. No chest pain or shortness of breath. [de-identified] : CLL Markers:\par normal FISH\par CD38+\par unmutated.  [FreeTextEntry1] : Untreated

## 2020-02-20 NOTE — ASSESSMENT
[FreeTextEntry1] : Mr. Hewitt is a 49 year old male CLL (unmutated IGHV), treatment naive, who presents for follow-up after recent bout of diarrhea of unknown etiology.\par \par Plan:\par 1)CLL: continue to monitor, he has leukocytosis and palpable lymphadenopathy/splenomegaly but this is not symptomatic. Will continue to observe closely, no need for active therapy at this time.\par 2) Diarrhea - Improved, now twice a day per pt, pt to f/u with Dr. Santos as needed.\par 2) Hypogammaglobulinemia: home IVIG\par 3)continue routine health maintenance and age appropriate screening as indicated\par 4)RTO in 2-3 months, sooner PRN\par \par PMD:\par Melissa Evans

## 2020-02-20 NOTE — RESULTS/DATA
[FreeTextEntry1] : CBC done 20\par WBC:232.7\par A.78\par ANC: 12.57\par Hgb: 12.3\par Hct: 38.0\par Plt: 123

## 2020-03-09 ENCOUNTER — RX RENEWAL (OUTPATIENT)
Age: 50
End: 2020-03-09

## 2020-03-30 DIAGNOSIS — R05 COUGH: ICD-10-CM

## 2020-03-30 DIAGNOSIS — R09.82 POSTNASAL DRIP: ICD-10-CM

## 2020-03-31 ENCOUNTER — APPOINTMENT (OUTPATIENT)
Dept: INTERNAL MEDICINE | Facility: CLINIC | Age: 50
End: 2020-03-31

## 2020-05-15 ENCOUNTER — LABORATORY RESULT (OUTPATIENT)
Age: 50
End: 2020-05-15

## 2020-05-17 LAB
ALBUMIN SERPL ELPH-MCNC: 4.8 G/DL
ALP BLD-CCNC: 101 U/L
ALT SERPL-CCNC: 29 U/L
ANION GAP SERPL CALC-SCNC: 15 MMOL/L
AST SERPL-CCNC: 36 U/L
BASOPHILS # BLD AUTO: 0 K/UL
BASOPHILS NFR BLD AUTO: 0 %
BILIRUB SERPL-MCNC: 0.3 MG/DL
BUN SERPL-MCNC: 19 MG/DL
CALCIUM SERPL-MCNC: 9.5 MG/DL
CHLORIDE SERPL-SCNC: 101 MMOL/L
CO2 SERPL-SCNC: 24 MMOL/L
CREAT SERPL-MCNC: 1.08 MG/DL
EOSINOPHIL # BLD AUTO: 0 K/UL
EOSINOPHIL NFR BLD AUTO: 0 %
GLUCOSE SERPL-MCNC: 62 MG/DL
HCT VFR BLD CALC: 37.1 %
HGB BLD-MCNC: 10.4 G/DL
LDH SERPL-CCNC: 227 U/L
LYMPHOCYTES # BLD AUTO: 231.94 K/UL
LYMPHOCYTES NFR BLD AUTO: 92 %
MAGNESIUM SERPL-MCNC: 1.8 MG/DL
MAN DIFF?: NORMAL
MCHC RBC-ENTMCNC: 25.7 PG
MCHC RBC-ENTMCNC: 28 GM/DL
MCV RBC AUTO: 91.6 FL
MONOCYTES # BLD AUTO: 2.52 K/UL
MONOCYTES NFR BLD AUTO: 1 %
NEUTROPHILS # BLD AUTO: 5.04 K/UL
NEUTROPHILS NFR BLD AUTO: 2 %
PHOSPHATE SERPL-MCNC: 4.9 MG/DL
PLATELET # BLD AUTO: 66 K/UL
POTASSIUM SERPL-SCNC: 4.8 MMOL/L
PROT SERPL-MCNC: 6.4 G/DL
RBC # BLD: 4.05 M/UL
RBC # FLD: 14.1 %
SARS-COV-2 IGG SERPL IA-ACNC: <0.1 INDEX
SARS-COV-2 IGG SERPL QL IA: NEGATIVE
SODIUM SERPL-SCNC: 140 MMOL/L
URATE SERPL-MCNC: 5.7 MG/DL
WBC # FLD AUTO: 252.11 K/UL

## 2020-05-18 ENCOUNTER — LABORATORY RESULT (OUTPATIENT)
Age: 50
End: 2020-05-18

## 2020-05-18 ENCOUNTER — APPOINTMENT (OUTPATIENT)
Dept: HEMATOLOGY ONCOLOGY | Facility: CLINIC | Age: 50
End: 2020-05-18
Payer: COMMERCIAL

## 2020-05-18 PROCEDURE — 99215 OFFICE O/P EST HI 40 MIN: CPT | Mod: 95

## 2020-05-19 ENCOUNTER — APPOINTMENT (OUTPATIENT)
Dept: HEMATOLOGY ONCOLOGY | Facility: CLINIC | Age: 50
End: 2020-05-19
Payer: COMMERCIAL

## 2020-05-19 ENCOUNTER — RX RENEWAL (OUTPATIENT)
Age: 50
End: 2020-05-19

## 2020-05-19 LAB
ALBUMIN MFR SERPL ELPH: 68.1 %
ALBUMIN SERPL ELPH-MCNC: 4.6 G/DL
ALBUMIN SERPL-MCNC: 4.4 G/DL
ALBUMIN/GLOB SERPL: 2.1 RATIO
ALP BLD-CCNC: 104 U/L
ALPHA1 GLOB MFR SERPL ELPH: 3.2 %
ALPHA1 GLOB SERPL ELPH-MCNC: 0.2 G/DL
ALPHA2 GLOB MFR SERPL ELPH: 8.3 %
ALPHA2 GLOB SERPL ELPH-MCNC: 0.5 G/DL
ALT SERPL-CCNC: 29 U/L
ANION GAP SERPL CALC-SCNC: 13 MMOL/L
AST SERPL-CCNC: 36 U/L
B-GLOBULIN MFR SERPL ELPH: 8.8 %
B-GLOBULIN SERPL ELPH-MCNC: 0.6 G/DL
BASOPHILS # BLD AUTO: 0 K/UL
BASOPHILS NFR BLD AUTO: 0 %
BILIRUB SERPL-MCNC: 0.4 MG/DL
BUN SERPL-MCNC: 19 MG/DL
CALCIUM SERPL-MCNC: 9.4 MG/DL
CHLORIDE SERPL-SCNC: 103 MMOL/L
CO2 SERPL-SCNC: 24 MMOL/L
CREAT SERPL-MCNC: 1.17 MG/DL
DEPRECATED KAPPA LC FREE/LAMBDA SER: 51.19 RATIO
DEPRECATED KAPPA LC FREE/LAMBDA SER: 51.19 RATIO
DIRECT COOMBS: NORMAL
EOSINOPHIL # BLD AUTO: 0 K/UL
EOSINOPHIL NFR BLD AUTO: 0 %
FERRITIN SERPL-MCNC: 28 NG/ML
FOLATE SERPL-MCNC: 16.9 NG/ML
GAMMA GLOB FLD ELPH-MCNC: 0.8 G/DL
GAMMA GLOB MFR SERPL ELPH: 11.6 %
GLUCOSE SERPL-MCNC: 79 MG/DL
HAPTOGLOB SERPL-MCNC: 27 MG/DL
HCT VFR BLD CALC: 38.8 %
HGB BLD-MCNC: 10.7 G/DL
IGA SER QL IEP: 7 MG/DL
IGG SER QL IEP: 724 MG/DL
IGM SER QL IEP: 14 MG/DL
INTERPRETATION SERPL IEP-IMP: NORMAL
IRON SATN MFR SERPL: 29 %
IRON SERPL-MCNC: 105 UG/DL
KAPPA LC CSF-MCNC: 0.21 MG/DL
KAPPA LC CSF-MCNC: 0.21 MG/DL
KAPPA LC SERPL-MCNC: 10.75 MG/DL
KAPPA LC SERPL-MCNC: 10.75 MG/DL
LDH SERPL-CCNC: 216 U/L
LYMPHOCYTES # BLD AUTO: 245.59 K/UL
LYMPHOCYTES NFR BLD AUTO: 96 %
M PROTEIN SPEC IFE-MCNC: NORMAL
MAGNESIUM SERPL-MCNC: 1.9 MG/DL
MAN DIFF?: NORMAL
MCHC RBC-ENTMCNC: 25.7 PG
MCHC RBC-ENTMCNC: 27.6 GM/DL
MCV RBC AUTO: 93 FL
MONOCYTES # BLD AUTO: 5.12 K/UL
MONOCYTES NFR BLD AUTO: 2 %
NEUTROPHILS # BLD AUTO: 5.12 K/UL
NEUTROPHILS NFR BLD AUTO: 2 %
PHOSPHATE SERPL-MCNC: 4.6 MG/DL
PLATELET # BLD AUTO: 69 K/UL
POTASSIUM SERPL-SCNC: 4.7 MMOL/L
PROT SERPL-MCNC: 6.5 G/DL
RBC # BLD: 4.17 M/UL
RBC # BLD: 4.17 M/UL
RBC # FLD: 14 %
RETICS # AUTO: 0.8 %
RETICS AGGREG/RBC NFR: 35 K/UL
SODIUM SERPL-SCNC: 140 MMOL/L
TIBC SERPL-MCNC: 358 UG/DL
UIBC SERPL-MCNC: 253 UG/DL
URATE SERPL-MCNC: 5.4 MG/DL
VIT B12 SERPL-MCNC: 683 PG/ML
WBC # FLD AUTO: 255.82 K/UL

## 2020-05-19 PROCEDURE — 99214 OFFICE O/P EST MOD 30 MIN: CPT | Mod: 95

## 2020-05-20 ENCOUNTER — OUTPATIENT (OUTPATIENT)
Dept: OUTPATIENT SERVICES | Facility: HOSPITAL | Age: 50
LOS: 1 days | Discharge: HOME | End: 2020-05-20
Payer: COMMERCIAL

## 2020-05-20 DIAGNOSIS — D64.9 ANEMIA, UNSPECIFIED: ICD-10-CM

## 2020-05-22 ENCOUNTER — RESULT REVIEW (OUTPATIENT)
Age: 50
End: 2020-05-22

## 2020-05-22 ENCOUNTER — APPOINTMENT (OUTPATIENT)
Dept: INFUSION THERAPY | Facility: HOSPITAL | Age: 50
End: 2020-05-22

## 2020-05-22 ENCOUNTER — APPOINTMENT (OUTPATIENT)
Dept: HEMATOLOGY ONCOLOGY | Facility: CLINIC | Age: 50
End: 2020-05-22
Payer: COMMERCIAL

## 2020-05-22 LAB
BASOPHILS # BLD AUTO: 0 K/UL — SIGNIFICANT CHANGE UP (ref 0–0.2)
BASOPHILS NFR BLD AUTO: 0 % — SIGNIFICANT CHANGE UP (ref 0–2)
EOSINOPHIL # BLD AUTO: 0 K/UL — SIGNIFICANT CHANGE UP (ref 0–0.5)
EOSINOPHIL NFR BLD AUTO: 0 % — SIGNIFICANT CHANGE UP (ref 0–6)
HCT VFR BLD CALC: 36.8 % — LOW (ref 39–50)
HGB BLD-MCNC: 10.3 G/DL — LOW (ref 13–17)
LYMPHOCYTES # BLD AUTO: 267.15 K/UL — HIGH (ref 1–3.3)
LYMPHOCYTES # BLD AUTO: 83 % — HIGH (ref 13–44)
LYMPHOCYTES # SPEC AUTO: 8 % — HIGH (ref 0–0)
MCHC RBC-ENTMCNC: 25.8 PG — LOW (ref 27–34)
MCHC RBC-ENTMCNC: 28 GM/DL — LOW (ref 32–36)
MCV RBC AUTO: 92 FL — SIGNIFICANT CHANGE UP (ref 80–100)
MONOCYTES # BLD AUTO: 16.09 K/UL — HIGH (ref 0–0.9)
MONOCYTES NFR BLD AUTO: 5 % — SIGNIFICANT CHANGE UP (ref 2–14)
NEUTROPHILS # BLD AUTO: 12.87 K/UL — HIGH (ref 1.8–7.4)
NEUTROPHILS NFR BLD AUTO: 3 % — LOW (ref 43–77)
NEUTS BAND # BLD: 1 % — SIGNIFICANT CHANGE UP (ref 0–8)
NRBC # BLD: 0 /100 — SIGNIFICANT CHANGE UP (ref 0–0)
NRBC # BLD: SIGNIFICANT CHANGE UP /100 WBCS (ref 0–0)
PLAT MORPH BLD: NORMAL — SIGNIFICANT CHANGE UP
PLATELET # BLD AUTO: 80 K/UL — LOW (ref 150–400)
RBC # BLD: 4 M/UL — LOW (ref 4.2–5.8)
RBC # FLD: 14 % — SIGNIFICANT CHANGE UP (ref 10.3–14.5)
RBC BLD AUTO: SIGNIFICANT CHANGE UP
SMUDGE CELLS # BLD: PRESENT — SIGNIFICANT CHANGE UP
WBC # BLD: 321.87 K/UL — CRITICAL HIGH (ref 3.8–10.5)
WBC # FLD AUTO: 321.87 K/UL — CRITICAL HIGH (ref 3.8–10.5)

## 2020-05-22 PROCEDURE — 99215 OFFICE O/P EST HI 40 MIN: CPT | Mod: 95

## 2020-05-25 NOTE — HISTORY OF PRESENT ILLNESS
[Disease:__________________________] : Disease: [unfilled] [Home] : at home, [unfilled] , at the time of the visit. [Medical Office: ([facility])___] : at the medical office located in  [Spouse] : spouse [Patient] : the patient [Self] : self [de-identified] : Mr. Hewitt is a a 49 year old male with CLL (dx 2006), treatment naive, who presents for routine follow-up. History notable for [facility] admission for management of PNA and sepsis for 4 days in April 2019. He gets home infusions every 4 weeks (he has had issues with scheduling) without complications. Pt had met with Dr. 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 Although he doesn't have symptoms, I am concerned that part of the worsening anemia and thrombocytopenia is related more to CLL progression, but at this time we are trying to optimize his current clinical presentation by doing a trial of steroids and IV iron as he was not interested in discussing treatment for the CLL as his numbers had started to change. He does report noticing more energy after the steroids were started and he has not noted any toxicities from it's use yet. On ROS, he denies BRBPR, melena, diarrhea, weight loss. He denied any fevers. He has been under quarantine but has started to relax the measures. [de-identified] : CLL Markers:\par normal FISH\par CD38+\par unmutated.  [FreeTextEntry4] : wife

## 2020-05-25 NOTE — ASSESSMENT
[FreeTextEntry1] : Mr. Hewitt is a 49 year old male CLL (unmutated IGHV), treatment naive, who presents for follow-up after receiving IV iron earlier today. He tolerated it well and is currently on IV steroids which he is also tolerating well.\par \par Plan:\par 1)CLL: continue to monitor, he has leukocytosis and palpable lymphadenopathy/splenomegaly now with new anemia and thrombocytopenia. We are doing a trial of steroids and IV iron to see if this improves his numbers as he remains asymptomatic.\par 2)Hypogammaglobulinemia: continue home IVIG\par 3)continue routine health maintenance and age appropriate screening as indicated\par 4)RTO in 1 week to see how he is responding to therapy. If no response, may need to start CLL directed therapy.\par \par PMD:\par Melissa Evans

## 2020-05-25 NOTE — PHYSICAL EXAM
[Restricted in physically strenuous activity but ambulatory and able to carry out work of a light or sedentary nature] : Status 1- Restricted in physically strenuous activity but ambulatory and able to carry out work of a light or sedentary nature, e.g., light house work, office work [Normal] : affect appropriate [de-identified] : no distress, speakingin full sentences [de-identified] : aox3

## 2020-05-26 DIAGNOSIS — D50.9 IRON DEFICIENCY ANEMIA, UNSPECIFIED: ICD-10-CM

## 2020-05-26 NOTE — PHYSICAL EXAM
[Restricted in physically strenuous activity but ambulatory and able to carry out work of a light or sedentary nature] : Status 1- Restricted in physically strenuous activity but ambulatory and able to carry out work of a light or sedentary nature, e.g., light house work, office work [Normal] : PERRL, EOMI, no conjunctival infection, anicteric [de-identified] : aox3

## 2020-05-26 NOTE — HISTORY OF PRESENT ILLNESS
[Home] : at home, [unfilled] , at the time of the visit. [Medical Office: ([facility])___] : at the medical office located in  [Patient] : the patient [Self] : self [Disease:__________________________] : Disease: [unfilled] [Spouse] : spouse [FreeTextEntry4] : wife [de-identified] : Mr. Hewitt is a a 49 year old male with CLL (dx 2006), treatment naive, who presents for routine follow-up. History notable for [facility] admission for management of PNA and sepsis for 4 days in April 2019. He gets home infusions every 4 weeks (he has had issues with scheduling) without complications. Pt had met with Dr. Santos for diarrhea, colonoscopy and path only showed mild colitis, possibly a post infectious process. He also resports a recent cold-like infection but this resolved. He was noted to have a more pronounced anemia and thrombocytopenia on recent labs from May 15, 2020. He reports no new symptoms nor notable change in the size of his LN or spleen that he has noted. On ROS, he denies BRBPR, melena, diarrhea, weight loss. He denied any fevers. He has not had any COVID contacts. He has been under quarantine but has started to relax the measures, he just spent the weekend with some friends while social distancing. [de-identified] : CLL Markers:\par normal FISH\par CD38+\par unmutated.

## 2020-05-26 NOTE — HISTORY OF PRESENT ILLNESS
[Disease:__________________________] : Disease: [unfilled] [de-identified] : Mr. Hewitt is a a 49 year old male with CLL (dx 2006), treatment naive, who presents for routine follow-up. History notable for [facility] admission for management of PNA and sepsis for 4 days in April 2019. He gets home infusions every 4 weeks (he has had issues with scheduling) without complications. Pt had met with Dr. Santos for diarrhea, colonoscopy and path only showed mild colitis, possibly a post infectious process. He also resports a recent cold-like infection but this resolved.\par \par He had recent testing and it is notable for worsening anemia and thrombocytopenia, though he reports that he remains asymptomatic. He reports on notable change in the size of his LN or spleen that he has noted. On ROS, he denies BRBPR, melena, diarrhea, weight loss. He denied any fevers. He has not had any COVID contacts. He has been under quarantine but has started to relax the measures, he just spent the weekend with some friends while social distancing. [de-identified] : CLL Markers:\par normal FISH\par CD38+\par unmutated.  [Home] : at home, [unfilled] , at the time of the visit. [Medical Office: ([facility])___] : at the medical office located in  [Spouse] : spouse [Patient] : the patient [Self] : self [FreeTextEntry4] : wife

## 2020-05-26 NOTE — ASSESSMENT
[FreeTextEntry1] : Mr. Hewitt is a 49 year old male CLL (unmutated IGHV), treatment naive, who presents for follow-up and was found to have worsening anemia and thrombocytopenia in the setting of rising lymphocyte count. I am concerned that part of the worsening anemia and thrombocytopenia is related more to CLL progression, but at this time we are trying to optimize his current clinical presentation by doing a trial of steroids and IV iron as he was not interested in discussing treatment for the CLL as his numbers had started to change. He does report having a cold-like infection not long ago, so this could have triggered some autoimmune component. On ROS, he denies BRBPR, melena, diarrhea, weight loss. He denied any fevers. He has been under quarantine but has started to relax the measures.\par \par Plan:\par 1)CLL: continue to monitor, he has leukocytosis and palpable lymphadenopathy/splenomegaly now with new anemia and thrombocytopenia. We are doing a trial of steroids to see if this improves his numbers as he remains asymptomatic.\par 2)Hypogammaglobulinemia: continue home IVIG\par 3)continue routine health maintenance and age appropriate screening as indicated\par 4)RTO later in the week if he shows that he has low iron stores. \par \par Patient aware if no response,we may need to pursue a bone marrow biopsy and start CLL directed therapy.\par \par PMD:\par Melissa Evans

## 2020-05-26 NOTE — PHYSICAL EXAM
[Restricted in physically strenuous activity but ambulatory and able to carry out work of a light or sedentary nature] : Status 1- Restricted in physically strenuous activity but ambulatory and able to carry out work of a light or sedentary nature, e.g., light house work, office work [Normal] : affect appropriate [de-identified] : aox3

## 2020-05-26 NOTE — ASSESSMENT
[FreeTextEntry1] : Mr. Hewitt is a 49 year old male CLL (unmutated IGHV), treatment naive, who presents for follow-up and was noted to have worsening anemia and thrombocytopenia. I have advised him to get lab testing this afternoon that includes Lupe testing and iron studies to see if we can optimized with a trial of steroids or IV iron. I expressed my concern that this is most likely progression of disease.\par \par Plan:\par 1)CLL: continue to monitor, he has leukocytosis and palpable lymphadenopathy/splenomegaly now with new anemia and thrombocytopenia. Further testing to be done today, patient not agreeable to bone marrow biopsy at this time until results of blood testing come back.\par 2)Hypogammaglobulinemia: continue home IVIG\par 3)continue routine health maintenance and age appropriate screening as indicated\par 4)RTO later in the week if he shows that he has low iron stores. \par \par Patient aware if no response,we may need to pursue a bone marrow biopsy and start CLL directed therapy.\par \par PMD:\par Melissa Evans

## 2020-05-29 ENCOUNTER — APPOINTMENT (OUTPATIENT)
Dept: HEMATOLOGY ONCOLOGY | Facility: CLINIC | Age: 50
End: 2020-05-29
Payer: COMMERCIAL

## 2020-05-29 PROCEDURE — 99442: CPT

## 2020-06-01 ENCOUNTER — LABORATORY RESULT (OUTPATIENT)
Age: 50
End: 2020-06-01

## 2020-06-01 ENCOUNTER — RESULT REVIEW (OUTPATIENT)
Age: 50
End: 2020-06-01

## 2020-06-01 ENCOUNTER — APPOINTMENT (OUTPATIENT)
Dept: INFUSION THERAPY | Facility: HOSPITAL | Age: 50
End: 2020-06-01

## 2020-06-01 ENCOUNTER — RX RENEWAL (OUTPATIENT)
Age: 50
End: 2020-06-01

## 2020-06-01 LAB
BASOPHILS # BLD AUTO: 0 K/UL — SIGNIFICANT CHANGE UP (ref 0–0.2)
BASOPHILS NFR BLD AUTO: 0 % — SIGNIFICANT CHANGE UP (ref 0–2)
EOSINOPHIL # BLD AUTO: 0 K/UL — SIGNIFICANT CHANGE UP (ref 0–0.5)
EOSINOPHIL NFR BLD AUTO: 0 % — SIGNIFICANT CHANGE UP (ref 0–6)
HCT VFR BLD CALC: 34.7 % — LOW (ref 39–50)
HGB BLD-MCNC: 9.7 G/DL — LOW (ref 13–17)
LYMPHOCYTES # BLD AUTO: 270.49 K/UL — HIGH (ref 1–3.3)
LYMPHOCYTES # BLD AUTO: 91 % — HIGH (ref 13–44)
LYMPHOCYTES # SPEC AUTO: 5 % — HIGH (ref 0–0)
MCHC RBC-ENTMCNC: 25.7 PG — LOW (ref 27–34)
MCHC RBC-ENTMCNC: 28 GM/DL — LOW (ref 32–36)
MCV RBC AUTO: 91.8 FL — SIGNIFICANT CHANGE UP (ref 80–100)
MONOCYTES # BLD AUTO: 5.94 K/UL — HIGH (ref 0–0.9)
MONOCYTES NFR BLD AUTO: 2 % — SIGNIFICANT CHANGE UP (ref 2–14)
NEUTROPHILS # BLD AUTO: 5.94 K/UL — SIGNIFICANT CHANGE UP (ref 1.8–7.4)
NEUTROPHILS NFR BLD AUTO: 2 % — LOW (ref 43–77)
NRBC # BLD: 0 /100 — SIGNIFICANT CHANGE UP (ref 0–0)
NRBC # BLD: SIGNIFICANT CHANGE UP /100 WBCS (ref 0–0)
PLAT MORPH BLD: NORMAL — SIGNIFICANT CHANGE UP
PLATELET # BLD AUTO: 72 K/UL — LOW (ref 150–400)
RBC # BLD: 3.78 M/UL — LOW (ref 4.2–5.8)
RBC # FLD: 14.3 % — SIGNIFICANT CHANGE UP (ref 10.3–14.5)
RBC BLD AUTO: SIGNIFICANT CHANGE UP
SMUDGE CELLS # BLD: PRESENT — SIGNIFICANT CHANGE UP
WBC # BLD: 297.24 K/UL — CRITICAL HIGH (ref 3.8–10.5)
WBC # FLD AUTO: 297.24 K/UL — CRITICAL HIGH (ref 3.8–10.5)

## 2020-06-03 ENCOUNTER — APPOINTMENT (OUTPATIENT)
Dept: INFUSION THERAPY | Facility: HOSPITAL | Age: 50
End: 2020-06-03

## 2020-06-05 ENCOUNTER — LABORATORY RESULT (OUTPATIENT)
Age: 50
End: 2020-06-05

## 2020-06-05 ENCOUNTER — RESULT REVIEW (OUTPATIENT)
Age: 50
End: 2020-06-05

## 2020-06-05 ENCOUNTER — APPOINTMENT (OUTPATIENT)
Dept: INFUSION THERAPY | Facility: HOSPITAL | Age: 50
End: 2020-06-05

## 2020-06-05 LAB
BASOPHILS # BLD AUTO: 0 K/UL — SIGNIFICANT CHANGE UP (ref 0–0.2)
BASOPHILS NFR BLD AUTO: 0 % — SIGNIFICANT CHANGE UP (ref 0–2)
EOSINOPHIL # BLD AUTO: 0 K/UL — SIGNIFICANT CHANGE UP (ref 0–0.5)
EOSINOPHIL NFR BLD AUTO: 0 % — SIGNIFICANT CHANGE UP (ref 0–6)
HCT VFR BLD CALC: 35.2 % — LOW (ref 39–50)
HGB BLD-MCNC: 9.8 G/DL — LOW (ref 13–17)
LYMPHOCYTES # BLD AUTO: 250.39 K/UL — HIGH (ref 1–3.3)
LYMPHOCYTES # BLD AUTO: 90 % — HIGH (ref 13–44)
LYMPHOCYTES # SPEC AUTO: 3 % — HIGH (ref 0–0)
MCHC RBC-ENTMCNC: 25.9 PG — LOW (ref 27–34)
MCHC RBC-ENTMCNC: 27.8 GM/DL — LOW (ref 32–36)
MCV RBC AUTO: 93.1 FL — SIGNIFICANT CHANGE UP (ref 80–100)
MONOCYTES # BLD AUTO: 11.13 K/UL — HIGH (ref 0–0.9)
MONOCYTES NFR BLD AUTO: 4 % — SIGNIFICANT CHANGE UP (ref 2–14)
NEUTROPHILS # BLD AUTO: 8.35 K/UL — HIGH (ref 1.8–7.4)
NEUTROPHILS NFR BLD AUTO: 3 % — LOW (ref 43–77)
NRBC # BLD: 0 /100 — SIGNIFICANT CHANGE UP (ref 0–0)
NRBC # BLD: SIGNIFICANT CHANGE UP /100 WBCS (ref 0–0)
PLAT MORPH BLD: NORMAL — SIGNIFICANT CHANGE UP
PLATELET # BLD AUTO: 71 K/UL — LOW (ref 150–400)
RBC # BLD: 3.78 M/UL — LOW (ref 4.2–5.8)
RBC # FLD: 14.5 % — SIGNIFICANT CHANGE UP (ref 10.3–14.5)
RBC BLD AUTO: SIGNIFICANT CHANGE UP
SMUDGE CELLS # BLD: PRESENT — SIGNIFICANT CHANGE UP
WBC # BLD: 278.21 K/UL — CRITICAL HIGH (ref 3.8–10.5)
WBC # FLD AUTO: 278.21 K/UL — CRITICAL HIGH (ref 3.8–10.5)

## 2020-06-05 RX ORDER — SERTRALINE 25 MG/1
200 TABLET, FILM COATED ORAL
Qty: 0 | Refills: 0 | DISCHARGE

## 2020-06-05 RX ORDER — ATORVASTATIN CALCIUM 80 MG/1
1 TABLET, FILM COATED ORAL
Qty: 0 | Refills: 0 | DISCHARGE

## 2020-06-05 RX ORDER — LISINOPRIL 2.5 MG/1
1 TABLET ORAL
Qty: 0 | Refills: 0 | DISCHARGE

## 2020-06-08 ENCOUNTER — APPOINTMENT (OUTPATIENT)
Dept: INFUSION THERAPY | Facility: HOSPITAL | Age: 50
End: 2020-06-08

## 2020-06-10 ENCOUNTER — APPOINTMENT (OUTPATIENT)
Dept: HEMATOLOGY ONCOLOGY | Facility: CLINIC | Age: 50
End: 2020-06-10
Payer: COMMERCIAL

## 2020-06-10 PROCEDURE — 99215 OFFICE O/P EST HI 40 MIN: CPT | Mod: 95

## 2020-06-10 RX ORDER — IBRUTINIB 140 MG/1
140 TABLET, FILM COATED ORAL DAILY
Qty: 90 | Refills: 1 | Status: ACTIVE | COMMUNITY
Start: 2020-06-10 | End: 1900-01-01

## 2020-06-15 ENCOUNTER — LABORATORY RESULT (OUTPATIENT)
Age: 50
End: 2020-06-15

## 2020-06-15 ENCOUNTER — RESULT REVIEW (OUTPATIENT)
Age: 50
End: 2020-06-15

## 2020-06-15 ENCOUNTER — APPOINTMENT (OUTPATIENT)
Dept: HEMATOLOGY ONCOLOGY | Facility: CLINIC | Age: 50
End: 2020-06-15
Payer: COMMERCIAL

## 2020-06-15 ENCOUNTER — APPOINTMENT (OUTPATIENT)
Dept: HEMATOLOGY ONCOLOGY | Facility: CLINIC | Age: 50
End: 2020-06-15

## 2020-06-15 PROCEDURE — 88365 INSITU HYBRIDIZATION (FISH): CPT | Mod: 26

## 2020-06-15 PROCEDURE — 88342 IMHCHEM/IMCYTCHM 1ST ANTB: CPT | Mod: 26,59

## 2020-06-15 PROCEDURE — 88305 TISSUE EXAM BY PATHOLOGIST: CPT | Mod: 26

## 2020-06-15 PROCEDURE — 85097 BONE MARROW INTERPRETATION: CPT

## 2020-06-15 PROCEDURE — 88313 SPECIAL STAINS GROUP 2: CPT | Mod: 26

## 2020-06-15 PROCEDURE — 88341 IMHCHEM/IMCYTCHM EA ADD ANTB: CPT | Mod: 26,59

## 2020-06-15 PROCEDURE — 38222 DX BONE MARROW BX & ASPIR: CPT | Mod: LT

## 2020-06-15 PROCEDURE — 88360 TUMOR IMMUNOHISTOCHEM/MANUAL: CPT | Mod: 26

## 2020-06-16 ENCOUNTER — LABORATORY RESULT (OUTPATIENT)
Age: 50
End: 2020-06-16

## 2020-06-16 NOTE — PROCEDURE
[Bone Marrow Biopsy] : bone marrow biopsy [Bone Marrow Aspiration] : bone marrow aspiration  [Patient] : the patient [Verbal Consent Obtained] : verbal consent was obtained prior to the procedure [Patient identification verified] : patient identification verified [Procedure verified and consent obtained] : procedure verified and consent obtained [Laterality verified and correct site marked] : laterality verified and correct site marked [Correct positioning] : correct positioning [Left] : site: left [Prone] : prone [Superior iliac spine was identified] : the superior iliac spine was identified. [The left posterior iliac crest was prepped with betadine and draped, using sterile technique.] : The left posterior iliac crest was prepped with betadine and draped, using sterile technique. [Aspirate] : aspirate [Lidocaine was injected and into the periosteum overlying the site.] : Lidocaine was injected and into the periosteum overlying the site. [Cytogenetics] : cytogenetics [FISH] : FISH [Flow Cytometry] : flow cytometry [Biopsy] : biopsy [FreeTextEntry1] : Anemia, thrombocytopenia, CLL  [] : The patient was instructed to remove the bandage the following AM. The patient may bathe. Acetaminophen may be taken for discomfort, as per package directions.If there are any other problems, the patient was instructed to call the office. The patient verbalized understanding, and is aware of the office contact numbers.

## 2020-06-19 ENCOUNTER — RECORD ABSTRACTING (OUTPATIENT)
Age: 50
End: 2020-06-19

## 2020-06-26 ENCOUNTER — APPOINTMENT (OUTPATIENT)
Dept: HEMATOLOGY ONCOLOGY | Facility: CLINIC | Age: 50
End: 2020-06-26
Payer: COMMERCIAL

## 2020-06-26 PROCEDURE — 99215 OFFICE O/P EST HI 40 MIN: CPT | Mod: 95

## 2020-06-26 NOTE — PHYSICAL EXAM
[Normal] : affect appropriate [Fully active, able to carry on all pre-disease performance without restriction] : Status 0 - Fully active, able to carry on all pre-disease performance without restriction [de-identified] : aox3 [de-identified] : no distress, speaking in full sentences

## 2020-06-26 NOTE — HISTORY OF PRESENT ILLNESS
[Disease:__________________________] : Disease: [unfilled] [Home] : at home, [unfilled] , at the time of the visit. [Medical Office: ([facility])___] : at the medical office located in  [Spouse] : spouse [Patient] : the patient [Self] : self [de-identified] : Mr. Hewitt is a a 49 year old male with CLL (dx 2006), treatment naive, who presents for follow-up after starting on ibrutinib on Monday June 22, 2020. He also started allopurinol. Denies any bleeding. He reports a little bit more energy now, he is taking it in the morning. He reported some diarrhea the first couple of days, but now it is better.\par He's getting his blood drawn next Monday at [facility]. Plan to review if he needs to continue allopurinol then. Reviewed potential side effects of both drugs. Also reminded him that he needs to have a baseline optho exam as rarely ibrutinib could cause ophtalmic issues. [de-identified] : CLL Markers:\par normal FISH\par CD38+\par unmutated.

## 2020-06-26 NOTE — ASSESSMENT
[FreeTextEntry1] : Mr. Hewitt is a 49 year old male CLL (unmutated IGHV), treatment naive, who presents for follow-up after starting acalabrutinib last Monday. He tolerated is tolerating it well.\par \par Plan:\par 1)CLL: continue acalabrutinib as he is tolerating well\par 2)Hypogammaglobulinemia: continue home IVIG\par 3)continue routine health maintenance and age appropriate screening as indicated--reminded him to have baseline optho test\par 4)RTO in 1 week to see how he is tolerating therapy.\par \par PMD:\par Melissa Evans

## 2020-06-29 ENCOUNTER — LABORATORY RESULT (OUTPATIENT)
Age: 50
End: 2020-06-29

## 2020-07-01 ENCOUNTER — APPOINTMENT (OUTPATIENT)
Dept: HEMATOLOGY ONCOLOGY | Facility: CLINIC | Age: 50
End: 2020-07-01
Payer: COMMERCIAL

## 2020-07-01 LAB
ALBUMIN SERPL ELPH-MCNC: 5.1 G/DL
ALP BLD-CCNC: 100 U/L
ALT SERPL-CCNC: 31 U/L
ANION GAP SERPL CALC-SCNC: 13 MMOL/L
AST SERPL-CCNC: 29 U/L
BASOPHILS # BLD AUTO: 3.98 K/UL
BASOPHILS NFR BLD AUTO: 1 %
BILIRUB SERPL-MCNC: 0.6 MG/DL
BUN SERPL-MCNC: 25 MG/DL
CALCIUM SERPL-MCNC: 9.4 MG/DL
CHLORIDE SERPL-SCNC: 101 MMOL/L
CO2 SERPL-SCNC: 25 MMOL/L
CREAT SERPL-MCNC: 1.07 MG/DL
EOSINOPHIL # BLD AUTO: 0 K/UL
EOSINOPHIL NFR BLD AUTO: 0 %
GLUCOSE SERPL-MCNC: 92 MG/DL
HCT VFR BLD CALC: 41.8 %
HGB BLD-MCNC: 9.9 G/DL
LDH SERPL-CCNC: 182 U/L
LYMPHOCYTES # BLD AUTO: 337.9 K/UL
LYMPHOCYTES NFR BLD AUTO: 85 %
MAGNESIUM SERPL-MCNC: 1.8 MG/DL
MAN DIFF?: NORMAL
MCHC RBC-ENTMCNC: 23.7 GM/DL
MCHC RBC-ENTMCNC: 24.1 PG
MCV RBC AUTO: 102 FL
MONOCYTES # BLD AUTO: 19.88 K/UL
MONOCYTES NFR BLD AUTO: 5 %
NEUTROPHILS # BLD AUTO: 15.9 K/UL
NEUTROPHILS NFR BLD AUTO: 4 %
PHOSPHATE SERPL-MCNC: 4.3 MG/DL
PLATELET # BLD AUTO: 78 K/UL
POTASSIUM SERPL-SCNC: 4.4 MMOL/L
PROT SERPL-MCNC: 6.5 G/DL
RBC # BLD: 4.1 M/UL
RBC # FLD: 18.6 %
SODIUM SERPL-SCNC: 139 MMOL/L
URATE SERPL-MCNC: 4.2 MG/DL
WBC # FLD AUTO: 397.53 K/UL

## 2020-07-01 PROCEDURE — 99215 OFFICE O/P EST HI 40 MIN: CPT | Mod: 95

## 2020-07-01 RX ORDER — DEXAMETHASONE 2 MG/1
2 TABLET ORAL
Qty: 53 | Refills: 0 | Status: DISCONTINUED | COMMUNITY
Start: 2020-06-02 | End: 2020-07-01

## 2020-07-01 RX ORDER — FLUTICASONE PROPIONATE 50 MCG
50 SPRAY, SUSPENSION NASAL
Refills: 0 | Status: DISCONTINUED | COMMUNITY
End: 2020-07-01

## 2020-07-01 RX ORDER — ALLOPURINOL 300 MG/1
300 TABLET ORAL
Qty: 30 | Refills: 0 | Status: DISCONTINUED | COMMUNITY
Start: 2020-06-22 | End: 2020-07-01

## 2020-07-01 RX ORDER — AZITHROMYCIN 250 MG/1
250 TABLET, FILM COATED ORAL
Qty: 1 | Refills: 0 | Status: DISCONTINUED | COMMUNITY
Start: 2020-03-30 | End: 2020-07-01

## 2020-07-01 RX ORDER — POLYETHYLENE GLYCOL 3350, SODIUM SULFATE, SODIUM CHLORIDE, POTASSIUM CHLORIDE, ASCORBIC ACID, SODIUM ASCORBATE 7.5-2.691G
100 KIT ORAL
Qty: 1 | Refills: 0 | Status: DISCONTINUED | COMMUNITY
Start: 2020-01-24 | End: 2020-07-01

## 2020-07-01 RX ORDER — ZOSTER VACCINE RECOMBINANT, ADJUVANTED 50 MCG/0.5
50 KIT INTRAMUSCULAR
Qty: 1 | Refills: 1 | Status: DISCONTINUED | COMMUNITY
Start: 2019-06-23 | End: 2020-07-01

## 2020-07-01 RX ORDER — CETIRIZINE HYDROCHLORIDE 10 MG/1
CAPSULE, LIQUID FILLED ORAL
Refills: 0 | Status: DISCONTINUED | COMMUNITY
End: 2020-07-01

## 2020-07-01 RX ORDER — FLUTICASONE PROPIONATE 50 UG/1
50 SPRAY, METERED NASAL DAILY
Qty: 1 | Refills: 1 | Status: DISCONTINUED | COMMUNITY
Start: 2020-03-30 | End: 2020-07-01

## 2020-07-01 RX ORDER — ZOSTER VACCINE RECOMBINANT, ADJUVANTED 50 MCG/0.5
50 KIT INTRAMUSCULAR
Qty: 1 | Refills: 1 | Status: DISCONTINUED | COMMUNITY
Start: 2019-09-30 | End: 2020-07-01

## 2020-07-01 RX ORDER — ZOSTER VACCINE RECOMBINANT, ADJUVANTED 50 MCG/0.5
50 KIT INTRAMUSCULAR
Qty: 1 | Refills: 0 | Status: DISCONTINUED | COMMUNITY
Start: 2019-04-22 | End: 2020-07-01

## 2020-07-01 RX ORDER — METHYLPREDNISOLONE 4 MG/1
4 TABLET ORAL
Qty: 21 | Refills: 0 | Status: DISCONTINUED | COMMUNITY
Start: 2020-05-19 | End: 2020-07-01

## 2020-07-01 NOTE — ASSESSMENT
[FreeTextEntry1] : Mr. Hewitt is a 49 year old male CLL (unmutated IGHV), started acalabrutinib end of June 2020. He is tolerating it well except for grade 1 hematoma.\par \par Plan:\par 1)CLL: continue acalabrutinib as he is tolerating well. He's having redistribution lymphocytosis. ok to stop allopurinol.\par 2)Hypogammaglobulinemia: continue home IVIG\par 3)continue routine health maintenance and age appropriate screening as indicated--reminded him to have baseline optho test\par 4)RTO in 1 week to see how he is tolerating therapy.\par \par PMD:\par Melissa Evans

## 2020-07-01 NOTE — PHYSICAL EXAM
[Fully active, able to carry on all pre-disease performance without restriction] : Status 0 - Fully active, able to carry on all pre-disease performance without restriction [Normal] : affect appropriate [de-identified] : no distress, speaking in full sentences [de-identified] : aox3

## 2020-07-01 NOTE — HISTORY OF PRESENT ILLNESS
[Disease:__________________________] : Disease: [unfilled] [Home] : at home, [unfilled] , at the time of the visit. [Medical Office: ([facility])___] : at the medical office located in  [Spouse] : spouse [Patient] : the patient [Self] : self [de-identified] : Mr. Hewitt is a a 49 year old male with CLL (dx 2006), who presents for follow-up after starting on ibrutinib on Monday June 22, 2020. \par Denies any bleeding but has notes a lot of bruises, including the area of the bone marrow, which is painful at times. He reports a little bit more energy now, he is taking pill in the morning. He reported some diarrhea the first couple of days, but now it is better.\par He has been getting IVIG at his home.\par  [de-identified] : CLL Markers:\par normal FISH\par CD38+\par unmutated.

## 2020-07-02 LAB
MISCELLANEOUS TEST: NORMAL
PROC NAME: NORMAL

## 2020-07-03 RX ORDER — ZOSTER VACCINE RECOMBINANT, ADJUVANTED 50 MCG/0.5
50 KIT INTRAMUSCULAR
Qty: 1 | Refills: 0 | Status: DISCONTINUED | COMMUNITY
Start: 2019-04-22 | End: 2020-07-01

## 2020-07-03 NOTE — ASSESSMENT
[FreeTextEntry1] : Mr. Hewitt is a 49 year old male CLL (unmutated IGHV), treatment naive, who presents for follow-up to discuss treatment strategies for initial therapy given progression of disease (rising WBC, lower Hgb/plt)-no B symptoms. He will be starting ibrutinib after lengthy discussion with him and his wife. The following side effects are common (occurring in greater than 30%) for patients taking ibrutinib: diarrhea, neutropenia, anemia, fatigue, muscle pain, UTI, nausea, brusing, etc. These are less common side effects (occurring in 10-29%) for patients receiving ibrutinib: dyspnea, rash, GI disturbances.\par \par Plan:\par 1)CLL: after hour-long discussion of all potential treatment options, patient and wife prefer to use ibrutinib \par 2)Hypogammaglobulinemia: continue home IVIG\par 3)continue routine health maintenance and age appropriate screening as indicated\par 4)RTO later this week to do baseline bone marrow biopsy prior to starting therapy.\par \par PMD:\par Melissa Evans

## 2020-07-03 NOTE — HISTORY OF PRESENT ILLNESS
[Disease:__________________________] : Disease: [unfilled] [Home] : at home, [unfilled] , at the time of the visit. [Medical Office: ([facility])___] : at the medical office located in  [Spouse] : spouse [Patient] : the patient [Self] : self [de-identified] : Mr. Hewitt is a a 49 year old male with CLL (dx 2006, unmutated IGHV), treatment naive, who presents for routine follow-up. History notable for [facility] admission for management of PNA and sepsis for 4 days in April 2019. He gets home infusions every 4 weeks (he has had issues with scheduling) without complications. Pt had met Jan-Feb 2020r with Dr. Santos for evaluation of diarrhea-colonoscopy and path only showed mild colitis, possibly a post infectious process.\par \par Over the last few months he has had worsening anemia and thrombocytopenia in the setting of rising leukocytosis related to CLL progression. We had a one hour talk today with him and his wife to discuss potential therapeutic strategies as he needs therapy (he was very hesitant to start any therapy before). He understands that he will need a bone marrow biopsy for baseline studies prior to starting him on therapy. We reviewed all potential treatment options including BTKi vs venetoclax-based regimen. After thorough discussion of all potential toxicities, Mr. Hewitt wishes to be started on ibrutinib (his mother has been on it for several years). [de-identified] : CLL Markers:\par normal FISH\par CD38+\par unmutated.  [FreeTextEntry4] : wife

## 2020-07-03 NOTE — PHYSICAL EXAM
[Restricted in physically strenuous activity but ambulatory and able to carry out work of a light or sedentary nature] : Status 1- Restricted in physically strenuous activity but ambulatory and able to carry out work of a light or sedentary nature, e.g., light house work, office work [Normal] : affect appropriate [de-identified] : no distress, speakingin full sentences, calm [de-identified] : aox3

## 2020-07-06 ENCOUNTER — LABORATORY RESULT (OUTPATIENT)
Age: 50
End: 2020-07-06

## 2020-07-07 LAB
ALBUMIN SERPL ELPH-MCNC: 4.9 G/DL
ALP BLD-CCNC: 90 U/L
ALT SERPL-CCNC: 23 U/L
ANION GAP SERPL CALC-SCNC: 12 MMOL/L
AST SERPL-CCNC: 25 U/L
BASOPHILS # BLD AUTO: 0.32 K/UL
BASOPHILS NFR BLD AUTO: 0.1 %
BILIRUB SERPL-MCNC: 0.6 MG/DL
BUN SERPL-MCNC: 24 MG/DL
CALCIUM SERPL-MCNC: 9.4 MG/DL
CHLORIDE SERPL-SCNC: 102 MMOL/L
CO2 SERPL-SCNC: 25 MMOL/L
CREAT SERPL-MCNC: 1.09 MG/DL
EOSINOPHIL # BLD AUTO: 0.26 K/UL
EOSINOPHIL NFR BLD AUTO: 0.1 %
GLUCOSE SERPL-MCNC: 78 MG/DL
HCT VFR BLD CALC: 39.9 %
HGB BLD-MCNC: 10.1 G/DL
IMM GRANULOCYTES NFR BLD AUTO: 0.2 %
LDH SERPL-CCNC: 166 U/L
LYMPHOCYTES # BLD AUTO: 358.62 K/UL
LYMPHOCYTES NFR BLD AUTO: 96.5 %
MAGNESIUM SERPL-MCNC: 1.8 MG/DL
MAN DIFF?: NORMAL
MCHC RBC-ENTMCNC: 25.3 GM/DL
MCHC RBC-ENTMCNC: 25.9 PG
MCV RBC AUTO: 102.3 FL
MONOCYTES # BLD AUTO: 6.21 K/UL
MONOCYTES NFR BLD AUTO: 1.7 %
NEUTROPHILS # BLD AUTO: 5.5 K/UL
NEUTROPHILS NFR BLD AUTO: 1.4 %
PHOSPHATE SERPL-MCNC: 3.6 MG/DL
PLATELET # BLD AUTO: 116 K/UL
POTASSIUM SERPL-SCNC: 4.1 MMOL/L
PROT SERPL-MCNC: 6.4 G/DL
RBC # BLD: 3.9 M/UL
RBC # FLD: NORMAL
SODIUM SERPL-SCNC: 140 MMOL/L
URATE SERPL-MCNC: 5.1 MG/DL
WBC # FLD AUTO: 371.63 K/UL

## 2020-07-08 ENCOUNTER — APPOINTMENT (OUTPATIENT)
Dept: HEMATOLOGY ONCOLOGY | Facility: CLINIC | Age: 50
End: 2020-07-08
Payer: COMMERCIAL

## 2020-07-08 PROCEDURE — 99215 OFFICE O/P EST HI 40 MIN: CPT | Mod: 95

## 2020-07-08 NOTE — HISTORY OF PRESENT ILLNESS
[Disease:__________________________] : Disease: [unfilled] [Home] : at home, [unfilled] , at the time of the visit. [Medical Office: ([facility])___] : at the medical office located in  [Spouse] : spouse [Patient] : the patient [Self] : self [de-identified] : Mr. Hewitt is a a 49 year old male with CLL (dx 2006), who presents for follow-up after starting on ibrutinib on Monday June 22, 2020. \par Denies any bleeding but has notes a lot of bruises. He reports a little bit more energy now, he is taking pill in the morning. He reported some diarrhea the first couple of days, but now it is better. He has been getting IVIG at his home. [de-identified] : CLL Markers:\par normal FISH\par CD38+\par unmutated.

## 2020-07-08 NOTE — PHYSICAL EXAM
[Fully active, able to carry on all pre-disease performance without restriction] : Status 0 - Fully active, able to carry on all pre-disease performance without restriction [de-identified] : no distress, speaking in full sentences [Normal] : pharynx is unremarkable, moist mucus membrane, no oral lesions [de-identified] : aox3

## 2020-07-13 ENCOUNTER — LABORATORY RESULT (OUTPATIENT)
Age: 50
End: 2020-07-13

## 2020-07-14 LAB
ALBUMIN SERPL ELPH-MCNC: 4.9 G/DL
ALP BLD-CCNC: 78 U/L
ALT SERPL-CCNC: 18 U/L
ANION GAP SERPL CALC-SCNC: 16 MMOL/L
AST SERPL-CCNC: 26 U/L
BILIRUB SERPL-MCNC: 0.5 MG/DL
BUN SERPL-MCNC: 22 MG/DL
CALCIUM SERPL-MCNC: 9.6 MG/DL
CHLORIDE SERPL-SCNC: 101 MMOL/L
CO2 SERPL-SCNC: 23 MMOL/L
CREAT SERPL-MCNC: 1.03 MG/DL
GLUCOSE SERPL-MCNC: 83 MG/DL
LDH SERPL-CCNC: 165 U/L
MAGNESIUM SERPL-MCNC: 1.8 MG/DL
PHOSPHATE SERPL-MCNC: 3.9 MG/DL
POTASSIUM SERPL-SCNC: 4.2 MMOL/L
PROT SERPL-MCNC: 6.3 G/DL
SODIUM SERPL-SCNC: 139 MMOL/L
URATE SERPL-MCNC: 5 MG/DL

## 2020-07-15 ENCOUNTER — APPOINTMENT (OUTPATIENT)
Dept: HEMATOLOGY ONCOLOGY | Facility: CLINIC | Age: 50
End: 2020-07-15
Payer: COMMERCIAL

## 2020-07-15 ENCOUNTER — LABORATORY RESULT (OUTPATIENT)
Age: 50
End: 2020-07-15

## 2020-07-15 PROCEDURE — 99214 OFFICE O/P EST MOD 30 MIN: CPT | Mod: 95

## 2020-07-16 ENCOUNTER — OUTPATIENT (OUTPATIENT)
Dept: OUTPATIENT SERVICES | Facility: HOSPITAL | Age: 50
LOS: 1 days | Discharge: HOME | End: 2020-07-16

## 2020-07-16 ENCOUNTER — APPOINTMENT (OUTPATIENT)
Dept: HEMATOLOGY ONCOLOGY | Facility: CLINIC | Age: 50
End: 2020-07-16
Payer: COMMERCIAL

## 2020-07-16 DIAGNOSIS — D64.9 ANEMIA, UNSPECIFIED: ICD-10-CM

## 2020-07-16 LAB
ALBUMIN SERPL ELPH-MCNC: 4.7 G/DL
ALP BLD-CCNC: 75 U/L
ALT SERPL-CCNC: 18 U/L
ANION GAP SERPL CALC-SCNC: 11 MMOL/L
AST SERPL-CCNC: 23 U/L
BASOPHILS # BLD AUTO: 0.13 K/UL
BASOPHILS # BLD AUTO: 0.22 K/UL
BASOPHILS NFR BLD AUTO: 0 %
BASOPHILS NFR BLD AUTO: 0.1 %
BILIRUB SERPL-MCNC: 0.5 MG/DL
BUN SERPL-MCNC: 19 MG/DL
CALCIUM SERPL-MCNC: 9.1 MG/DL
CHLORIDE SERPL-SCNC: 101 MMOL/L
CO2 SERPL-SCNC: 26 MMOL/L
CREAT SERPL-MCNC: 1.06 MG/DL
DIRECT COOMBS: NORMAL
EOSINOPHIL # BLD AUTO: 0.25 K/UL
EOSINOPHIL # BLD AUTO: 0.27 K/UL
EOSINOPHIL NFR BLD AUTO: 0.1 %
EOSINOPHIL NFR BLD AUTO: 0.1 %
FERRITIN SERPL-MCNC: 123 NG/ML
FOLATE SERPL-MCNC: >20 NG/ML
GLUCOSE SERPL-MCNC: 100 MG/DL
HAPTOGLOB SERPL-MCNC: 59 MG/DL
HCT VFR BLD CALC: 38.2 %
HCT VFR BLD CALC: 40 %
HGB BLD-MCNC: 8.6 G/DL
HGB BLD-MCNC: 9.3 G/DL
IMM GRANULOCYTES NFR BLD AUTO: 0.1 %
IMM GRANULOCYTES NFR BLD AUTO: 0.2 %
IRON SATN MFR SERPL: 33 %
IRON SERPL-MCNC: 101 UG/DL
LYMPHOCYTES # BLD AUTO: 350.1 K/UL
LYMPHOCYTES # BLD AUTO: 351.86 K/UL
LYMPHOCYTES NFR BLD AUTO: 97.6 %
LYMPHOCYTES NFR BLD AUTO: 97.9 %
MAN DIFF?: NORMAL
MAN DIFF?: NORMAL
MCHC RBC-ENTMCNC: 21.5 GM/DL
MCHC RBC-ENTMCNC: 22.3 PG
MCHC RBC-ENTMCNC: 24.3 GM/DL
MCHC RBC-ENTMCNC: 24.9 PG
MCV RBC AUTO: 102.4 FL
MCV RBC AUTO: 103.6 FL
MONOCYTES # BLD AUTO: 1.58 K/UL
MONOCYTES # BLD AUTO: 1.6 K/UL
MONOCYTES NFR BLD AUTO: 0.4 %
MONOCYTES NFR BLD AUTO: 0.4 %
NEUTROPHILS # BLD AUTO: 5.19 K/UL
NEUTROPHILS # BLD AUTO: 5.8 K/UL
NEUTROPHILS NFR BLD AUTO: 1.5 %
NEUTROPHILS NFR BLD AUTO: 1.6 %
PLATELET # BLD AUTO: 126 K/UL
PLATELET # BLD AUTO: 132 K/UL
POTASSIUM SERPL-SCNC: 4.1 MMOL/L
PROT SERPL-MCNC: 6.2 G/DL
RBC # BLD: 3.73 M/UL
RBC # BLD: 3.73 M/UL
RBC # BLD: 3.86 M/UL
RBC # FLD: NORMAL
RBC # FLD: NORMAL
RETICS # AUTO: 1.9 %
RETICS AGGREG/RBC NFR: 70.9 K/UL
SODIUM SERPL-SCNC: 138 MMOL/L
TIBC SERPL-MCNC: 301 UG/DL
UIBC SERPL-MCNC: 200 UG/DL
VIT B12 SERPL-MCNC: 720 PG/ML
WBC # FLD AUTO: 358.57 K/UL
WBC # FLD AUTO: 359.49 K/UL

## 2020-07-16 PROCEDURE — 99214 OFFICE O/P EST MOD 30 MIN: CPT | Mod: 95

## 2020-07-20 NOTE — ASSESSMENT
[FreeTextEntry1] : Mr. Hewitt is a 49 year old male CLL (unmutated IGHV), started acalabrutinib end of June 2020. He is tolerating it well but now with 2gm drop in Hgb--unclear if it is real or if this is drug-induced vs inaccurate sampling. I asked patient to have it repeated asap, and he wants to go to get tested today if possible.\par \par Plan:\par 1)CLL: continue acalabrutinib as he is tolerating well. He's having redistribution lymphocytosis. unclear etiology of Hgb drop, not sure if this is related to inaccurate sampling vs iron deficiency vs AIHA. We will do lab testing to rule out all of the above.\par 2)Hypogammaglobulinemia: continue home IVIG\par 3)continue routine health maintenance and age appropriate screening as indicated\par 4)RTO after labs to discuss plans for the weekend as he is leaving town\par \par PMD:\par Melissa Evans

## 2020-07-20 NOTE — PHYSICAL EXAM
[Fully active, able to carry on all pre-disease performance without restriction] : Status 0 - Fully active, able to carry on all pre-disease performance without restriction [Normal] : affect appropriate [de-identified] : no distress, speaking in full sentences [de-identified] : aox3

## 2020-07-20 NOTE — HISTORY OF PRESENT ILLNESS
[Disease:__________________________] : Disease: [unfilled] [Home] : at home, [unfilled] , at the time of the visit. [Medical Office: ([facility])___] : at the medical office located in  [Spouse] : spouse [Patient] : the patient [Self] : self [de-identified] : Mr. Hewitt is a a 49 year old male with CLL (dx 2006), who presents for follow-up after starting on ibrutinib on Monday June 22, 2020. He is here for weekly f.u to see how he tolerates the drug. We reviewed the labs he had drawn on 7/13/20 , hgb dropped from 10+ to 8.6, plt 132k. He is concerned because he's scheduled to travel this weekend [facility]. We discussed that we could recheck his anemia work-up labs to rule out AIHA or GIB. [de-identified] : CLL Markers:\par normal FISH\par CD38+\par unmutated.

## 2020-07-21 NOTE — HISTORY OF PRESENT ILLNESS
[Disease:__________________________] : Disease: [unfilled] [Medical Office: ([facility])___] : at the medical office located in  [Home] : at home, [unfilled] , at the time of the visit. [Spouse] : spouse [Patient] : the patient [Self] : self [de-identified] : Mr. Hewitt is a a 49 year old male with CLL (dx 2006), who presents for follow-up after starting on ibrutinib on Monday June 22, 2020.\par We saw him in f.u Clinic yesterday. He was noted to have a lower hemoglobin but it wasn’t clear whether this was related to AIHA vs iron deficiency vs spurious. Stat repeat testing shows that the hemoglobin is now 9.3. Iron studies actually show an improved number, Haptoglobin, previously undetectable is now better, there’s no evidence of Lupe hemolytic anemia. We have reassured the patient that the laboratory of 8 yesterday could be spurious due to underfilling of the tube. We will continue on ibrutinib. [de-identified] : CLL Markers:\par normal FISH\par CD38+\par unmutated.

## 2020-07-21 NOTE — ASSESSMENT
[FreeTextEntry1] : Mr. Hewitt is a 49 year old male CLL (unmutated IGHV), started acalabrutinib end of June 2020. He is tolerating it well but now Hgb is 9+. Repeat testing of all prior markers (low iron levels, low hapto) are now back within normal levels. Will continue to monitor on ibrutinib.\par \par Plan:\par 1)CLL: continue acalabrutinib as he is tolerating well. He continues to have redistribution lymphocytosis. Hgb stable at 9+\par 2)Hypogammaglobulinemia: continue home IVIG\par 3)continue routine health maintenance and age appropriate screening as indicated\par 4)RTO next week to see how he's tolerating drug.\par \par PMD:\par Melissa Evans

## 2020-07-21 NOTE — PHYSICAL EXAM
[Fully active, able to carry on all pre-disease performance without restriction] : Status 0 - Fully active, able to carry on all pre-disease performance without restriction [Normal] : affect appropriate [de-identified] : no distress, speaking in full sentences [de-identified] : aox3

## 2020-07-22 ENCOUNTER — LABORATORY RESULT (OUTPATIENT)
Age: 50
End: 2020-07-22

## 2020-07-23 ENCOUNTER — APPOINTMENT (OUTPATIENT)
Dept: HEMATOLOGY ONCOLOGY | Facility: CLINIC | Age: 50
End: 2020-07-23

## 2020-07-23 LAB
ALBUMIN SERPL ELPH-MCNC: 4.7 G/DL
ALP BLD-CCNC: 71 U/L
ALT SERPL-CCNC: 18 U/L
ANION GAP SERPL CALC-SCNC: 13 MMOL/L
AST SERPL-CCNC: 22 U/L
BASOPHILS # BLD AUTO: 0.13 K/UL
BASOPHILS NFR BLD AUTO: 0 %
BILIRUB SERPL-MCNC: 0.5 MG/DL
BUN SERPL-MCNC: 17 MG/DL
CALCIUM SERPL-MCNC: 9.3 MG/DL
CHLORIDE SERPL-SCNC: 103 MMOL/L
CO2 SERPL-SCNC: 26 MMOL/L
CREAT SERPL-MCNC: 1.21 MG/DL
EOSINOPHIL # BLD AUTO: 0.22 K/UL
EOSINOPHIL NFR BLD AUTO: 0.1 %
GLUCOSE SERPL-MCNC: 82 MG/DL
HCT VFR BLD CALC: 40.8 %
HGB BLD-MCNC: 9.9 G/DL
IMM GRANULOCYTES NFR BLD AUTO: 0.2 %
LDH SERPL-CCNC: 148 U/L
LYMPHOCYTES # BLD AUTO: 345.89 K/UL
LYMPHOCYTES NFR BLD AUTO: 97.8 %
MAGNESIUM SERPL-MCNC: 1.7 MG/DL
MAN DIFF?: NORMAL
MCHC RBC-ENTMCNC: 24.3 GM/DL
MCHC RBC-ENTMCNC: 24.4 PG
MCV RBC AUTO: 100.5 FL
MONOCYTES # BLD AUTO: 1.43 K/UL
MONOCYTES NFR BLD AUTO: 0.4 %
NEUTROPHILS # BLD AUTO: 5.29 K/UL
NEUTROPHILS NFR BLD AUTO: 1.5 %
PHOSPHATE SERPL-MCNC: 5.1 MG/DL
PLATELET # BLD AUTO: 125 K/UL
POTASSIUM SERPL-SCNC: 4.8 MMOL/L
PROT SERPL-MCNC: 5.9 G/DL
RBC # BLD: 4.06 M/UL
RBC # FLD: NORMAL
SODIUM SERPL-SCNC: 141 MMOL/L
URATE SERPL-MCNC: 5.4 MG/DL
WBC # FLD AUTO: 353.53 K/UL

## 2020-07-24 ENCOUNTER — APPOINTMENT (OUTPATIENT)
Dept: HEMATOLOGY ONCOLOGY | Facility: CLINIC | Age: 50
End: 2020-07-24
Payer: COMMERCIAL

## 2020-07-24 PROCEDURE — 99215 OFFICE O/P EST HI 40 MIN: CPT | Mod: 95

## 2020-07-24 NOTE — PHYSICAL EXAM
[Fully active, able to carry on all pre-disease performance without restriction] : Status 0 - Fully active, able to carry on all pre-disease performance without restriction [Normal] : pharynx is unremarkable, moist mucus membrane, no oral lesions [de-identified] : no distress, speaking in full sentences [de-identified] : aox3

## 2020-07-24 NOTE — HISTORY OF PRESENT ILLNESS
[Disease:__________________________] : Disease: [unfilled] [Home] : at home, [unfilled] , at the time of the visit. [Medical Office: ([facility])___] : at the medical office located in  [Spouse] : spouse [Patient] : the patient [Self] : self [de-identified] : Mr. Hewitt is a a 49 year old male with CLL (dx 2006), who presents for follow-up after starting on ibrutinib on Monday June 22, 2020.\par We saw him in f.u Clinic last week (weekly/bi-weekly for the first 8 weeks while on the new drug). We reviewed his labs, his WBC is stable, his Hgb is coming up, plt (126K) are double from baseline. No new symptom. We also discussed with patient his other labs, all NCS except for borderline hypoproteinemia and hyperphosphatemia--could be related to the week-end (he was partying with friends this weekend). [de-identified] : CLL Markers:\par normal FISH\par CD38+\par unmutated.  [FreeTextEntry4] : wife

## 2020-07-24 NOTE — ASSESSMENT
[FreeTextEntry1] : Mr. Hewitt is a 49 year old male CLL (unmutated IGHV), started ibrutinib June 22, 2020. He is tolerating it well still w redistribution lymphocytosis. Repeat testing of all prior markers (low iron levels, low hapto) are now back within normal levels. Will continue to monitor on ibrutinib.\par \par Plan:\par 1)CLL: continue ibrutinib as he is tolerating well. He continues to have redistribution lymphocytosis. Hgb stable at 9+\par 2)Hypogammaglobulinemia: continue home IVIG\par 3)continue routine health maintenance and age appropriate screening as indicated\par 4)RTO next week to see how he's tolerating drug.\par \par PMD:\par Melissa Evans

## 2020-07-27 ENCOUNTER — RX RENEWAL (OUTPATIENT)
Age: 50
End: 2020-07-27

## 2020-08-11 ENCOUNTER — LABORATORY RESULT (OUTPATIENT)
Age: 50
End: 2020-08-11

## 2020-08-12 ENCOUNTER — APPOINTMENT (OUTPATIENT)
Dept: HEMATOLOGY ONCOLOGY | Facility: CLINIC | Age: 50
End: 2020-08-12
Payer: COMMERCIAL

## 2020-08-12 LAB
ALBUMIN SERPL ELPH-MCNC: 4.7 G/DL
ALP BLD-CCNC: 63 U/L
ALT SERPL-CCNC: 22 U/L
ANION GAP SERPL CALC-SCNC: 12 MMOL/L
AST SERPL-CCNC: 21 U/L
BASOPHILS # BLD AUTO: 0.15 K/UL
BASOPHILS NFR BLD AUTO: 0 %
BILIRUB SERPL-MCNC: 0.5 MG/DL
BUN SERPL-MCNC: 21 MG/DL
CALCIUM SERPL-MCNC: 8.9 MG/DL
CHLORIDE SERPL-SCNC: 102 MMOL/L
CO2 SERPL-SCNC: 24 MMOL/L
CREAT SERPL-MCNC: 1.1 MG/DL
EOSINOPHIL # BLD AUTO: 0.19 K/UL
EOSINOPHIL NFR BLD AUTO: 0.1 %
GLUCOSE SERPL-MCNC: 96 MG/DL
HCT VFR BLD CALC: 40.6 %
HGB BLD-MCNC: 10.5 G/DL
IMM GRANULOCYTES NFR BLD AUTO: 0.2 %
LDH SERPL-CCNC: 170 U/L
LYMPHOCYTES # BLD AUTO: 324.61 K/UL
LYMPHOCYTES NFR BLD AUTO: 97.6 %
MAGNESIUM SERPL-MCNC: 1.7 MG/DL
MAN DIFF?: YES
MCHC RBC-ENTMCNC: 25.5 PG
MCHC RBC-ENTMCNC: 25.9 GM/DL
MCV RBC AUTO: 98.8 FL
MONOCYTES # BLD AUTO: 1.15 K/UL
MONOCYTES NFR BLD AUTO: 0.3 %
NEUTROPHILS # BLD AUTO: 5.87 K/UL
NEUTROPHILS NFR BLD AUTO: 1.8 %
PHOSPHATE SERPL-MCNC: 3.8 MG/DL
PLATELET # BLD AUTO: 132 K/UL
POTASSIUM SERPL-SCNC: 4.4 MMOL/L
PROT SERPL-MCNC: 6 G/DL
RBC # BLD: 4.11 M/UL
RBC # FLD: NORMAL
SODIUM SERPL-SCNC: 138 MMOL/L
URATE SERPL-MCNC: 4.7 MG/DL
WBC # FLD AUTO: 332.57 K/UL

## 2020-08-12 PROCEDURE — 99215 OFFICE O/P EST HI 40 MIN: CPT | Mod: 95

## 2020-08-13 NOTE — HISTORY OF PRESENT ILLNESS
[Disease:__________________________] : Disease: [unfilled] [Home] : at home, [unfilled] , at the time of the visit. [Medical Office: ([facility])___] : at the medical office located in  [Spouse] : spouse [Patient] : the patient [Self] : self [de-identified] : Mr. Hewitt is a a 49 year old male with CLL (dx 2006), who presents for follow-up after starting on ibrutinib on Monday June 22, 2020.\par We saw him in f.u Clinic July 21, 2020 (weekly/bi-weekly for the first 8 weeks while on the new drug). We reviewed his labs, his WBC is stable, his Hgb is coming up, plt (130sK). No new symptom. He will get his next IVIG tomorrow at home. [de-identified] : CLL Markers:\par normal FISH\par CD38+\par unmutated.  [FreeTextEntry4] : wife

## 2020-08-13 NOTE — ASSESSMENT
[FreeTextEntry1] : Mr. Hewitt is a 49 year old male CLL (unmutated IGHV), started ibrutinib June 22, 2020. He is tolerating it well still w redistribution lymphocytosis. No new symptoms, to continue to monitor on ibrutinib.\par \par Plan:\par 1)CLL: continue ibrutinib as he is tolerating well. He continues to have redistribution lymphocytosis. Hgb up\par 2)Hypogammaglobulinemia: continue home IVIG\par 3)continue routine health maintenance and age appropriate screening as indicated\par 4)RTO in 2 weeks, after that he can have monthly follow-ups.\par \par PMD:\par Melissa Evans

## 2020-08-13 NOTE — PHYSICAL EXAM
[Fully active, able to carry on all pre-disease performance without restriction] : Status 0 - Fully active, able to carry on all pre-disease performance without restriction [Normal] : affect appropriate [de-identified] : aox3 [de-identified] : no distress, speaking in full sentences

## 2020-08-20 ENCOUNTER — LABORATORY RESULT (OUTPATIENT)
Age: 50
End: 2020-08-20

## 2020-08-21 LAB
ALBUMIN SERPL ELPH-MCNC: 4.8 G/DL
ALP BLD-CCNC: 69 U/L
ALT SERPL-CCNC: 23 U/L
ANION GAP SERPL CALC-SCNC: 11 MMOL/L
AST SERPL-CCNC: 30 U/L
BILIRUB SERPL-MCNC: 0.5 MG/DL
BUN SERPL-MCNC: 21 MG/DL
CALCIUM SERPL-MCNC: 9.5 MG/DL
CHLORIDE SERPL-SCNC: 102 MMOL/L
CO2 SERPL-SCNC: 25 MMOL/L
CREAT SERPL-MCNC: 1.03 MG/DL
GLUCOSE SERPL-MCNC: 76 MG/DL
LDH SERPL-CCNC: 183 U/L
MAGNESIUM SERPL-MCNC: 1.9 MG/DL
PHOSPHATE SERPL-MCNC: 4 MG/DL
POTASSIUM SERPL-SCNC: 4.2 MMOL/L
PROT SERPL-MCNC: 6.6 G/DL
SODIUM SERPL-SCNC: 139 MMOL/L
URATE SERPL-MCNC: 4.8 MG/DL

## 2020-08-24 ENCOUNTER — APPOINTMENT (OUTPATIENT)
Dept: HEMATOLOGY ONCOLOGY | Facility: CLINIC | Age: 50
End: 2020-08-24
Payer: COMMERCIAL

## 2020-08-24 LAB
BASOPHILS # BLD AUTO: 0.14 K/UL
BASOPHILS NFR BLD AUTO: 0 %
EOSINOPHIL # BLD AUTO: 0.17 K/UL
EOSINOPHIL NFR BLD AUTO: 0.1 %
HCT VFR BLD CALC: 44.1 %
HGB BLD-MCNC: 11.3 G/DL
IMM GRANULOCYTES NFR BLD AUTO: 0.2 %
LYMPHOCYTES # BLD AUTO: 323.57 K/UL
LYMPHOCYTES NFR BLD AUTO: 97.3 %
MAN DIFF?: NORMAL
MCHC RBC-ENTMCNC: 25.5 PG
MCHC RBC-ENTMCNC: 25.6 GM/DL
MCV RBC AUTO: 99.3 FL
MONOCYTES # BLD AUTO: 1.85 K/UL
MONOCYTES NFR BLD AUTO: 0.6 %
NEUTROPHILS # BLD AUTO: 6.38 K/UL
NEUTROPHILS NFR BLD AUTO: 1.8 %
PLATELET # BLD AUTO: 132 K/UL
RBC # BLD: 4.44 M/UL
RBC # FLD: NORMAL
WBC # FLD AUTO: 332.68 K/UL

## 2020-08-24 PROCEDURE — 99215 OFFICE O/P EST HI 40 MIN: CPT | Mod: 95

## 2020-08-24 NOTE — ASSESSMENT
[FreeTextEntry1] : Mr. Hewitt is a 49 year old male CLL (unmutated IGHV), started ibrutinib June 22, 2020. He is tolerating it well still with redistribution lymphocytosis. No new symptoms, to continue to monitor on ibrutinib. His Hgb increased to >11 for first time. He feels more energetic. Reviewed COVID precautions as he is going back to work soon. \par \par Plan:\par 1)CLL: continue ibrutinib as he is tolerating well. He continues to have redistribution lymphocytosis. Hgb up\par 2)Hypogammaglobulinemia: continue home IVIG\par 3)continue routine health maintenance and age appropriate screening as indicated\par 4)RTO in 4 weeks\par \par PMD:\par Melissa Evans

## 2020-08-24 NOTE — PHYSICAL EXAM
[Fully active, able to carry on all pre-disease performance without restriction] : Status 0 - Fully active, able to carry on all pre-disease performance without restriction [Normal] : pharynx is unremarkable, moist mucus membrane, no oral lesions [de-identified] : no distress, speaking in full sentences [de-identified] : aox3

## 2020-08-24 NOTE — HISTORY OF PRESENT ILLNESS
[Disease:__________________________] : Disease: [unfilled] [Medical Office: ([facility])___] : at the medical office located in  [Home] : at home, [unfilled] , at the time of the visit. [Patient] : the patient [Self] : self [de-identified] : Mr. Hewitt is a a 49 year old male with CLL (dx 2006), who presents for follow-up after starting on ibrutinib on Monday June 22, 2020.\par We saw him in f.u Clinic July 21, 2020 (weekly/bi-weekly for the first 8 weeks while on the new drug). We reviewed his labs, his WBC is stable, his Hgb is coming up, plt (130sK). No new symptoms except for occasional myalgias, grade 1. We reviewed strategies to improve them. [de-identified] : CLL Markers:\par normal FISH\par CD38+\par unmutated.

## 2020-08-25 ENCOUNTER — RX RENEWAL (OUTPATIENT)
Age: 50
End: 2020-08-25

## 2020-09-24 ENCOUNTER — LABORATORY RESULT (OUTPATIENT)
Age: 50
End: 2020-09-24

## 2020-09-25 LAB
ALBUMIN SERPL ELPH-MCNC: 4.9 G/DL
ALP BLD-CCNC: 72 U/L
ALT SERPL-CCNC: 21 U/L
ANION GAP SERPL CALC-SCNC: 16 MMOL/L
AST SERPL-CCNC: 19 U/L
BILIRUB SERPL-MCNC: 0.4 MG/DL
BUN SERPL-MCNC: 21 MG/DL
CALCIUM SERPL-MCNC: 9.7 MG/DL
CHLORIDE SERPL-SCNC: 100 MMOL/L
CO2 SERPL-SCNC: 25 MMOL/L
CREAT SERPL-MCNC: 1.19 MG/DL
GLUCOSE SERPL-MCNC: 70 MG/DL
LDH SERPL-CCNC: 136 U/L
MAGNESIUM SERPL-MCNC: 1.9 MG/DL
PHOSPHATE SERPL-MCNC: 4.8 MG/DL
POTASSIUM SERPL-SCNC: 4.4 MMOL/L
PROT SERPL-MCNC: 6.8 G/DL
SODIUM SERPL-SCNC: 141 MMOL/L
URATE SERPL-MCNC: 5.5 MG/DL

## 2020-09-28 LAB
BASOPHILS # BLD AUTO: 0.13 K/UL
BASOPHILS NFR BLD AUTO: 0 %
EOSINOPHIL # BLD AUTO: 0.11 K/UL
EOSINOPHIL NFR BLD AUTO: 0 %
HCT VFR BLD CALC: 48 %
HGB BLD-MCNC: 13.1 G/DL
IMM GRANULOCYTES NFR BLD AUTO: 0.2 %
LYMPHOCYTES # BLD AUTO: 258.33 K/UL
LYMPHOCYTES NFR BLD AUTO: 96.5 %
MAN DIFF?: NORMAL
MCHC RBC-ENTMCNC: 26.2 PG
MCHC RBC-ENTMCNC: 27.3 GM/DL
MCV RBC AUTO: 96 FL
MONOCYTES # BLD AUTO: 1.55 K/UL
MONOCYTES NFR BLD AUTO: 0.6 %
NEUTROPHILS # BLD AUTO: 7.01 K/UL
NEUTROPHILS NFR BLD AUTO: 2.7 %
PLATELET # BLD AUTO: 165 K/UL
RBC # BLD: 5 M/UL
RBC # FLD: 13.7 %
WBC # FLD AUTO: 267.6 K/UL

## 2020-10-01 ENCOUNTER — APPOINTMENT (OUTPATIENT)
Dept: HEMATOLOGY ONCOLOGY | Facility: CLINIC | Age: 50
End: 2020-10-01
Payer: COMMERCIAL

## 2020-10-01 PROCEDURE — 99215 OFFICE O/P EST HI 40 MIN: CPT | Mod: 95

## 2020-10-01 NOTE — PHYSICAL EXAM
[Fully active, able to carry on all pre-disease performance without restriction] : Status 0 - Fully active, able to carry on all pre-disease performance without restriction [Normal] : affect appropriate [de-identified] : no distress, speaking in full sentences [de-identified] : aox3

## 2020-10-01 NOTE — HISTORY OF PRESENT ILLNESS
[Disease:__________________________] : Disease: [unfilled] [Medical Office: ([facility])___] : at the medical office located in  [Patient] : the patient [Self] : self [Other Location: e.g. School (Enter Location, City,State)___] : at [unfilled], at the time of the visit. [de-identified] : Mr. Hewitt is a a 50 year old male with CLL (dx 2006), who presents for follow-up after starting on ibrutinib on Monday June 22, 2020. He is now back at work and his children are back at school. He continues to tolerate the drug well without major tolerability issues. We reviewed his latest labs, all much improved, WBC has started to come down, Hgb and plt are now wnl. I reminded him it is important to f/u with his internist, so he said he will call to make a f/u appt. [de-identified] : CLL Markers:\par normal FISH\par CD38+\par unmutated.

## 2020-10-06 ENCOUNTER — APPOINTMENT (OUTPATIENT)
Dept: INTERNAL MEDICINE | Facility: CLINIC | Age: 50
End: 2020-10-06

## 2020-11-03 ENCOUNTER — TRANSCRIPTION ENCOUNTER (OUTPATIENT)
Age: 50
End: 2020-11-03

## 2020-11-19 ENCOUNTER — APPOINTMENT (OUTPATIENT)
Dept: HEMATOLOGY ONCOLOGY | Facility: CLINIC | Age: 50
End: 2020-11-19
Payer: COMMERCIAL

## 2020-11-19 PROCEDURE — 99441: CPT

## 2020-11-22 ENCOUNTER — NON-APPOINTMENT (OUTPATIENT)
Age: 50
End: 2020-11-22

## 2020-11-22 ENCOUNTER — APPOINTMENT (OUTPATIENT)
Dept: HEMATOLOGY ONCOLOGY | Facility: CLINIC | Age: 50
End: 2020-11-22
Payer: COMMERCIAL

## 2020-11-22 PROCEDURE — 99442: CPT

## 2020-11-23 ENCOUNTER — LABORATORY RESULT (OUTPATIENT)
Age: 50
End: 2020-11-23

## 2020-11-24 ENCOUNTER — APPOINTMENT (OUTPATIENT)
Dept: HEMATOLOGY ONCOLOGY | Facility: CLINIC | Age: 50
End: 2020-11-24
Payer: COMMERCIAL

## 2020-11-24 DIAGNOSIS — R31.0 GROSS HEMATURIA: ICD-10-CM

## 2020-11-24 LAB
ALBUMIN SERPL ELPH-MCNC: 4.8 G/DL
ALP BLD-CCNC: 78 U/L
ALT SERPL-CCNC: 22 U/L
ANION GAP SERPL CALC-SCNC: 13 MMOL/L
APPEARANCE: CLEAR
AST SERPL-CCNC: 22 U/L
BACTERIA UR CULT: NORMAL
BACTERIA: NEGATIVE
BASOPHILS # BLD AUTO: 0.1 K/UL
BASOPHILS NFR BLD AUTO: 0.1 %
BILIRUB SERPL-MCNC: 0.7 MG/DL
BILIRUBIN URINE: NEGATIVE
BLOOD URINE: ABNORMAL
BUN SERPL-MCNC: 17 MG/DL
CALCIUM SERPL-MCNC: 9.7 MG/DL
CHLORIDE SERPL-SCNC: 100 MMOL/L
CO2 SERPL-SCNC: 26 MMOL/L
COLOR: NORMAL
CREAT SERPL-MCNC: 1.02 MG/DL
EOSINOPHIL # BLD AUTO: 0.12 K/UL
EOSINOPHIL NFR BLD AUTO: 0.1 %
GLUCOSE QUALITATIVE U: NEGATIVE
GLUCOSE SERPL-MCNC: 83 MG/DL
HCT VFR BLD CALC: 48.8 %
HGB BLD-MCNC: 13.6 G/DL
HYALINE CASTS: 1 /LPF
IMM GRANULOCYTES NFR BLD AUTO: 0.2 %
KETONES URINE: NEGATIVE
LDH SERPL-CCNC: 147 U/L
LEUKOCYTE ESTERASE URINE: NEGATIVE
LYMPHOCYTES # BLD AUTO: 181.44 K/UL
LYMPHOCYTES NFR BLD AUTO: 95.8 %
MAGNESIUM SERPL-MCNC: 1.9 MG/DL
MAN DIFF?: NORMAL
MCHC RBC-ENTMCNC: 25.3 PG
MCHC RBC-ENTMCNC: 27.9 GM/DL
MCV RBC AUTO: 90.9 FL
MICROSCOPIC-UA: NORMAL
MONOCYTES # BLD AUTO: 1.16 K/UL
MONOCYTES NFR BLD AUTO: 0.6 %
NEUTROPHILS # BLD AUTO: 6.33 K/UL
NEUTROPHILS NFR BLD AUTO: 3.2 %
NITRITE URINE: NEGATIVE
PH URINE: 5.5
PHOSPHATE SERPL-MCNC: 4.3 MG/DL
PLATELET # BLD AUTO: 184 K/UL
POTASSIUM SERPL-SCNC: 4.1 MMOL/L
PROT SERPL-MCNC: 6.9 G/DL
PROTEIN URINE: NEGATIVE
RBC # BLD: 5.37 M/UL
RBC # FLD: 14.3 %
RED BLOOD CELLS URINE: 138 /HPF
SODIUM SERPL-SCNC: 138 MMOL/L
SPECIFIC GRAVITY URINE: 1.01
SQUAMOUS EPITHELIAL CELLS: 0 /HPF
URATE SERPL-MCNC: 5.5 MG/DL
UROBILINOGEN URINE: NORMAL
WBC # FLD AUTO: 189.45 K/UL
WHITE BLOOD CELLS URINE: 4 /HPF

## 2020-11-24 PROCEDURE — 99442: CPT

## 2020-11-29 ENCOUNTER — NON-APPOINTMENT (OUTPATIENT)
Age: 50
End: 2020-11-29

## 2020-11-29 ENCOUNTER — APPOINTMENT (OUTPATIENT)
Dept: HEMATOLOGY ONCOLOGY | Facility: CLINIC | Age: 50
End: 2020-11-29
Payer: COMMERCIAL

## 2020-11-29 PROCEDURE — 99215 OFFICE O/P EST HI 40 MIN: CPT | Mod: 95

## 2020-11-30 ENCOUNTER — RX RENEWAL (OUTPATIENT)
Age: 50
End: 2020-11-30

## 2020-11-30 ENCOUNTER — LABORATORY RESULT (OUTPATIENT)
Age: 50
End: 2020-11-30

## 2020-12-01 LAB
ALBUMIN SERPL ELPH-MCNC: 4.7 G/DL
ALP BLD-CCNC: 109 U/L
ALT SERPL-CCNC: 34 U/L
ANION GAP SERPL CALC-SCNC: 12 MMOL/L
APPEARANCE: CLEAR
AST SERPL-CCNC: 35 U/L
BACTERIA: NEGATIVE
BASOPHILS # BLD AUTO: 0.11 K/UL
BASOPHILS NFR BLD AUTO: 0.1 %
BILIRUB SERPL-MCNC: 0.5 MG/DL
BILIRUBIN URINE: NEGATIVE
BLOOD URINE: ABNORMAL
BUN SERPL-MCNC: 15 MG/DL
CALCIUM SERPL-MCNC: 9.4 MG/DL
CHLORIDE SERPL-SCNC: 101 MMOL/L
CO2 SERPL-SCNC: 26 MMOL/L
COLOR: NORMAL
CREAT SERPL-MCNC: 1.03 MG/DL
EOSINOPHIL # BLD AUTO: 0.32 K/UL
EOSINOPHIL NFR BLD AUTO: 0.4 %
GLUCOSE QUALITATIVE U: NEGATIVE
GLUCOSE SERPL-MCNC: 85 MG/DL
HCT VFR BLD CALC: 45.9 %
HGB BLD-MCNC: 13.9 G/DL
HYALINE CASTS: 0 /LPF
IMM GRANULOCYTES NFR BLD AUTO: 0.2 %
KETONES URINE: NEGATIVE
LEUKOCYTE ESTERASE URINE: NEGATIVE
LYMPHOCYTES # BLD AUTO: 78.91 K/UL
LYMPHOCYTES NFR BLD AUTO: 87.1 %
MAN DIFF?: NORMAL
MCHC RBC-ENTMCNC: 26.4 PG
MCHC RBC-ENTMCNC: 30.3 GM/DL
MCV RBC AUTO: 87.3 FL
MICROSCOPIC-UA: NORMAL
MONOCYTES # BLD AUTO: 5 K/UL
MONOCYTES NFR BLD AUTO: 5.5 %
NEUTROPHILS # BLD AUTO: 6.04 K/UL
NEUTROPHILS NFR BLD AUTO: 6.7 %
NITRITE URINE: NEGATIVE
PH URINE: 6
PLATELET # BLD AUTO: 120 K/UL
POTASSIUM SERPL-SCNC: 4 MMOL/L
PROT SERPL-MCNC: 6.7 G/DL
PROTEIN URINE: NEGATIVE
RBC # BLD: 5.26 M/UL
RBC # FLD: 13.2 %
RED BLOOD CELLS URINE: 1 /HPF
SODIUM SERPL-SCNC: 139 MMOL/L
SPECIFIC GRAVITY URINE: 1.01
SQUAMOUS EPITHELIAL CELLS: 0 /HPF
UROBILINOGEN URINE: NORMAL
WBC # FLD AUTO: 90.56 K/UL
WHITE BLOOD CELLS URINE: 0 /HPF

## 2020-12-02 ENCOUNTER — APPOINTMENT (OUTPATIENT)
Dept: HEMATOLOGY ONCOLOGY | Facility: CLINIC | Age: 50
End: 2020-12-02
Payer: COMMERCIAL

## 2020-12-02 LAB — SARS-COV-2 N GENE NPH QL NAA+PROBE: NOT DETECTED

## 2020-12-02 PROCEDURE — 99442: CPT

## 2020-12-09 LAB — HLX UV FISH FINAL REPORT: NORMAL

## 2020-12-21 ENCOUNTER — LABORATORY RESULT (OUTPATIENT)
Age: 50
End: 2020-12-21

## 2020-12-21 ENCOUNTER — NON-APPOINTMENT (OUTPATIENT)
Age: 50
End: 2020-12-21

## 2020-12-23 ENCOUNTER — APPOINTMENT (OUTPATIENT)
Dept: HEMATOLOGY ONCOLOGY | Facility: CLINIC | Age: 50
End: 2020-12-23
Payer: COMMERCIAL

## 2020-12-23 DIAGNOSIS — R19.7 DIARRHEA, UNSPECIFIED: ICD-10-CM

## 2020-12-23 LAB
ALBUMIN SERPL ELPH-MCNC: 4.9 G/DL
ALP BLD-CCNC: 92 U/L
ALT SERPL-CCNC: 24 U/L
ANION GAP SERPL CALC-SCNC: 11 MMOL/L
AST SERPL-CCNC: 28 U/L
BASOPHILS # BLD AUTO: 0 K/UL
BASOPHILS NFR BLD AUTO: 0 %
BILIRUB SERPL-MCNC: 1 MG/DL
BUN SERPL-MCNC: 14 MG/DL
CALCIUM SERPL-MCNC: 9.5 MG/DL
CHLORIDE SERPL-SCNC: 99 MMOL/L
CO2 SERPL-SCNC: 26 MMOL/L
CREAT SERPL-MCNC: 1.24 MG/DL
EOSINOPHIL # BLD AUTO: 0.53 K/UL
EOSINOPHIL NFR BLD AUTO: 1 %
GLUCOSE SERPL-MCNC: 93 MG/DL
HCT VFR BLD CALC: 45 %
HGB BLD-MCNC: 14 G/DL
LDH SERPL-CCNC: 170 U/L
LYMPHOCYTES # BLD AUTO: 41.95 K/UL
LYMPHOCYTES NFR BLD AUTO: 79 %
MAGNESIUM SERPL-MCNC: 2.1 MG/DL
MAN DIFF?: NORMAL
MCHC RBC-ENTMCNC: 26.2 PG
MCHC RBC-ENTMCNC: 31.1 GM/DL
MCV RBC AUTO: 84.1 FL
MONOCYTES # BLD AUTO: 2.66 K/UL
MONOCYTES NFR BLD AUTO: 5 %
NEUTROPHILS # BLD AUTO: 5.84 K/UL
NEUTROPHILS NFR BLD AUTO: 11 %
PHOSPHATE SERPL-MCNC: 4.1 MG/DL
PLATELET # BLD AUTO: 127 K/UL
POTASSIUM SERPL-SCNC: 4.2 MMOL/L
PROT SERPL-MCNC: 7 G/DL
RBC # BLD: 5.35 M/UL
RBC # FLD: 13.3 %
SODIUM SERPL-SCNC: 136 MMOL/L
URATE SERPL-MCNC: 6.2 MG/DL
WBC # FLD AUTO: 53.1 K/UL

## 2020-12-23 PROCEDURE — 99215 OFFICE O/P EST HI 40 MIN: CPT | Mod: 95

## 2020-12-23 NOTE — HISTORY OF PRESENT ILLNESS
[Disease:__________________________] : Disease: [unfilled] [Other Location: e.g. School (Enter Location, City,State)___] : at [unfilled], at the time of the visit. [Medical Office: ([facility])___] : at the medical office located in  [Patient] : the patient [Self] : self [de-identified] : Mr. Hewitt is a a 50 year old male with CLL (dx 2006), who presents for follow-up after starting on ibrutinib on Monday June 22, 2020. \par Since his last visit, the patient has continued to be off ibrutinib without new issues. He denies any new dysuria, hematuria, frequency, or  burning when he urinates.\par We reviewed his CLL labs, all stable despite being off the drug. Since no more issues, we will repeat all labs on Monday and include UA and UCx, if all ok, will resume ibrutinib as we know that the disease will come back without it. [de-identified] : CLL Markers:\par normal FISH\par CD38+\par unmutated.

## 2020-12-23 NOTE — ASSESSMENT
[FreeTextEntry1] : Mr. Hewitt is a 50 year old male CLL (unmutated IGHV), started ibrutinib June 22, 2020, briefly on hold due to  gross painless hematuria, likely related to ibrutinib. We will repeat UA on Monday and if no longer blood present, will resume drug next week.\par \par Plan:\par 1)CLL: continue to hold ibrutinib as we are waiting for further work-up of the gross hematuria. Last Hgb still wnl.\par 2)Hypogammaglobulinemia: continue home IVIG\par 3): repeat UA, if ongoing gross hematuria, will need  referral since he has been off the drug for 1 month.\par 4)continue routine health maintenance and age appropriate screening as indicated. Reminded patient to f/u with internist for regular follow-up.\par 5)RTO next week

## 2020-12-23 NOTE — PHYSICAL EXAM
[Fully active, able to carry on all pre-disease performance without restriction] : Status 0 - Fully active, able to carry on all pre-disease performance without restriction [Normal] : affect appropriate [de-identified] : no distress, speaking in full sentences [de-identified] : aox3

## 2020-12-29 ENCOUNTER — LABORATORY RESULT (OUTPATIENT)
Age: 50
End: 2020-12-29

## 2020-12-30 ENCOUNTER — APPOINTMENT (OUTPATIENT)
Dept: HEMATOLOGY ONCOLOGY | Facility: CLINIC | Age: 50
End: 2020-12-30
Payer: COMMERCIAL

## 2020-12-30 ENCOUNTER — OUTPATIENT (OUTPATIENT)
Dept: OUTPATIENT SERVICES | Facility: HOSPITAL | Age: 50
LOS: 1 days | Discharge: HOME | End: 2020-12-30

## 2020-12-30 DIAGNOSIS — D64.9 ANEMIA, UNSPECIFIED: ICD-10-CM

## 2020-12-30 LAB
ALBUMIN SERPL ELPH-MCNC: 4.6 G/DL
ALP BLD-CCNC: 76 U/L
ALT SERPL-CCNC: 31 U/L
ANION GAP SERPL CALC-SCNC: 11 MMOL/L
AST SERPL-CCNC: 28 U/L
BASOPHILS # BLD AUTO: 0.39 K/UL
BASOPHILS NFR BLD AUTO: 1 %
BILIRUB SERPL-MCNC: 0.7 MG/DL
BUN SERPL-MCNC: 20 MG/DL
CALCIUM SERPL-MCNC: 9.1 MG/DL
CHLORIDE SERPL-SCNC: 104 MMOL/L
CO2 SERPL-SCNC: 26 MMOL/L
CREAT SERPL-MCNC: 1.06 MG/DL
EOSINOPHIL # BLD AUTO: 0 K/UL
EOSINOPHIL NFR BLD AUTO: 0 %
GLUCOSE SERPL-MCNC: 77 MG/DL
HCT VFR BLD CALC: 41.2 %
HGB BLD-MCNC: 12.9 G/DL
LYMPHOCYTES # BLD AUTO: 31.58 K/UL
LYMPHOCYTES NFR BLD AUTO: 81 %
MAN DIFF?: NORMAL
MCHC RBC-ENTMCNC: 26.7 PG
MCHC RBC-ENTMCNC: 31.3 GM/DL
MCV RBC AUTO: 85.3 FL
MONOCYTES # BLD AUTO: 0.39 K/UL
MONOCYTES NFR BLD AUTO: 1 %
NEUTROPHILS # BLD AUTO: 3.9 K/UL
NEUTROPHILS NFR BLD AUTO: 10 %
PLATELET # BLD AUTO: 70 K/UL
POTASSIUM SERPL-SCNC: 4.4 MMOL/L
PROT SERPL-MCNC: 6.3 G/DL
RBC # BLD: 4.83 M/UL
RBC # FLD: 13.6 %
SODIUM SERPL-SCNC: 141 MMOL/L
WBC # FLD AUTO: 38.99 K/UL

## 2020-12-30 PROCEDURE — 99215 OFFICE O/P EST HI 40 MIN: CPT | Mod: 95

## 2020-12-31 LAB — BACTERIA UR CULT: NORMAL

## 2020-12-31 NOTE — PHYSICAL EXAM
[Fully active, able to carry on all pre-disease performance without restriction] : Status 0 - Fully active, able to carry on all pre-disease performance without restriction [Normal] : affect appropriate [de-identified] : no distress, speaking in full sentences [de-identified] : aox3

## 2020-12-31 NOTE — HISTORY OF PRESENT ILLNESS
[Disease:__________________________] : Disease: [unfilled] [Medical Office: ([facility])___] : at the medical office located in  [Patient] : the patient [Self] : self [Home] : at home, [unfilled] , at the time of the visit. [Spouse] : spouse [de-identified] : Mr. Hewitt is a a 50 year old male with CLL (dx 2006), who presents for follow-up. He was on ibrutinib (started on Monday June 22, 2020) but this has been on hold since he developed painless gross hematuria, likely related to ibrutinib use as all work-up was negative. He has not had any more episodes. He denies any new dysuria, hematuria, frequency, or  burning when he urinates.\par We reviewed his CLL labs, he has slightly lower Hgb and plt since last visit. We had ordered a repeat UA, but only a UCx was processed. \par Since no more issues, we will resume ibrutinib at full dose as of today since he has been asymptomatic. ROS is essentially negative except for slightly more tiredness, which could be attributed to the lowe Hgb. [de-identified] : CLL Markers:\par normal FISH\par CD38+\par unmutated.

## 2020-12-31 NOTE — ASSESSMENT
[FreeTextEntry1] : Mr. Hewitt is a 50 year old male CLL (unmutated IGHV), started ibrutinib June 22, 2020, briefly on hold due to  gross painless hematuria, likely related to ibrutinib. \par We had planned to resume the drug this week, we were expecting a repeat UA but only UCx available. we called the lab to see if this can be added, since it wasn't processed, but if not possible, still it is reasonable to resume as patient is not having any symptoms.\par \par Plan:\par 1)CLL: resume ibrutinib starting tomorrow.\par 2)Hypogammaglobulinemia: continue home IVIG\par 3): repeat UA if it happens again, we can always titrate dose down.\par 4)continue routine health maintenance and age appropriate screening as indicated. Reminded patient to f/u with internist for regular follow-up.\par 5)RTO next week

## 2021-01-04 LAB
LDH SERPL-CCNC: 154 U/L
MAGNESIUM SERPL-MCNC: 1.8 MG/DL
PHOSPHATE SERPL-MCNC: 4 MG/DL
URATE SERPL-MCNC: 4.7 MG/DL

## 2021-01-11 ENCOUNTER — LABORATORY RESULT (OUTPATIENT)
Age: 51
End: 2021-01-11

## 2021-01-12 LAB
ALBUMIN SERPL ELPH-MCNC: 5 G/DL
ALP BLD-CCNC: 77 U/L
ALT SERPL-CCNC: 15 U/L
ANION GAP SERPL CALC-SCNC: 13 MMOL/L
APPEARANCE: CLEAR
AST SERPL-CCNC: 20 U/L
BACTERIA: NEGATIVE
BASOPHILS # BLD AUTO: 0.87 K/UL
BASOPHILS NFR BLD AUTO: 0.9 %
BILIRUB SERPL-MCNC: 0.9 MG/DL
BILIRUBIN URINE: NEGATIVE
BLOOD URINE: ABNORMAL
BUN SERPL-MCNC: 16 MG/DL
CALCIUM SERPL-MCNC: 9.7 MG/DL
CHLORIDE SERPL-SCNC: 101 MMOL/L
CO2 SERPL-SCNC: 27 MMOL/L
COLOR: YELLOW
CREAT SERPL-MCNC: 1.22 MG/DL
EOSINOPHIL # BLD AUTO: 0 K/UL
EOSINOPHIL NFR BLD AUTO: 0 %
GLUCOSE QUALITATIVE U: NEGATIVE
GLUCOSE SERPL-MCNC: 109 MG/DL
HCT VFR BLD CALC: 44.1 %
HGB BLD-MCNC: 13.3 G/DL
HYALINE CASTS: 0 /LPF
KETONES URINE: NEGATIVE
LDH SERPL-CCNC: 144 U/L
LEUKOCYTE ESTERASE URINE: NEGATIVE
LYMPHOCYTES # BLD AUTO: 90.3 K/UL
LYMPHOCYTES NFR BLD AUTO: 93 %
MAGNESIUM SERPL-MCNC: 2 MG/DL
MAN DIFF?: NORMAL
MCHC RBC-ENTMCNC: 26.4 PG
MCHC RBC-ENTMCNC: 30.2 GM/DL
MCV RBC AUTO: 87.5 FL
MICROSCOPIC-UA: NORMAL
MONOCYTES # BLD AUTO: 0.87 K/UL
MONOCYTES NFR BLD AUTO: 0.9 %
NEUTROPHILS # BLD AUTO: 5.05 K/UL
NEUTROPHILS NFR BLD AUTO: 5.2 %
NITRITE URINE: NEGATIVE
PH URINE: 6
PHOSPHATE SERPL-MCNC: 4.2 MG/DL
PLATELET # BLD AUTO: 163 K/UL
POTASSIUM SERPL-SCNC: 4.9 MMOL/L
PROT SERPL-MCNC: 6.7 G/DL
PROTEIN URINE: NORMAL
RBC # BLD: 5.04 M/UL
RBC # FLD: 14.2 %
RED BLOOD CELLS URINE: 14 /HPF
SODIUM SERPL-SCNC: 140 MMOL/L
SPECIFIC GRAVITY URINE: 1.03
SQUAMOUS EPITHELIAL CELLS: 0 /HPF
URATE SERPL-MCNC: 5.3 MG/DL
UROBILINOGEN URINE: ABNORMAL
WBC # FLD AUTO: 97.1 K/UL
WHITE BLOOD CELLS URINE: 0 /HPF

## 2021-01-13 ENCOUNTER — APPOINTMENT (OUTPATIENT)
Dept: HEMATOLOGY ONCOLOGY | Facility: CLINIC | Age: 51
End: 2021-01-13
Payer: COMMERCIAL

## 2021-01-13 PROCEDURE — 99215 OFFICE O/P EST HI 40 MIN: CPT | Mod: 95

## 2021-01-18 ENCOUNTER — RX RENEWAL (OUTPATIENT)
Age: 51
End: 2021-01-18

## 2021-01-20 NOTE — HISTORY OF PRESENT ILLNESS
[Disease:__________________________] : Disease: [unfilled] [Home] : at home, [unfilled] , at the time of the visit. [Medical Office: ([facility])___] : at the medical office located in  [Patient] : the patient [Self] : self [de-identified] : Mr. Hewitt is a a 50 year old male with CLL (dx 2006), who presents for follow-up.\par He responded well to ibrutinib (started on Monday June 22, 2020) but was on hold due to painless gross hematuria, likely related to ibrutinib use as all work-up was negative. \par While off therapy, he became anemic and thrombocytopenic, so a decision was made to resume ibrutinib 2 weeks ago.\par He has not had any more episodes where he notes blood in the urine. He denies any new dysuria, hematuria, frequency, or  burning when he urinates.\par We retested a UA along with CLL labs and found that he has again hematuria, despite him being asymptomatic. Because of the recurrence, we need to have him see  for further work-up and evaluation. He is in agreement, he is scheduled for cytoscopy end of the month. I recommended him stopping the ibrutinib pre-procedure as there is a concern for potential bleeding post procedure, but patient prefers to not stop as he gets severe headaches while off the drug.\par His Hgb and plt have improved significantly since resuming the ibrutinib. [de-identified] : CLL Markers:\par normal FISH\par CD38+\par unmutated.

## 2021-01-20 NOTE — ASSESSMENT
[FreeTextEntry1] : Mr. Hewitt is a 50 year old male CLL (unmutated IGHV), started ibrutinib June 22, 2020, briefly on hold due to  gross painless hematuria, likely related to ibrutinib. Upon restarting the drug, his hematuria has returned (microscopic) without symptoms, so will request  eval to rule out other potential etiologies.\par \par Plan:\par 1)CLL: continue ibrutinib. Advised patient to stop ibrutinib 3 days before and after cystoscopy, but patient is concerned about symptoms when he gets off the drug.\par 2)Hypogammaglobulinemia: continue home IVIG\par 3): awaiting further w/u\par 4)continue routine health maintenance and age appropriate screening as indicated. Reminded patient to f/u with internist for regular follow-up.\par 5)RTO in 2 weeks

## 2021-01-20 NOTE — PHYSICAL EXAM
[Fully active, able to carry on all pre-disease performance without restriction] : Status 0 - Fully active, able to carry on all pre-disease performance without restriction [Normal] : affect appropriate [de-identified] : no distress, speaking in full sentences [de-identified] : aox3

## 2021-01-28 ENCOUNTER — APPOINTMENT (OUTPATIENT)
Dept: UROLOGY | Facility: CLINIC | Age: 51
End: 2021-01-28
Payer: COMMERCIAL

## 2021-01-28 ENCOUNTER — OUTPATIENT (OUTPATIENT)
Dept: OUTPATIENT SERVICES | Facility: HOSPITAL | Age: 51
LOS: 1 days | End: 2021-01-28
Payer: COMMERCIAL

## 2021-01-28 VITALS
TEMPERATURE: 97.7 F | RESPIRATION RATE: 16 BRPM | HEART RATE: 61 BPM | SYSTOLIC BLOOD PRESSURE: 136 MMHG | DIASTOLIC BLOOD PRESSURE: 81 MMHG

## 2021-01-28 DIAGNOSIS — R35.0 FREQUENCY OF MICTURITION: ICD-10-CM

## 2021-01-28 PROCEDURE — 99203 OFFICE O/P NEW LOW 30 MIN: CPT | Mod: 25

## 2021-01-28 PROCEDURE — 52000 CYSTOURETHROSCOPY: CPT

## 2021-01-28 PROCEDURE — 88112 CYTOPATH CELL ENHANCE TECH: CPT | Mod: 26

## 2021-01-28 PROCEDURE — 99072 ADDL SUPL MATRL&STAF TM PHE: CPT

## 2021-02-01 LAB — URINE CYTOLOGY: NORMAL

## 2021-02-07 NOTE — LETTER BODY
[Dear  ___] : Dear  [unfilled], [FreeTextEntry1] : I had the pleasure of seeing your patient, MINA PERERA, in the office today.  \par \par Please see my office note below.\par \par Thank you for allowing me to participate in his care and please do not hesitate to contact me with any questions or concerns regarding his care.\par \par Sincerely,\par \par Conner Lynch MD\par Chief of Urology, [facility]\par  of Urology\par [address] [place] [address], [place] [address]\[place] [address]\par PH:      [phone]\par Email:  [email or web address]

## 2021-02-07 NOTE — PHYSICAL EXAM
[General Appearance - Well Developed] : well developed [General Appearance - Well Nourished] : well nourished [Normal Appearance] : normal appearance [Well Groomed] : well groomed [General Appearance - In No Acute Distress] : no acute distress [Edema] : no peripheral edema [] : no respiratory distress [Respiration, Rhythm And Depth] : normal respiratory rhythm and effort [Exaggerated Use Of Accessory Muscles For Inspiration] : no accessory muscle use [Abdomen Soft] : soft [Abdomen Tenderness] : non-tender [Costovertebral Angle Tenderness] : no ~M costovertebral angle tenderness [Urethral Meatus] : meatus normal [Penis Abnormality] : normal circumcised penis [Urinary Bladder Findings] : the bladder was normal on palpation

## 2021-02-07 NOTE — HISTORY OF PRESENT ILLNESS
[FreeTextEntry1] : Currently on treatment for CLL with ibrutinib.\par Recent UA showed microscopic hematuria.  \par \par Patient denies gross hematuria.  No abdominal or flank pain.  No bothersome urinary symptoms.\par No prior  surgery.  No h/o kidney stones.\par \par Had abdominal imaging 2019 including CT and MRI which did not show renal abnormalities.\par \par PSA Aug 2020: 1.31 ng/mL\par UA 1/11/2021: 14 RBC/hpf\par Cr 1/11/2021: 1.22 mg/dL.

## 2021-02-07 NOTE — ASSESSMENT
[FreeTextEntry1] : Microscopic hematuria:  discussed differential diagnosis including BPH, malignancy, kidney stones.\par Recommend office cystoscopy to evaluate the bladder.\par \par Cystoscopy today: no mucosal abnormalities.  BPH but minimal symptoms.\par Urine cytology sent.\par \par Recommend Renal Ultrasound to evaluate upper tracts.

## 2021-02-10 DIAGNOSIS — R31.21 ASYMPTOMATIC MICROSCOPIC HEMATURIA: ICD-10-CM

## 2021-02-13 ENCOUNTER — OUTPATIENT (OUTPATIENT)
Dept: OUTPATIENT SERVICES | Facility: HOSPITAL | Age: 51
LOS: 1 days | End: 2021-02-13
Payer: COMMERCIAL

## 2021-02-13 ENCOUNTER — APPOINTMENT (OUTPATIENT)
Dept: ULTRASOUND IMAGING | Facility: CLINIC | Age: 51
End: 2021-02-13
Payer: COMMERCIAL

## 2021-02-13 DIAGNOSIS — Z00.8 ENCOUNTER FOR OTHER GENERAL EXAMINATION: ICD-10-CM

## 2021-02-13 PROCEDURE — 76770 US EXAM ABDO BACK WALL COMP: CPT

## 2021-02-13 PROCEDURE — 76770 US EXAM ABDO BACK WALL COMP: CPT | Mod: 26

## 2021-02-23 ENCOUNTER — APPOINTMENT (OUTPATIENT)
Dept: HEMATOLOGY ONCOLOGY | Facility: CLINIC | Age: 51
End: 2021-02-23
Payer: COMMERCIAL

## 2021-02-23 VITALS
RESPIRATION RATE: 16 BRPM | SYSTOLIC BLOOD PRESSURE: 110 MMHG | WEIGHT: 184 LBS | TEMPERATURE: 98 F | BODY MASS INDEX: 24.95 KG/M2 | HEART RATE: 65 BPM | DIASTOLIC BLOOD PRESSURE: 80 MMHG | OXYGEN SATURATION: 98 %

## 2021-02-23 PROCEDURE — 99215 OFFICE O/P EST HI 40 MIN: CPT

## 2021-02-23 PROCEDURE — 99072 ADDL SUPL MATRL&STAF TM PHE: CPT

## 2021-02-23 PROCEDURE — 85025 COMPLETE CBC W/AUTO DIFF WBC: CPT

## 2021-02-23 NOTE — PHYSICAL EXAM
[Fully active, able to carry on all pre-disease performance without restriction] : Status 0 - Fully active, able to carry on all pre-disease performance without restriction [Normal] : no peripheral adenopathy appreciated [de-identified] : no distress, speaking in full sentences [de-identified] : right posterior flank (lumbar area) petechiae, bilateral lower leg-ankle petechiae [de-identified] : aox3

## 2021-02-23 NOTE — ASSESSMENT
[FreeTextEntry1] : Mr. Hewitt is a 50 year old male CLL (unmutated IGHV), started ibrutinib June 22, 2020, briefly on hold due to  gross painless hematuria, likely related to ibrutinib. Upon restarting the drug, his hematuria has returned (microscopic) without symptoms, so patient was seen by  and underwent cystoscopy--he was told all ok.\par \par Plan:\par 1)CLL: continue ibrutinib. His H/H and PLT are in normal range, but he was found to have scattered petechiae mostly on bilateral lower legs and right flank on today's physical exam. Explained him that although his PLT count is normal, they are dysfunctional due to ibrutinib, so he needs to take precaution for getting injury and needs to speak to the physicians before any invasive procedure. He will need platelet transfusions before any urgent surgical/invasive procedures.\par 2)Hypogammaglobulinemia: continue home IVIG\par 3): Enlarged prostate but cystoscopy was grossly normal. Will repeat UA today.\par 4)continue routine health maintenance and age appropriate screening as indicated. Reminded patient to f/u with internist for regular follow-up. Received his first COVID injection on 2/15/21. Colonoscopy up to date.\par 5)RTO in 6 weeks, TEB.

## 2021-02-23 NOTE — HISTORY OF PRESENT ILLNESS
[Disease:__________________________] : Disease: [unfilled] [de-identified] : Mr. Hewitt is a a 50 year old male with CLL (dx 2006), who presents for follow-up.\par He responded well to ibrutinib (started on Monday June 22, 2020) but was on hold due to painless gross hematuria, likely related to ibrutinib use as all work-up was negative. \par While off therapy, he became anemic and thrombocytopenic, so a decision was made to resume ibrutinib 2 weeks ago.\par He has not had any more episodes where he notes blood in the urine. He denies any new dysuria, hematuria, frequency, or  burning when he urinates.\par We retested a UA along with CLL labs and found that he has again hematuria, despite him being asymptomatic. Because of the recurrence, we need to have him see  for further work-up and evaluation. He is in agreement, he is scheduled for cytoscopy end of the month. I recommended him stopping the ibrutinib pre-procedure as there is a concern for potential bleeding post procedure, but patient prefers to not stop as he gets severe headaches while off the drug.\par His Hgb and plt have improved significantly since resuming the ibrutinib. [de-identified] : CLL Markers:\par normal FISH\par CD38+\par unmutated.  [de-identified] : Patient presents to the office for follow-up. Doing well, seen by urologist Dr. Lynch for microscopic hematuria and underwent cystoscopy. No significant cystoscopic findings but enlarged prostate. Denies painful urination, nausea, vomiting, or or any other symptoms. Received IVIG on Sunday, also received the first shot of Pfizer COVID vaccine on 2/15/21, next one scheduled on 3/8/21.

## 2021-02-24 LAB
ALBUMIN SERPL ELPH-MCNC: 4.4 G/DL
ALP BLD-CCNC: 64 U/L
ALT SERPL-CCNC: 14 U/L
ANION GAP SERPL CALC-SCNC: 11 MMOL/L
APPEARANCE: CLEAR
AST SERPL-CCNC: 17 U/L
BACTERIA: NEGATIVE
BILIRUB SERPL-MCNC: 0.6 MG/DL
BILIRUBIN URINE: NEGATIVE
BLOOD URINE: NEGATIVE
BUN SERPL-MCNC: 18 MG/DL
CALCIUM SERPL-MCNC: 9.3 MG/DL
CHLORIDE SERPL-SCNC: 101 MMOL/L
CO2 SERPL-SCNC: 26 MMOL/L
COLOR: YELLOW
CREAT SERPL-MCNC: 1.23 MG/DL
GLUCOSE QUALITATIVE U: NEGATIVE
GLUCOSE SERPL-MCNC: 71 MG/DL
HYALINE CASTS: 0 /LPF
KETONES URINE: NEGATIVE
LDH SERPL-CCNC: 121 U/L
LEUKOCYTE ESTERASE URINE: NEGATIVE
MAGNESIUM SERPL-MCNC: 1.8 MG/DL
MICROSCOPIC-UA: NORMAL
NITRITE URINE: NEGATIVE
PH URINE: 5.5
PHOSPHATE SERPL-MCNC: 3.8 MG/DL
POTASSIUM SERPL-SCNC: 4.8 MMOL/L
PROT SERPL-MCNC: 6.3 G/DL
PROTEIN URINE: NORMAL
RED BLOOD CELLS URINE: 2 /HPF
SODIUM SERPL-SCNC: 138 MMOL/L
SPECIFIC GRAVITY URINE: 1.03
SQUAMOUS EPITHELIAL CELLS: 0 /HPF
URATE SERPL-MCNC: 5.3 MG/DL
UROBILINOGEN URINE: NORMAL
WHITE BLOOD CELLS URINE: 0 /HPF

## 2021-02-28 ENCOUNTER — RX RENEWAL (OUTPATIENT)
Age: 51
End: 2021-02-28

## 2021-03-16 ENCOUNTER — APPOINTMENT (OUTPATIENT)
Dept: INTERNAL MEDICINE | Facility: CLINIC | Age: 51
End: 2021-03-16

## 2021-03-19 ENCOUNTER — RX RENEWAL (OUTPATIENT)
Age: 51
End: 2021-03-19

## 2021-03-28 ENCOUNTER — RX RENEWAL (OUTPATIENT)
Age: 51
End: 2021-03-28

## 2021-04-06 ENCOUNTER — LABORATORY RESULT (OUTPATIENT)
Age: 51
End: 2021-04-06

## 2021-04-07 ENCOUNTER — APPOINTMENT (OUTPATIENT)
Dept: HEMATOLOGY ONCOLOGY | Facility: CLINIC | Age: 51
End: 2021-04-07
Payer: COMMERCIAL

## 2021-04-07 LAB
ALBUMIN SERPL ELPH-MCNC: 4.7 G/DL
ALP BLD-CCNC: 71 U/L
ALT SERPL-CCNC: 15 U/L
ANION GAP SERPL CALC-SCNC: 11 MMOL/L
AST SERPL-CCNC: 15 U/L
BASOPHILS # BLD AUTO: 0.13 K/UL
BASOPHILS NFR BLD AUTO: 0.1 %
BILIRUB SERPL-MCNC: 0.8 MG/DL
BUN SERPL-MCNC: 17 MG/DL
CALCIUM SERPL-MCNC: 9.5 MG/DL
CHLORIDE SERPL-SCNC: 102 MMOL/L
CO2 SERPL-SCNC: 25 MMOL/L
CREAT SERPL-MCNC: 1.03 MG/DL
EOSINOPHIL # BLD AUTO: 0.08 K/UL
EOSINOPHIL NFR BLD AUTO: 0.1 %
GLUCOSE SERPL-MCNC: 81 MG/DL
HCT VFR BLD CALC: 45.7 %
HGB BLD-MCNC: 13.6 G/DL
IMM GRANULOCYTES NFR BLD AUTO: 0.2 %
LDH SERPL-CCNC: 128 U/L
LYMPHOCYTES # BLD AUTO: 119.18 K/UL
LYMPHOCYTES NFR BLD AUTO: 93.8 %
MAGNESIUM SERPL-MCNC: 1.9 MG/DL
MAN DIFF?: NORMAL
MCHC RBC-ENTMCNC: 26.6 PG
MCHC RBC-ENTMCNC: 29.8 GM/DL
MCV RBC AUTO: 89.4 FL
MONOCYTES # BLD AUTO: 1.16 K/UL
MONOCYTES NFR BLD AUTO: 0.9 %
NEUTROPHILS # BLD AUTO: 6.33 K/UL
NEUTROPHILS NFR BLD AUTO: 4.9 %
PHOSPHATE SERPL-MCNC: 4.2 MG/DL
PLATELET # BLD AUTO: 154 K/UL
POTASSIUM SERPL-SCNC: 4.3 MMOL/L
PROT SERPL-MCNC: 6.5 G/DL
RBC # BLD: 5.11 M/UL
RBC # FLD: 13.3 %
SODIUM SERPL-SCNC: 138 MMOL/L
URATE SERPL-MCNC: 4.7 MG/DL
WBC # FLD AUTO: 127.09 K/UL

## 2021-04-07 PROCEDURE — 99215 OFFICE O/P EST HI 40 MIN: CPT | Mod: 95

## 2021-04-07 NOTE — HISTORY OF PRESENT ILLNESS
[Disease:__________________________] : Disease: [unfilled] [Home] : at home, [unfilled] , at the time of the visit. [Medical Office: ([facility])___] : at the medical office located in  [Verbal consent obtained from patient] : the patient, [unfilled] [de-identified] : Mr. Hewitt is a a 50 year old male with CLL (dx 2006), who presents for follow-up.\par He responded well to ibrutinib (started on Monday June 22, 2020) but was on hold due to painless gross hematuria, likely related to ibrutinib use as all work-up was negative. \par While off therapy, he became anemic and thrombocytopenic, so a decision was made to resume ibrutinib.\par He has not had any more episodes where he notes blood in the urine. He denies any new dysuria, hematuria, frequency, or  burning when he urinates.\par We retested a UA along with CLL labs and found that he has again hematuria, despite him being asymptomatic. Because of the recurrence, we need to have him see  for further work-up and evaluation. He had cytoscopy 1/28/21, no mucosal abnormalities. BPH but minimal symptoms. Denies painful urination, nausea, vomiting, or or any other symptoms. Received IVIG on Sunday, also received the first shot of Pfizer COVID vaccine on 2/15/21 and 3/8/21. He traveled with his family recently. He said he will test antibodies at work.\par His Hgb and plt have improved significantly since resuming the ibrutinib.   [de-identified] : CLL Markers:\par normal FISH\par CD38+\par unmutated.

## 2021-04-07 NOTE — PHYSICAL EXAM
[Fully active, able to carry on all pre-disease performance without restriction] : Status 0 - Fully active, able to carry on all pre-disease performance without restriction [Normal] : affect appropriate [de-identified] : no distress, speaking in full sentences [de-identified] : aox3

## 2021-04-07 NOTE — ASSESSMENT
[FreeTextEntry1] : Mr. Hewitt is a 50 year old male CLL (unmutated IGHV), started ibrutinib June 22, 2020, briefly on hold due to  painless hematuria, which was worked-up and has since resolved.\par \par Plan:\par 1)CLL: continue ibrutinib. Explained him that although his PLT count is normal, they are dysfunctional due to ibrutinib, so he needs to take precautions before any surgical interventions.\par 2)Hypogammaglobulinemia: continue home IVIG\par 3): Enlarged prostate, f/u w  as needed\par 4)continue routine health maintenance and age appropriate screening as indicated. Reminded patient to f/u with internist for regular follow-up. Received 2 COVID vaccines. Colonoscopy up to date.\par 5)RTO in 6-8 weeks, telehealth is ok.

## 2021-04-24 ENCOUNTER — RX RENEWAL (OUTPATIENT)
Age: 51
End: 2021-04-24

## 2021-05-14 ENCOUNTER — APPOINTMENT (OUTPATIENT)
Dept: INTERNAL MEDICINE | Facility: CLINIC | Age: 51
End: 2021-05-14

## 2021-05-25 ENCOUNTER — RX RENEWAL (OUTPATIENT)
Age: 51
End: 2021-05-25

## 2021-06-16 ENCOUNTER — RX RENEWAL (OUTPATIENT)
Age: 51
End: 2021-06-16

## 2021-06-18 ENCOUNTER — RX RENEWAL (OUTPATIENT)
Age: 51
End: 2021-06-18

## 2021-07-01 ENCOUNTER — LABORATORY RESULT (OUTPATIENT)
Age: 51
End: 2021-07-01

## 2021-07-02 LAB
ALBUMIN SERPL ELPH-MCNC: 5.2 G/DL
ALP BLD-CCNC: 67 U/L
ALT SERPL-CCNC: 21 U/L
ANION GAP SERPL CALC-SCNC: 17 MMOL/L
AST SERPL-CCNC: 18 U/L
BASOPHILS # BLD AUTO: 0 K/UL
BASOPHILS NFR BLD AUTO: 0 %
BILIRUB SERPL-MCNC: 0.9 MG/DL
BUN SERPL-MCNC: 17 MG/DL
CALCIUM SERPL-MCNC: 9.7 MG/DL
CHLORIDE SERPL-SCNC: 98 MMOL/L
CO2 SERPL-SCNC: 23 MMOL/L
CREAT SERPL-MCNC: 1.14 MG/DL
EOSINOPHIL # BLD AUTO: 0 K/UL
EOSINOPHIL NFR BLD AUTO: 0 %
GLUCOSE SERPL-MCNC: 87 MG/DL
HCT VFR BLD CALC: 48.3 %
HGB BLD-MCNC: 14.3 G/DL
LDH SERPL-CCNC: 142 U/L
LYMPHOCYTES # BLD AUTO: 88.98 K/UL
LYMPHOCYTES NFR BLD AUTO: 90.3 %
MAGNESIUM SERPL-MCNC: 1.9 MG/DL
MAN DIFF?: NORMAL
MCHC RBC-ENTMCNC: 26.6 PG
MCHC RBC-ENTMCNC: 29.6 GM/DL
MCV RBC AUTO: 89.8 FL
MONOCYTES # BLD AUTO: 1.58 K/UL
MONOCYTES NFR BLD AUTO: 1.6 %
NEUTROPHILS # BLD AUTO: 6.41 K/UL
NEUTROPHILS NFR BLD AUTO: 6.5 %
PHOSPHATE SERPL-MCNC: 4.8 MG/DL
PLATELET # BLD AUTO: 196 K/UL
POTASSIUM SERPL-SCNC: 4.1 MMOL/L
PROT SERPL-MCNC: 6.8 G/DL
RBC # BLD: 5.38 M/UL
RBC # FLD: 13.7 %
SODIUM SERPL-SCNC: 138 MMOL/L
URATE SERPL-MCNC: 5 MG/DL
WBC # FLD AUTO: 98.54 K/UL

## 2021-07-05 ENCOUNTER — RX RENEWAL (OUTPATIENT)
Age: 51
End: 2021-07-05

## 2021-07-06 ENCOUNTER — APPOINTMENT (OUTPATIENT)
Dept: HEMATOLOGY ONCOLOGY | Facility: CLINIC | Age: 51
End: 2021-07-06
Payer: COMMERCIAL

## 2021-07-06 PROCEDURE — 99215 OFFICE O/P EST HI 40 MIN: CPT | Mod: 95

## 2021-07-08 ENCOUNTER — APPOINTMENT (OUTPATIENT)
Dept: UROLOGY | Facility: CLINIC | Age: 51
End: 2021-07-08

## 2021-07-08 DIAGNOSIS — R31.21 ASYMPTOMATIC MICROSCOPIC HEMATURIA: ICD-10-CM

## 2021-07-08 NOTE — PHYSICAL EXAM
[Fully active, able to carry on all pre-disease performance without restriction] : Status 0 - Fully active, able to carry on all pre-disease performance without restriction [Normal] : affect appropriate [de-identified] : no distress, speaking in full sentences [de-identified] : aox3

## 2021-07-08 NOTE — HISTORY OF PRESENT ILLNESS
[Home] : at home, [unfilled] , at the time of the visit. [Medical Office: ([facility])___] : at the medical office located in  [Verbal consent obtained from patient] : the patient, [unfilled] [de-identified] : Mr. Hewitt is a a 50 year old male with CLL (dx 2006), who presents for follow-up.\par He responded well to ibrutinib (started on Monday June 22, 2020) but was on hold briefly due to painless gross hematuria, all work-up was negative. \par While off therapy, he became anemic and thrombocytopenic, so a decision was made to resume ibrutinib. All cell lines improved. His Hgb and plt have improved significantly since resuming the ibrutinib.\par ROS is negative. Patient is back at work. He got tested for antibodies after getting immunized against COVID and he tested positive. [de-identified] : CLL Markers:\par normal FISH\par CD38+\par unmutated.

## 2021-07-08 NOTE — ASSESSMENT
[FreeTextEntry1] : Mr. Hewitt is a 50 year old male CLL (unmutated IGHV), started ibrutinib June 22, 2020, briefly on hold due to painless hematuria, which was worked-up and has since resolved and not recurred. He states his energy level is great, no new complaints.\par \par Plan:\par 1)CLL: continue ibrutinib.\par 2)Hypogammaglobulinemia: continue home IVIG\par 3): Enlarged prostate, f/u w  as needed\par 4)continue routine health maintenance and age appropriate screening as indicated. Reminded patient to f/u with internist for regular follow-up. Received 2 COVID vaccines. Colonoscopy up to date.\par 5)RTO in 8-12 weeks, telehealth is ok. \par \par

## 2021-07-25 ENCOUNTER — NON-APPOINTMENT (OUTPATIENT)
Age: 51
End: 2021-07-25

## 2021-07-25 ENCOUNTER — APPOINTMENT (OUTPATIENT)
Dept: HEMATOLOGY ONCOLOGY | Facility: CLINIC | Age: 51
End: 2021-07-25
Payer: COMMERCIAL

## 2021-07-25 ENCOUNTER — EMERGENCY (EMERGENCY)
Facility: HOSPITAL | Age: 51
LOS: 1 days | Discharge: HOME | End: 2021-07-25
Attending: STUDENT IN AN ORGANIZED HEALTH CARE EDUCATION/TRAINING PROGRAM | Admitting: EMERGENCY MEDICINE
Payer: COMMERCIAL

## 2021-07-25 VITALS
OXYGEN SATURATION: 98 % | SYSTOLIC BLOOD PRESSURE: 140 MMHG | DIASTOLIC BLOOD PRESSURE: 77 MMHG | TEMPERATURE: 98 F | HEART RATE: 66 BPM | RESPIRATION RATE: 17 BRPM

## 2021-07-25 VITALS
SYSTOLIC BLOOD PRESSURE: 127 MMHG | DIASTOLIC BLOOD PRESSURE: 75 MMHG | RESPIRATION RATE: 18 BRPM | HEIGHT: 72 IN | HEART RATE: 69 BPM | TEMPERATURE: 97 F | OXYGEN SATURATION: 100 %

## 2021-07-25 LAB
ALBUMIN SERPL ELPH-MCNC: 4.2 G/DL — SIGNIFICANT CHANGE UP (ref 3.3–5)
ALP SERPL-CCNC: 61 U/L — SIGNIFICANT CHANGE UP (ref 40–120)
ALT FLD-CCNC: 22 U/L — SIGNIFICANT CHANGE UP (ref 4–41)
ANION GAP SERPL CALC-SCNC: 11 MMOL/L — SIGNIFICANT CHANGE UP (ref 7–14)
APTT BLD: 34.9 SEC — SIGNIFICANT CHANGE UP (ref 27–36.3)
AST SERPL-CCNC: 22 U/L — SIGNIFICANT CHANGE UP (ref 4–40)
BASE EXCESS BLDV CALC-SCNC: 3.5 MMOL/L — HIGH (ref -3–2)
BILIRUB SERPL-MCNC: 0.6 MG/DL — SIGNIFICANT CHANGE UP (ref 0.2–1.2)
BLOOD GAS VENOUS - CREATININE: 1.1 MG/DL — SIGNIFICANT CHANGE UP (ref 0.5–1.3)
BLOOD GAS VENOUS COMPREHENSIVE RESULT: SIGNIFICANT CHANGE UP
BUN SERPL-MCNC: 30 MG/DL — HIGH (ref 7–23)
CALCIUM SERPL-MCNC: 9.2 MG/DL — SIGNIFICANT CHANGE UP (ref 8.4–10.5)
CHLORIDE BLDV-SCNC: 105 MMOL/L — SIGNIFICANT CHANGE UP (ref 96–108)
CHLORIDE SERPL-SCNC: 100 MMOL/L — SIGNIFICANT CHANGE UP (ref 98–107)
CO2 SERPL-SCNC: 25 MMOL/L — SIGNIFICANT CHANGE UP (ref 22–31)
CREAT SERPL-MCNC: 1.09 MG/DL — SIGNIFICANT CHANGE UP (ref 0.5–1.3)
GAS PNL BLDV: 135 MMOL/L — LOW (ref 136–146)
GLUCOSE BLDV-MCNC: 115 MG/DL — HIGH (ref 70–99)
GLUCOSE SERPL-MCNC: 117 MG/DL — HIGH (ref 70–99)
HCO3 BLDV-SCNC: 27 MMOL/L — SIGNIFICANT CHANGE UP (ref 20–27)
HCT VFR BLD CALC: 39.2 % — SIGNIFICANT CHANGE UP (ref 39–50)
HCT VFR BLDA CALC: 41.5 % — SIGNIFICANT CHANGE UP (ref 39–51)
HGB BLD CALC-MCNC: 13.5 G/DL — SIGNIFICANT CHANGE UP (ref 13–17)
HGB BLD-MCNC: 12.6 G/DL — LOW (ref 13–17)
IANC: 5.63 K/UL — SIGNIFICANT CHANGE UP (ref 1.5–8.5)
INR BLD: 1.02 RATIO — SIGNIFICANT CHANGE UP (ref 0.88–1.16)
LACTATE BLDV-MCNC: 1.1 MMOL/L — SIGNIFICANT CHANGE UP (ref 0.5–2)
MCHC RBC-ENTMCNC: 27.2 PG — SIGNIFICANT CHANGE UP (ref 27–34)
MCHC RBC-ENTMCNC: 32.1 GM/DL — SIGNIFICANT CHANGE UP (ref 32–36)
MCV RBC AUTO: 84.5 FL — SIGNIFICANT CHANGE UP (ref 80–100)
PCO2 BLDV: 45 MMHG — SIGNIFICANT CHANGE UP (ref 41–51)
PH BLDV: 7.41 — SIGNIFICANT CHANGE UP (ref 7.32–7.43)
PLATELET # BLD AUTO: 174 K/UL — SIGNIFICANT CHANGE UP (ref 150–400)
PO2 BLDV: 45 MMHG — HIGH (ref 35–40)
POTASSIUM BLDV-SCNC: 3.6 MMOL/L — SIGNIFICANT CHANGE UP (ref 3.4–4.5)
POTASSIUM SERPL-MCNC: 3.8 MMOL/L — SIGNIFICANT CHANGE UP (ref 3.5–5.3)
POTASSIUM SERPL-SCNC: 3.8 MMOL/L — SIGNIFICANT CHANGE UP (ref 3.5–5.3)
PROT SERPL-MCNC: 5.9 G/DL — LOW (ref 6–8.3)
PROTHROM AB SERPL-ACNC: 11.7 SEC — SIGNIFICANT CHANGE UP (ref 10.6–13.6)
RBC # BLD: 4.64 M/UL — SIGNIFICANT CHANGE UP (ref 4.2–5.8)
RBC # FLD: 13.2 % — SIGNIFICANT CHANGE UP (ref 10.3–14.5)
SAO2 % BLDV: 79.4 % — SIGNIFICANT CHANGE UP (ref 60–85)
SODIUM SERPL-SCNC: 136 MMOL/L — SIGNIFICANT CHANGE UP (ref 135–145)
WBC # BLD: 56.86 K/UL — CRITICAL HIGH (ref 3.8–10.5)
WBC # FLD AUTO: 56.86 K/UL — CRITICAL HIGH (ref 3.8–10.5)

## 2021-07-25 PROCEDURE — 73060 X-RAY EXAM OF HUMERUS: CPT | Mod: 26,LT

## 2021-07-25 PROCEDURE — 99285 EMERGENCY DEPT VISIT HI MDM: CPT

## 2021-07-25 PROCEDURE — 99441: CPT

## 2021-07-25 RX ORDER — ACETAMINOPHEN 500 MG
975 TABLET ORAL ONCE
Refills: 0 | Status: COMPLETED | OUTPATIENT
Start: 2021-07-25 | End: 2021-07-25

## 2021-07-25 RX ADMIN — Medication 100 MILLIGRAM(S): at 23:37

## 2021-07-25 NOTE — ED PROVIDER NOTE - PATIENT PORTAL LINK FT
You can access the FollowMyHealth Patient Portal offered by [facility] by registering at the following website: [email or web address]. By joining [facility]’s FollowMyHealth portal, you will also be able to view your health information using other applications (apps) compatible with our system.

## 2021-07-25 NOTE — ED PROVIDER NOTE - PHYSICAL EXAMINATION
Physical Exam:    I have reviewed the triage vital signs.    Gen: NAD, AOx3, non-toxic appearing, able to ambulate without assistance  Head and Neck: NCAT, Neck supple without meningismus   HEENT: EOMI, PEERLA, normal conjunctiva, tongue midline, oral mucosa moist  Lung: CTAB, no respiratory distress, no wheezes/rhonchi/rales B/L, speaking in full sentences  CV: RRR, no murmurs, rubs or gallops  Abd: soft, NT, ND, no guarding, no rigidity, no rebound tenderness, no CVA tenderness, no masses.   MSK: no gross deformities, ROM normal in UE/LE, no back tenderness. Left upper arm has area of warmth skin over bicep.   Neuro: CNs II-XII grossly intact. No focal sensory or motor deficits  Skin: Warm, well perfused, no rash, no leg swelling  Psych: Appropriate mood and affect

## 2021-07-25 NOTE — ED ADULT NURSE NOTE - OBJECTIVE STATEMENT
Patient A&Ox4 ambulatory c/o left arm swelling x4days. Patient states swelling was mild but has been worsening since. Redness and swelling observed to the right bicep area. Warm to touch. Denies trauma. PMH of CLL, baseline WBCs 100. On treatment. Patient denies chest pain, SOB, nausea, vomiting, diarrhea, fever and chills. 20 gauge IV placed to right antecubital, labs sent. Stretcher in lowest position, wheels locked, appropriate side rails in place, call bell in reach.

## 2021-07-25 NOTE — ED ADULT TRIAGE NOTE - CHIEF COMPLAINT QUOTE
swelling to L bicep radiating into L forearm since Thursday, warm/tender to touch, no fevers, + radial pulse/sensation/movement   takes daily imbruvica for CLL

## 2021-07-25 NOTE — ED PROVIDER NOTE - NS ED ROS FT
Gen: No fever, normal appetite  Eyes: No eye irritation or discharge  ENT: No ear pain, congestion, sore throat  Resp: No cough or trouble breathing  Cardiovascular: No chest pain or palpitation  Gastroenteric: No nausea/vomiting, diarrhea, constipation  :  No change in urine output; no dysuria  MS: No joint or muscle pain  Skin: + rashes  Neuro: No headache; no abnormal movements  Remainder negative, except as per the HPI

## 2021-07-25 NOTE — ED ADULT NURSE NOTE - NSIMPLEMENTINTERV_GEN_ALL_ED
Implemented All Universal Safety Interventions:  [place] to call system. Call bell, personal items and telephone within reach. Instruct patient to call for assistance. Room bathroom lighting operational. Non-slip footwear when patient is off stretcher. Physically safe environment: no spills, clutter or unnecessary equipment. Stretcher in lowest position, wheels locked, appropriate side rails in place.

## 2021-07-25 NOTE — ED PROVIDER NOTE - NSFOLLOWUPINSTRUCTIONS_ED_ALL_ED_FT
You were seen for cellulitis.     Seek immediate medical assistance for any new or worsening symptoms.    Please take 600 mg of Ibuprofen (aka Motrin, Advil) and/or 650 mg Acetaminophen (aka Tylenol) every 6 hours, as needed, for mild-moderate pain.      Take Clindamycin 450mg three times a day for 7 days.

## 2021-07-25 NOTE — ED CLERICAL - NS ED CLERK NOTE PRE-ARRIVAL INFORMATION; ADDITIONAL PRE-ARRIVAL INFORMATION
Patient being treated for CLL on Ibrutinib is c/o of left arm pain and swelling. MD asking for ultra sound of the arm and if pt Dx with a hematoma or bleeding please transfuse patient with platelets even if PLT count is normal. Please call MD above if any questions.

## 2021-07-25 NOTE — ED PROVIDER NOTE - OBJECTIVE STATEMENT
50M presents with warmth and redness to left bicep since Thursday, worsening. no fever chills. no malaise. pt feels well. pain to left arm is 4/10. pt denies injury to left upper arm. PMH CLL, baseline WBCs 100. On treatment. He is a [facility] patient. 50M presents with warmth and redness to left bicep since Thursday, worsening. no fever chills. no malaise. pt feels well. pain to left arm is 4/10. pt denies injury to left upper arm. PMH CLL, baseline WBCs 100. On treatment. He is a [facility] patient.

## 2021-07-25 NOTE — ED PROVIDER NOTE - CLINICAL SUMMARY MEDICAL DECISION MAKING FREE TEXT BOX
Sanya - left bicep redness warmth swelling x 4 days. cellulitis versus UE dvt? Patient non toxic. no fever. vss. pe in note above. plan: IV clindamycin cdu for sonogram ro r/o dvt. If sonogram is negative and cellulitis is not progressing, he can be be discharged on clindamycin for cellultis.

## 2021-07-25 NOTE — ED PROVIDER NOTE - ATTENDING CONTRIBUTION TO CARE
I have personally seen and examined this patient.  I have fully participated in the care of this patient. I have reviewed all pertinent clinical information, including history, physical exam, plan and the Resident’s note and agree except as noted. - MD Yosi.    51 yo M, with CLL on chemo, p/w left upper arm swelling, redness    on my exam, warmth and tender to touch but no scratch or skin break  bedside u/s consisten with cobble stone sings, likely cellulitis but dvt cannot be completely excluded  radiologist verified that US is the definitive study and ct is not alternative, will get the va duplex tomorrow in the morning, start IV CLDM, if dvt negative, and improving cellulitis, pt can dischage with po antibx, likely cdu.

## 2021-07-26 LAB
BASOPHILS # BLD AUTO: 0 K/UL — SIGNIFICANT CHANGE UP (ref 0–0.2)
BASOPHILS NFR BLD AUTO: 0 % — SIGNIFICANT CHANGE UP (ref 0–2)
EOSINOPHIL # BLD AUTO: 0 K/UL — SIGNIFICANT CHANGE UP (ref 0–0.5)
EOSINOPHIL NFR BLD AUTO: 0 % — SIGNIFICANT CHANGE UP (ref 0–6)
LYMPHOCYTES # BLD AUTO: 50.04 K/UL — HIGH (ref 1–3.3)
LYMPHOCYTES # BLD AUTO: 88 % — HIGH (ref 13–44)
MANUAL SMEAR VERIFICATION: SIGNIFICANT CHANGE UP
MONOCYTES # BLD AUTO: 0.57 K/UL — SIGNIFICANT CHANGE UP (ref 0–0.9)
MONOCYTES NFR BLD AUTO: 1 % — LOW (ref 2–14)
NEUTROPHILS # BLD AUTO: 2.27 K/UL — SIGNIFICANT CHANGE UP (ref 1.8–7.4)
NEUTROPHILS NFR BLD AUTO: 4 % — LOW (ref 43–77)
NRBC # BLD: 0 /100 — SIGNIFICANT CHANGE UP (ref 0–0)
PLAT MORPH BLD: NORMAL — SIGNIFICANT CHANGE UP
PLATELET COUNT - ESTIMATE: NORMAL — SIGNIFICANT CHANGE UP
RBC BLD AUTO: SIGNIFICANT CHANGE UP
SARS-COV-2 RNA SPEC QL NAA+PROBE: SIGNIFICANT CHANGE UP
VARIANT LYMPHS # BLD: 7 % — HIGH (ref 0–6)

## 2021-07-26 PROCEDURE — 93971 EXTREMITY STUDY: CPT | Mod: 26,LT

## 2021-07-31 LAB
CULTURE RESULTS: SIGNIFICANT CHANGE UP
CULTURE RESULTS: SIGNIFICANT CHANGE UP
SPECIMEN SOURCE: SIGNIFICANT CHANGE UP
SPECIMEN SOURCE: SIGNIFICANT CHANGE UP

## 2021-09-01 ENCOUNTER — APPOINTMENT (OUTPATIENT)
Dept: HEMATOLOGY ONCOLOGY | Facility: CLINIC | Age: 51
End: 2021-09-01
Payer: COMMERCIAL

## 2021-09-01 ENCOUNTER — NON-APPOINTMENT (OUTPATIENT)
Age: 51
End: 2021-09-01

## 2021-09-01 PROCEDURE — 99421 OL DIG E/M SVC 5-10 MIN: CPT

## 2021-10-14 ENCOUNTER — RX RENEWAL (OUTPATIENT)
Age: 51
End: 2021-10-14

## 2021-10-21 ENCOUNTER — APPOINTMENT (OUTPATIENT)
Dept: HEMATOLOGY ONCOLOGY | Facility: CLINIC | Age: 51
End: 2021-10-21
Payer: COMMERCIAL

## 2021-10-21 PROCEDURE — 99421 OL DIG E/M SVC 5-10 MIN: CPT

## 2021-10-22 ENCOUNTER — APPOINTMENT (OUTPATIENT)
Dept: PULMONOLOGY | Facility: CLINIC | Age: 51
End: 2021-10-22
Payer: COMMERCIAL

## 2021-10-22 ENCOUNTER — TRANSCRIPTION ENCOUNTER (OUTPATIENT)
Age: 51
End: 2021-10-22

## 2021-10-22 ENCOUNTER — OUTPATIENT (OUTPATIENT)
Dept: OUTPATIENT SERVICES | Facility: HOSPITAL | Age: 51
LOS: 1 days | End: 2021-10-22

## 2021-10-22 ENCOUNTER — APPOINTMENT (OUTPATIENT)
Dept: DISASTER EMERGENCY | Facility: HOSPITAL | Age: 51
End: 2021-10-22

## 2021-10-22 ENCOUNTER — APPOINTMENT (OUTPATIENT)
Dept: PULMONOLOGY | Facility: CLINIC | Age: 51
End: 2021-10-22

## 2021-10-22 VITALS
SYSTOLIC BLOOD PRESSURE: 128 MMHG | OXYGEN SATURATION: 97 % | HEIGHT: 72 IN | DIASTOLIC BLOOD PRESSURE: 84 MMHG | TEMPERATURE: 98 F | RESPIRATION RATE: 20 BRPM | HEART RATE: 70 BPM | WEIGHT: 175.05 LBS

## 2021-10-22 VITALS — TEMPERATURE: 98 F | RESPIRATION RATE: 18 BRPM | HEART RATE: 65 BPM | OXYGEN SATURATION: 97 %

## 2021-10-22 DIAGNOSIS — U07.1 COVID-19: ICD-10-CM

## 2021-10-22 PROCEDURE — 99203 OFFICE O/P NEW LOW 30 MIN: CPT | Mod: 95

## 2021-10-22 RX ORDER — SODIUM CHLORIDE 9 MG/ML
250 INJECTION INTRAMUSCULAR; INTRAVENOUS; SUBCUTANEOUS
Refills: 0 | Status: DISCONTINUED | OUTPATIENT
Start: 2021-10-22 | End: 2021-11-06

## 2021-10-22 NOTE — HISTORY OF PRESENT ILLNESS
[Home] : at home, [unfilled] , at the time of the visit. [Medical Office: ([facility])___] : at the medical office located in  [Verbal consent obtained from patient] : the patient, [unfilled] [FreeTextEntry1] : Telehealth visit, [facility] consultation. COVID\par \par 50 y/o man, h/o CLL\par Vaccinated with Pfizer in March/April, and received a 3rd dose on 9/15\par \par Son had a runny nose, tested positive earlier this week. His wife and daughter positive as well.\par He has had mild sx since Wednesday, temps 99.8, a little fatigue. Nothing else. No resp sx at all. Has a pulse ox, has been normal\par Tested positive at [facility]\par \par On video, looks well, not ill appearing. speakng clearly , no distress\par \par Breathrough COVID infection in immunocompromised pt despite 3 doses Pfizer\par Sx are very mild.\par I have referred for MAB.\par Continue good fluid intake, monitor for resp sx and pulse ox -- let us know asap if <94

## 2021-10-22 NOTE — CHART NOTE - NSCHARTNOTEFT_GEN_A_CORE
CC: Monoclonal Antibody Infusion/COVID 19 Positive      History: Patient presents for infusion of monoclonal antibody infusion. Patient has been screened and was deemed to be a candidate.    Symptoms/ Criteria: Malaise, HA, fever    Inclusion Criteria: CLL, HTN  Positive Test Date: 10/21  Date of Symptom Onset: 10/21    casirivimab/imdevimab in sodium chloride 0.9% (EUA) IVPB 100 milliLiter(s) IV Intermittent once  sodium chloride 0.9%. 250 milliLiter(s) IV Continuous <Continuous>      PMHx:  Infection due to severe acute respiratory syndrome coronavirus 2 (SARS-CoV-2)    CLL (chronic lymphocytic leukemia)    Acute depression    Hypertension    Hyperlipidemia    S/P hernia surgery    SysAdmin_VisitLink          T(C): 36.7 (10-22-21 @ 16:31), Max: 36.7 (10-22-21 @ 15:55)  HR: 66 (10-22-21 @ 16:31) (66 - 70)  BP: 136/80 (10-22-21 @ 16:31) (123/87 - 136/80)  RR: 18 (10-22-21 @ 16:31) (18 - 20)  SpO2: 96% (10-22-21 @ 16:31) (96% - 97%)      PE:   Appearance: NAD	  HEENT:   Normal oral mucosa.   Lymphatic: No lymphadenopathy  Cardiovascular: Normal S1 S2, No JVD, No murmurs, No edema  Respiratory: Lungs clear to auscultation	  Gastrointestinal:  Soft, Non-tender. No guarding   Skin: warm and dry  Neurologic: Non-focal  Extremities: Normal range of motion.     ASSESSMENT:  Pt is a 51y year old Male with PMH Covid +  referred by Saima Soares to the infusion center for Monoclonal antibody infusion ([facility]ener[facility]).        PLAN:  - infusion procedure explained to patient   - Consent for monoclonal antibody infusion obtained   - Risk & benifits discussed/all questions answered  - infuse Regeneron over one hour   - will observe patient for one hour post infusion  and then if stable discharge home with oupt follow up as planned by PMD.    POST INFUSION ASSESSMENT:   DISCHARGE at approximately 17:30 hours    - Patient tolerated infusion well denies complaints of chest pain/SOB/dizziness/ palps  - VSS for discharge home  - D/C instructions given/ fact sheet included.  - Patient to follow-up with PCP as needed.

## 2021-10-23 ENCOUNTER — TRANSCRIPTION ENCOUNTER (OUTPATIENT)
Age: 51
End: 2021-10-23

## 2021-11-16 ENCOUNTER — LABORATORY RESULT (OUTPATIENT)
Age: 51
End: 2021-11-16

## 2021-11-17 ENCOUNTER — APPOINTMENT (OUTPATIENT)
Dept: HEMATOLOGY ONCOLOGY | Facility: CLINIC | Age: 51
End: 2021-11-17
Payer: COMMERCIAL

## 2021-11-17 LAB
ALBUMIN SERPL ELPH-MCNC: 4.9 G/DL
ALP BLD-CCNC: 61 U/L
ALT SERPL-CCNC: 22 U/L
ANION GAP SERPL CALC-SCNC: 12 MMOL/L
AST SERPL-CCNC: 22 U/L
BASOPHILS # BLD AUTO: 0.1 K/UL
BASOPHILS NFR BLD AUTO: 0.4 %
BILIRUB SERPL-MCNC: 1.1 MG/DL
BUN SERPL-MCNC: 17 MG/DL
CALCIUM SERPL-MCNC: 9.6 MG/DL
CHLORIDE SERPL-SCNC: 101 MMOL/L
CO2 SERPL-SCNC: 26 MMOL/L
CREAT SERPL-MCNC: 1.22 MG/DL
EOSINOPHIL # BLD AUTO: 0.04 K/UL
EOSINOPHIL NFR BLD AUTO: 0.2 %
GLUCOSE SERPL-MCNC: 87 MG/DL
HCT VFR BLD CALC: 47.3 %
HGB BLD-MCNC: 14.6 G/DL
IMM GRANULOCYTES NFR BLD AUTO: 0.2 %
LDH SERPL-CCNC: 140 U/L
LYMPHOCYTES # BLD AUTO: 17.53 K/UL
LYMPHOCYTES NFR BLD AUTO: 74.4 %
MAGNESIUM SERPL-MCNC: 1.9 MG/DL
MAN DIFF?: NORMAL
MCHC RBC-ENTMCNC: 27.6 PG
MCHC RBC-ENTMCNC: 30.9 GM/DL
MCV RBC AUTO: 89.4 FL
MONOCYTES # BLD AUTO: 0.7 K/UL
MONOCYTES NFR BLD AUTO: 3 %
NEUTROPHILS # BLD AUTO: 5.14 K/UL
NEUTROPHILS NFR BLD AUTO: 21.8 %
PHOSPHATE SERPL-MCNC: 4.1 MG/DL
PLATELET # BLD AUTO: 214 K/UL
POTASSIUM SERPL-SCNC: 4.7 MMOL/L
PROT SERPL-MCNC: 6.6 G/DL
RBC # BLD: 5.29 M/UL
RBC # FLD: 13.2 %
SODIUM SERPL-SCNC: 139 MMOL/L
URATE SERPL-MCNC: 4.9 MG/DL
WBC # FLD AUTO: 23.56 K/UL

## 2021-11-17 PROCEDURE — 99215 OFFICE O/P EST HI 40 MIN: CPT | Mod: 95

## 2021-11-21 NOTE — HISTORY OF PRESENT ILLNESS
[Disease:__________________________] : Disease: [unfilled] [Medical Office: ([facility])___] : at the medical office located in  [Verbal consent obtained from patient] : the patient, [unfilled] [de-identified] : Mr. Hewitt is a a 51 year old male with CLL (dx 2006), who presents for follow-up shortly after dealing with COVID infection despite being fully vaccinated and with a booster.\par He responded well to ibrutinib (started on Monday June 22, 2020) but this was shortly on hold due to painless gross hematuria, likely related to ibrutinib use as all work-up was negative. While off therapy, he became anemic and thrombocytopenic, so we resumed ibrutinib shortly after.\par He has not had any more episodes where he notes blood in the urine. He denies any new dysuria, hematuria, frequency, or  burning when he urinates.\par No recurrence of hematuria, but he has noted new mild arthralgias that are not currently affecting QOL.\par \par He is back at work after receiving the monoclonal antibodies. He also reports that one night he felt very febrile and unable to move, but by the morning all the symptoms subsided and he had no more issues. [de-identified] : CLL Markers:\par normal FISH\par CD38+\par unmutated.  [Other Location: e.g. School (Enter Location, City,State)___] : at [unfilled], at the time of the visit.

## 2021-11-21 NOTE — REVIEW OF SYSTEMS
[Negative] : Allergic/Immunologic [Chills] : chills [FreeTextEntry2] : received monoclonal antibodies

## 2021-11-21 NOTE — PHYSICAL EXAM
[Fully active, able to carry on all pre-disease performance without restriction] : Status 0 - Fully active, able to carry on all pre-disease performance without restriction [Normal] : no peripheral adenopathy appreciated [de-identified] : no distress, speaking in full sentences [de-identified] : aox3

## 2021-11-21 NOTE — ASSESSMENT
[FreeTextEntry1] : Mr. Hewitt is a 51 year old male CLL (unmutated IGHV), started ibrutinib June 22, 2020, briefly on hold due to  painless hematuria, which was worked-up and has since resolved. He recently recovered from COVID infection, s/p monoclonal antibody infusion. He has noted arthralgias intermittently, we discussed this at length.\par \par Plan:\par 1)CLL: continue ibrutinib. We discussed pros/cons of considering change to acalabrutinib vs reducing the dosage vs holding off drug to see if arthralgias improve. Based on his response to change to the ibrutinib dosage, we will decide whether to make it permanent.\par Reminded him that although his PLT count is normal, they are dysfunctional due to ibrutinib, so he needs to take precautions before any surgical interventions.\par 2)Hypogammaglobulinemia: continue home IVIG\par 3): Enlarged prostate, f/u w  as needed\par 4)continue routine health maintenance and age appropriate screening as indicated. Reminded patient to f/u with internist for regular follow-up.\par 5) Patient was made aware that I will be leaving the practice and I will be relocating to [place]. Dr Meier/[place] will cover my patients.  RTO in 6-8 weeks, telehealth is ok.

## 2022-01-28 ENCOUNTER — LABORATORY RESULT (OUTPATIENT)
Age: 52
End: 2022-01-28

## 2022-01-31 LAB
ALBUMIN SERPL ELPH-MCNC: 4.9 G/DL
ALP BLD-CCNC: 84 U/L
ALT SERPL-CCNC: 62 U/L
ANION GAP SERPL CALC-SCNC: 13 MMOL/L
AST SERPL-CCNC: 38 U/L
BASOPHILS # BLD AUTO: 0.04 K/UL
BASOPHILS NFR BLD AUTO: 0.3 %
BILIRUB SERPL-MCNC: 0.4 MG/DL
BUN SERPL-MCNC: 21 MG/DL
CALCIUM SERPL-MCNC: 9.2 MG/DL
CHLORIDE SERPL-SCNC: 100 MMOL/L
CO2 SERPL-SCNC: 27 MMOL/L
CREAT SERPL-MCNC: 1.05 MG/DL
EOSINOPHIL # BLD AUTO: 0.03 K/UL
EOSINOPHIL NFR BLD AUTO: 0.2 %
GLUCOSE SERPL-MCNC: 82 MG/DL
HCT VFR BLD CALC: 42.7 %
HGB BLD-MCNC: 13.8 G/DL
IMM GRANULOCYTES NFR BLD AUTO: 0.2 %
LDH SERPL-CCNC: 134 U/L
LYMPHOCYTES # BLD AUTO: 9.02 K/UL
LYMPHOCYTES NFR BLD AUTO: 69.2 %
MAGNESIUM SERPL-MCNC: 1.8 MG/DL
MAN DIFF?: NORMAL
MCHC RBC-ENTMCNC: 27.6 PG
MCHC RBC-ENTMCNC: 32.3 GM/DL
MCV RBC AUTO: 85.4 FL
MONOCYTES # BLD AUTO: 0.85 K/UL
MONOCYTES NFR BLD AUTO: 6.5 %
NEUTROPHILS # BLD AUTO: 3.07 K/UL
NEUTROPHILS NFR BLD AUTO: 23.6 %
PHOSPHATE SERPL-MCNC: 4.5 MG/DL
PLATELET # BLD AUTO: 130 K/UL
POTASSIUM SERPL-SCNC: 4.8 MMOL/L
PROT SERPL-MCNC: 6.8 G/DL
RBC # BLD: 5 M/UL
RBC # FLD: 13.2 %
SODIUM SERPL-SCNC: 139 MMOL/L
URATE SERPL-MCNC: 5 MG/DL
WBC # FLD AUTO: 13.04 K/UL

## 2022-02-08 ENCOUNTER — APPOINTMENT (OUTPATIENT)
Dept: HEMATOLOGY ONCOLOGY | Facility: CLINIC | Age: 52
End: 2022-02-08
Payer: COMMERCIAL

## 2022-02-08 PROCEDURE — 99215 OFFICE O/P EST HI 40 MIN: CPT | Mod: GC,95

## 2022-02-08 NOTE — REASON FOR VISIT
[Other Location: e.g. School (Enter Location, City,State)___] : at [unfilled], at the time of the visit. [Medical Office: ([facility])___] : at the medical office located in  [Verbal consent obtained from patient] : the patient, [unfilled] [Follow-Up Visit] : a follow-up visit for [CLL] : chronic lymphocytic leukemia

## 2022-02-10 NOTE — REVIEW OF SYSTEMS
[Fatigue] : fatigue [Negative] : Allergic/Immunologic [Fever] : no fever [Chills] : no chills [Night Sweats] : no night sweats

## 2022-02-10 NOTE — ASSESSMENT
[FreeTextEntry1] : Mr. Hewitt is a 51 year old male CLL (unmutated IGHV), started ibrutinib June 22, 2020, briefly on hold due to  painless hematuria, which was worked-up and has since resolved. He recently recovered from COVID infection, s/p monoclonal antibody infusion. He dose reduced ibruitnib to 280 mg daily due to arthralgias\par \par Plan:\par 1)CLL\par - continue ibrutinib (280mg daily dose reduced 2/2 arthalgias which is now improved)\par - recent labs 01/28/22 WBC 13, Hb 13.8, Plt 130\par - unclear if recent fatigue related to reduced dose of ibrutinib given slight drop in Plt. Will repeat labs in 2wks including CBC and thyroid function tests and monitor fatigue. If continued fatigue/worsening counts will consider increase ibrutinib back to 420mg daily vs switching to acalabrutinib which may have better side effect profile (fewer arthralgias)\par - we also discussed elana, information packet emailed to patient for him to review\par \par 2)Hypogammaglobulinemia: continue home IVIG\par \par 3): Enlarged prostate, f/u w  as needed\par \par 4)continue routine health maintenance and age appropriate screening as indicated. Reminded patient to f/u with internist for regular follow-up.\par \par

## 2022-02-10 NOTE — PHYSICAL EXAM
[Fully active, able to carry on all pre-disease performance without restriction] : Status 0 - Fully active, able to carry on all pre-disease performance without restriction [Normal] : affect appropriate [de-identified] : aox3

## 2022-02-10 NOTE — HISTORY OF PRESENT ILLNESS
[Disease:__________________________] : Disease: [unfilled] [de-identified] : Mr. Hewitt is a a 51 year old male with CLL (dx 2006), who presents for follow-up shortly after dealing with COVID infection despite being fully vaccinated and with a booster.\par He responded well to ibrutinib (started on Monday June 22, 2020) but this was shortly on hold due to painless gross hematuria, likely related to ibrutinib use as all work-up was negative. While off therapy, he became anemic and thrombocytopenic, so we resumed ibrutinib shortly after.\par He has not had any more episodes where he notes blood in the urine. He denies any new dysuria, hematuria, frequency, or  burning when he urinates.\par No recurrence of hematuria, but he has noted new mild arthralgias that are not currently affecting QOL.\par \par He is back at work after receiving the monoclonal antibodies. He also reports that one night he felt very febrile and unable to move, but by the morning all the symptoms subsided and he had no more issues. [de-identified] : CLL Markers:\par normal FISH\par CD38+\par unmutated.  [de-identified] : 2/8/22: Since his last visit, he has been feeling more fatigue for the last month. He is still able to complete his tasks at work but feels more tired by the end of the day. He is on reduced dose of ibrutinib (280mg daily) due to arthralgias; the arthralgias have improved. Denies fevers/chills, night sweats, weight loss. Also denies chest pain, SOB, diarrhea, rash, easy bruising/bleeding. he does nto think the change in fatigue coincided with when he changed his ibrutinib dose. \par

## 2022-02-10 NOTE — END OF VISIT
[FreeTextEntry3] : Patient seen and case discussed with Dr. Lino- 52 yo male with history of CLL on ibrutinib (dose reduced due to arthralgias), recent COVID19 infection. He has had some increasing fatigue, mild thrombocytopenia on labs. Unclear if related to dose reduction in ibrutinib. Will repeat in 2 weeks and discuss re-escalating to 420 mg daily or switching to acalabrutinib.  [Time Spent: ___ minutes] : I have spent [unfilled] minutes of time on the encounter.

## 2022-03-09 ENCOUNTER — RX RENEWAL (OUTPATIENT)
Age: 52
End: 2022-03-09

## 2022-05-17 ENCOUNTER — LABORATORY RESULT (OUTPATIENT)
Age: 52
End: 2022-05-17

## 2022-05-17 LAB
ALBUMIN SERPL ELPH-MCNC: 4.7 G/DL
ALP BLD-CCNC: 60 U/L
ALT SERPL-CCNC: 31 U/L
ANION GAP SERPL CALC-SCNC: 10 MMOL/L
AST SERPL-CCNC: 26 U/L
BILIRUB SERPL-MCNC: 0.5 MG/DL
BUN SERPL-MCNC: 20 MG/DL
CALCIUM SERPL-MCNC: 9.3 MG/DL
CHLORIDE SERPL-SCNC: 103 MMOL/L
CO2 SERPL-SCNC: 27 MMOL/L
CREAT SERPL-MCNC: 1.11 MG/DL
EGFR: 81 ML/MIN/1.73M2
GLUCOSE SERPL-MCNC: 86 MG/DL
LDH SERPL-CCNC: 138 U/L
MAGNESIUM SERPL-MCNC: 1.9 MG/DL
PHOSPHATE SERPL-MCNC: 3.9 MG/DL
POTASSIUM SERPL-SCNC: 4.5 MMOL/L
PROT SERPL-MCNC: 6.4 G/DL
SODIUM SERPL-SCNC: 141 MMOL/L
URATE SERPL-MCNC: 5.3 MG/DL

## 2022-05-18 LAB
BASOPHILS # BLD AUTO: 0.09 K/UL
BASOPHILS NFR BLD AUTO: 0.7 %
EOSINOPHIL # BLD AUTO: 0.1 K/UL
EOSINOPHIL NFR BLD AUTO: 0.7 %
HCT VFR BLD CALC: 43.5 %
HGB BLD-MCNC: 14 G/DL
IMM GRANULOCYTES NFR BLD AUTO: 0.3 %
LYMPHOCYTES # BLD AUTO: 8.96 K/UL
LYMPHOCYTES NFR BLD AUTO: 65.1 %
MAN DIFF?: NORMAL
MCHC RBC-ENTMCNC: 27.5 PG
MCHC RBC-ENTMCNC: 32.2 GM/DL
MCV RBC AUTO: 85.3 FL
MONOCYTES # BLD AUTO: 0.76 K/UL
MONOCYTES NFR BLD AUTO: 5.5 %
NEUTROPHILS # BLD AUTO: 3.82 K/UL
NEUTROPHILS NFR BLD AUTO: 27.7 %
PLATELET # BLD AUTO: 220 K/UL
RBC # BLD: 5.1 M/UL
RBC # FLD: 12.5 %
WBC # FLD AUTO: 13.77 K/UL

## 2022-05-20 ENCOUNTER — APPOINTMENT (OUTPATIENT)
Dept: HEMATOLOGY ONCOLOGY | Facility: CLINIC | Age: 52
End: 2022-05-20
Payer: COMMERCIAL

## 2022-05-20 PROCEDURE — 99214 OFFICE O/P EST MOD 30 MIN: CPT | Mod: GC,95

## 2022-05-20 NOTE — PHYSICAL EXAM
[Fully active, able to carry on all pre-disease performance without restriction] : Status 0 - Fully active, able to carry on all pre-disease performance without restriction [Normal] : affect appropriate [de-identified] : aox3

## 2022-05-20 NOTE — ASSESSMENT
[FreeTextEntry1] : Mr. Hewitt is a 51 year old male CLL (unmutated IGHV), started ibrutinib June 22, 2020, briefly on hold due to  painless hematuria, which was worked-up and has since resolved. He recently recovered from COVID infection, s/p monoclonal antibody infusion. He dose reduced ibruitnib to 280 mg daily due to arthralgias\par \par Plan:\par 1)CLL\par - continue ibrutinib (280mg daily dose reduced 2/2 arthalgias which is now improved)\par - recent labs from 5/17/22 reviewed plt are now normal\par - If continued fatigue/worsening counts will consider increase ibrutinib back to 420mg daily vs switching to acalabrutinib which may have better side effect profile (fewer arthralgias)\par - we also discussed elana, information packet emailed to patient for him to review\par \par 2)Hypogammaglobulinemia: continue home IVIG\par \par 3): Enlarged prostate, f/u w  as needed\par \par 4)continue routine health maintenance and age appropriate screening as indicated. Reminded patient to f/u with internist for regular follow-up.\par \par RTO in 3 months\par

## 2022-05-20 NOTE — HISTORY OF PRESENT ILLNESS
[Disease:__________________________] : Disease: [unfilled] [de-identified] : Mr. Hewitt is a a 51 year old male with CLL (dx 2006), who presents for follow-up shortly after dealing with COVID infection despite being fully vaccinated and with a booster.\par He responded well to ibrutinib (started on Monday June 22, 2020) but this was shortly on hold due to painless gross hematuria, likely related to ibrutinib use as all work-up was negative. While off therapy, he became anemic and thrombocytopenic, so we resumed ibrutinib shortly after.\par He has not had any more episodes where he notes blood in the urine. He denies any new dysuria, hematuria, frequency, or  burning when he urinates.\par No recurrence of hematuria, but he has noted new mild arthralgias that are not currently affecting QOL.\par \par He is back at work after receiving the monoclonal antibodies. He also reports that one night he felt very febrile and unable to move, but by the morning all the symptoms subsided and he had no more issues. [de-identified] : CLL Markers:\par normal FISH\par CD38+\par unmutated.  [de-identified] : 5/20/22: Fatigue is stable. He is still able to complete his tasks at work but feels more tired by the end of the day. He is on reduced dose of ibrutinib (280mg daily) due to arthralgias; the arthralgias have improved. Denies fevers/chills, night sweats, weight loss. Also denies chest pain, SOB, diarrhea, rash, easy bruising/bleeding.

## 2022-05-20 NOTE — END OF VISIT
[Time Spent: ___ minutes] : I have spent [unfilled] minutes of time on the encounter. [FreeTextEntry3] : Patient seen and case discussed with SYMONE Hogan. I agree with above and have edited the note where needed.\par

## 2022-05-20 NOTE — REASON FOR VISIT
[Follow-Up Visit] : a follow-up visit for [CLL] : chronic lymphocytic leukemia [Other Location: e.g. School (Enter Location, City,State)___] : at [unfilled], at the time of the visit. [Medical Office: ([facility])___] : at the medical office located in  [Verbal consent obtained from patient] : the patient, [unfilled]

## 2022-06-07 ENCOUNTER — EMERGENCY (EMERGENCY)
Facility: HOSPITAL | Age: 52
LOS: 1 days | Discharge: HOME | End: 2022-06-07
Attending: EMERGENCY MEDICINE | Admitting: EMERGENCY MEDICINE
Payer: COMMERCIAL

## 2022-06-07 VITALS
RESPIRATION RATE: 15 BRPM | HEIGHT: 72 IN | HEART RATE: 63 BPM | OXYGEN SATURATION: 99 % | SYSTOLIC BLOOD PRESSURE: 130 MMHG | DIASTOLIC BLOOD PRESSURE: 75 MMHG | TEMPERATURE: 98 F | WEIGHT: 182.1 LBS

## 2022-06-07 VITALS
SYSTOLIC BLOOD PRESSURE: 121 MMHG | HEART RATE: 57 BPM | TEMPERATURE: 98 F | RESPIRATION RATE: 16 BRPM | OXYGEN SATURATION: 97 % | DIASTOLIC BLOOD PRESSURE: 75 MMHG

## 2022-06-07 PROCEDURE — 96376 TX/PRO/DX INJ SAME DRUG ADON: CPT

## 2022-06-07 PROCEDURE — 96374 THER/PROPH/DIAG INJ IV PUSH: CPT

## 2022-06-07 PROCEDURE — 99284 EMERGENCY DEPT VISIT MOD MDM: CPT | Mod: 25

## 2022-06-07 PROCEDURE — 96375 TX/PRO/DX INJ NEW DRUG ADDON: CPT

## 2022-06-07 PROCEDURE — 99284 EMERGENCY DEPT VISIT MOD MDM: CPT

## 2022-06-07 RX ORDER — HYDROMORPHONE HYDROCHLORIDE 2 MG/ML
0.5 INJECTION INTRAMUSCULAR; INTRAVENOUS; SUBCUTANEOUS ONCE
Refills: 0 | Status: DISCONTINUED | OUTPATIENT
Start: 2022-06-07 | End: 2022-06-07

## 2022-06-07 RX ORDER — ONDANSETRON 8 MG/1
4 TABLET, FILM COATED ORAL ONCE
Refills: 0 | Status: COMPLETED | OUTPATIENT
Start: 2022-06-07 | End: 2022-06-07

## 2022-06-07 RX ORDER — OXYCODONE AND ACETAMINOPHEN 5; 325 MG/1; MG/1
1 TABLET ORAL ONCE
Refills: 0 | Status: DISCONTINUED | OUTPATIENT
Start: 2022-06-07 | End: 2022-06-07

## 2022-06-07 RX ORDER — SODIUM CHLORIDE 9 MG/ML
500 INJECTION INTRAMUSCULAR; INTRAVENOUS; SUBCUTANEOUS ONCE
Refills: 0 | Status: COMPLETED | OUTPATIENT
Start: 2022-06-07 | End: 2022-06-07

## 2022-06-07 RX ADMIN — HYDROMORPHONE HYDROCHLORIDE 0.5 MILLIGRAM(S): 2 INJECTION INTRAMUSCULAR; INTRAVENOUS; SUBCUTANEOUS at 03:59

## 2022-06-07 RX ADMIN — SODIUM CHLORIDE 500 MILLILITER(S): 9 INJECTION INTRAMUSCULAR; INTRAVENOUS; SUBCUTANEOUS at 05:00

## 2022-06-07 RX ADMIN — OXYCODONE AND ACETAMINOPHEN 1 TABLET(S): 5; 325 TABLET ORAL at 06:21

## 2022-06-07 RX ADMIN — SODIUM CHLORIDE 250 MILLILITER(S): 9 INJECTION INTRAMUSCULAR; INTRAVENOUS; SUBCUTANEOUS at 04:00

## 2022-06-07 RX ADMIN — HYDROMORPHONE HYDROCHLORIDE 0.5 MILLIGRAM(S): 2 INJECTION INTRAMUSCULAR; INTRAVENOUS; SUBCUTANEOUS at 05:08

## 2022-06-07 RX ADMIN — HYDROMORPHONE HYDROCHLORIDE 0.5 MILLIGRAM(S): 2 INJECTION INTRAMUSCULAR; INTRAVENOUS; SUBCUTANEOUS at 05:38

## 2022-06-07 RX ADMIN — HYDROMORPHONE HYDROCHLORIDE 0.5 MILLIGRAM(S): 2 INJECTION INTRAMUSCULAR; INTRAVENOUS; SUBCUTANEOUS at 05:00

## 2022-06-07 RX ADMIN — OXYCODONE AND ACETAMINOPHEN 1 TABLET(S): 5; 325 TABLET ORAL at 06:38

## 2022-06-07 RX ADMIN — ONDANSETRON 4 MILLIGRAM(S): 8 TABLET, FILM COATED ORAL at 03:59

## 2022-06-07 NOTE — ED PROVIDER NOTE - CARE PROVIDER_API CALL
Shahbaz Fagan  ORTHOPAEDIC SURGERY  [address] [place] [address]  Phone: [phone]  Fax: [phone]  Follow Up Time: 1-3 Days

## 2022-06-07 NOTE — ED PROVIDER NOTE - OBJECTIVE STATEMENT
51 y.o. M had right medial meniscal surgery yesterday 6/6 by Dr. Shahbaz Fagan at [facility] surgical [place], BIBEMS now for right knee pain - took tylenol 1g and tramadol earlier without relief (per EMS, tramadol was not his rx) 51 y.o. M had right medial meniscal surgery yesterday 6/6 by Dr. Shahbaz Fagan at [facility] surgical center, BIBEMS now for right knee pain - took tylenol 1g and tramadol earlier without relief (per EMS, tramadol was not his rx) - pt states he initially injured the knee a few weeks ago, MRI showed medial meniscal injury, states after consultation with Dr. Fagan, was expecting to have minimal pain and recovery time after the surgery, states when he woke from the surgery, which was started around 1530, pt was in pain, thought it was normal, but over the evening it progressed, states he spoke to the surgeon via phone on his way home from the surgicenter (was not available in person) and was advised the tear was worse than expected and could not be repaired, pt has f/u in a week and believes they will discuss next step at that time, pt states over the evening/night, as the pain worsened, he tried to contact the surgeon but hasn't received a call back. pt is in a knee immobilizer, states he was advised to keep leg in immobilizer with knee straight, that he could change the bandage and shower wednesday (6/8), and if he is sitting on a couch (unclear if this is wednesday and after) can have the knee at a 90degree angle, but otherwise is supposed to keep knee straight. at home has been so uncomfortable that he loosened the knee immobilizer, knee is flexed about 30degrees, which provides mild relief, but states he is unable to straighten it at this time. no paresthesias, no fever

## 2022-06-07 NOTE — ED ADULT NURSE NOTE - CHIEF COMPLAINT QUOTE
s/p right knee surgery 06/06/22 for torn meniscus; now c/o increased right knee pain; took Tramadol @ 2330; took Tylenol @ 2230

## 2022-06-07 NOTE — ED ADULT NURSE NOTE - OBJECTIVE STATEMENT
Pt had rt knee meniscal repair sx yesterday, and is now for rt knee pain, took tylenol at home and tramadol without relief

## 2022-06-07 NOTE — ED PROVIDER NOTE - NSFOLLOWUPINSTRUCTIONS_ED_ALL_ED_FT
Pain Relief Before and After Surgery    A doctor talking with a person while showing the person information on a computer tablet.   Pain relief is an important part of your overall care before, during, and after surgery. You and your health care provider will work together to make a plan to manage any pain that you have before surgery (preoperative) and after surgery (postoperative). Addressing pain before surgery lessens the pain that you will have after surgery.    Make sure that you fully understand and agree with your pain relief plan. If you have questions or concerns, it is important to discuss them with your health care provider.    If you have pain that is not controlled by medicine, tell your health care provider. Severe pain after surgery may:  •Prevent sleep.      •Decrease your ability to breathe deeply and to cough. This can result in pneumonia or upper airway infections.      •Cause your heart to beat more quickly.      •Cause your blood pressure to be higher.      •Increase your risk for stomach and digestive problems.      •Slow down wound healing.      •Lead to depression, anxiety, and feelings of helplessness.      Your health care provider may use more than one method at a time to help relieve your pain. Using this approach may allow you to eat, move around, and possibly leave the hospital sooner.      What are options for managing pain before and after surgery?    Oral pain medicines     Pain medicines taken by mouth (orally) include:•Non-narcotic medicines:  •Acetaminophen.      •NSAIDs, such as ibuprofen and naproxen.        •Muscle relaxants. These may relieve pain caused by muscle spasms.      •Anticonvulsants. These medicines are usually used to treat seizures. They may help to lessen nerve pain.    •Opioids. These medicines relieve pain by binding to pain receptors in the brain and spinal cord (narcotic pain medicines). Opioids may help relieve short-term (acute) postoperative pain that is moderate to moderately severe.  •Opioids are often combined with non-narcotic medicines to improve pain relief, lower the risk of side effects, and lower the chance of addiction.      •Some common side effects of opioids include constipation, nausea, and excessive sleepiness.      •To help prevent addiction, opioids are given for short periods of time in careful doses. If you follow instructions from your health care provider and you do not have a history of substance abuse, your risk of becoming addicted to opioids is low.        Some of these medicines may be available in injectable form. They may be given through an IV if you are unable to eat or drink.    As-needed pain control    You can receive pain medicine when you need it, through an IV or as a pill or liquid. When you tell your health care provider that you are having pain, he or she will give you the proper pain medicine.    Medicine that numbs an area    You may be given pain medicine that numbs an area. This is called a local anesthetic. It may be given:  •As an injection near your painful area (local infiltration).      •As an injection near the nerve that provides feeling to a specific part of your body (peripheral nerve block).      •As an injection in your spine (spinal block).      •Through a local anesthetic reservoir pump. For this method, one or more small, thin tubes (catheters) are inserted into your incision at the end of your procedure. These catheters are connected to a device that is filled with a non-narcotic pain medicine. Medicine gradually empties into your incision area over the next several days.      Continuous epidural pain control    With this method, you receive pain medicine through a catheter that is inserted into your back, near your spinal cord. Medicine flows through the catheter to lessen pain in areas of your body that are below the catheter. The catheter is usually put into the back shortly before surgery. It may be left in until you can eat, take medicine by mouth, pass urine, and have a bowel movement.    This method may be recommended if you are having surgery on your abdomen, hip area, or legs. This method of pain relief may help you heal faster because you may be able to do these things sooner:  •Regain normal bowel and bladder function.      •Return to eating.      •Get up and walk.      IV patient-controlled analgesia (PCA) pump    With this method, you receive pain medicine through an IV that is connected to a PCA pump. The PCA pump gives you a specific amount of medicine when you push a button. This lets you control how much medicine you receive. You are the only person who should push this button. The pump is set up so that you cannot accidentally give yourself too much medicine.    You will be able to start using your PCA pump in the recovery room after your procedure. Tell your health care provider:  •If you are having too much pain.      •If you cannot push the button.      •If you are feeling too sleepy or nauseous.      Other pain control methods    Other methods of pain relief after surgery include:  •Heat and cold therapy.      •Massage.      •Topical analgesics. These are patches, creams, and gels that can be applied on the skin.      •Steroid medicines. These medicines may be given to lessen swelling.      •Physical therapy. A physical therapist will work with you to meet goals, such as feeling and functioning better. Physical therapy usually includes specific exercises that are tailored to your needs.      •Transcutaneous electrical nerve stimulation (TENS). This method sends electrical signals through the skin to interrupt pain signals.      •Cognitive behavioral therapy (CBT). This therapy helps you learn coping skills for dealing with pain.        What are some questions to ask my health care provider?    •What pain relief options would be best for me?      •What are the risks of each option?      •What are the benefits of each option?      •How long will I need pain relief after surgery?        Summary    •A plan to manage pain that you may have before surgery (preoperative) and after surgery (postoperative) is an important part of your overall care.      •Pain management options include medicines and nonmedical therapies, such as physical therapy, massage, and heat or cold therapy.      •Pain management medicines include opioids and non-narcotic medicines such as NSAIDs, steroids, or local anesthetics.      •Pain medicines can have side effects. Side effects of opioids include constipation, nausea, excessive sleepiness, and risk of addiction. Your health care provider will work with you to prevent or manage these side effects and risks.      This information is not intended to replace advice given to you by your health care provider. Make sure you discuss any questions you have with your health care provider.

## 2022-06-07 NOTE — ED ADULT NURSE NOTE - NSIMPLEMENTINTERV_GEN_ALL_ED
Implemented All Fall Risk Interventions:  [place] to call system. Call bell, personal items and telephone within reach. Instruct patient to call for assistance. Room bathroom lighting operational. Non-slip footwear when patient is off stretcher. Physically safe environment: no spills, clutter or unnecessary equipment. Stretcher in lowest position, wheels locked, appropriate side rails in place. Provide visual cue, wrist band, yellow gown, etc. Monitor gait and stability. Monitor for mental status changes and reorient to person, place, and time. Review medications for side effects contributing to fall risk. Reinforce activity limits and safety measures with patient and family.

## 2022-06-07 NOTE — ED PROVIDER NOTE - PATIENT PORTAL LINK FT
You can access the FollowMyHealth Patient Portal offered by [facility] by registering at the following website: [email or web address]. By joining [employer]’s FollowMyHealth portal, you will also be able to view your health information using other applications (apps) compatible with our system.

## 2022-06-07 NOTE — ED PROVIDER NOTE - MUSCULOSKELETAL, MLM
right LE - knee immobilizer attached, knee flexed at 30deg, knee appears mildly swollen, with mild generalized ttp, no distal edema, normal ROM toes, no ttp distal LE

## 2022-06-07 NOTE — ED PROVIDER NOTE - CLINICAL SUMMARY MEDICAL DECISION MAKING FREE TEXT BOX
51 y.o. M had right medial meniscal arthroscopic surgery about 12hr ago, c/o significant pain in knee, per patient, procedure was more complex and surgeon unable to repair tear, pain was immediate on waking and has only worsened with pt loosening knee immobilizer to bend knee, wounds appear intact, no sign of infection, pain likely post op pain that is inadequately controlled by tylenol, pt unable to take NSAIDS (states due to CLL), will provide stronger pain medication in ED, parenteral as will be easier to titrate, if pain adequately controlled, will attempt to have patient straighten knee for immobilizer, will try to reach out to patient's ortho - if pain controlled and knee immobilizer back in place, will dc and pt to f/u with ortho in am

## 2022-06-07 NOTE — ED ADULT TRIAGE NOTE - CHIEF COMPLAINT QUOTE
s/p right knee surgery 06/06/22 for torn meniscus; now c/o increased right knee pain s/p right knee surgery 06/06/22 for torn meniscus; now c/o increased right knee pain; took Tramadol @ 2330; took Tylenol @ 2230

## 2022-06-07 NOTE — ED PROVIDER NOTE - PROGRESS NOTE DETAILS
spoke with pt's orthopedist, Dr. Fagan, who then spoke with pt directly, Dr. Fagan will call in a pain prescription for the patient, pt feels improved enough to be discharged home at this time

## 2022-06-09 NOTE — ED PROVIDER NOTE - MDM PATIENT STATEMENT FOR ADDL TREATMENT
[Negative] : Heme/Lymph [FreeTextEntry5] : s/p syncope [de-identified] : stable after lithium levels stabilized Patient with one or more new problems requiring additional work-up/treatment.

## 2022-06-20 NOTE — ED ADULT NURSE REASSESSMENT NOTE - NSFALLRSKINDICATORS_ED_ALL_ED
[FreeTextEntry1] : Shortly after foreign travel, Ovidio (and his brother) noted change in bowels and cramps.  He has been experiencing intermittent cramps since March, with the pain lasting anywhere from a few hours to a few days-weeks.  Since April, he would become constipated, then have some soft stools or daniel diarrhea.  At times, the stool is quite dark but not quite black.  He reports postprandial bloating, reflux, nausea, and sensation of food sticking in the stomach.  He has taken Prilosec with incomplete improvement.  He presented to ER, where CT scan revealed only a small nonobstructing kidney stone.  He tried a gluten-free and lactose-free diet, transiently improved, only to have relapse.  Labs revealed elevated transglutaminase-IgG, and mildly elevated urine coproporphyrin I.  He denies fever, night sweats, weight loss, or extraintestinal manifestations of celiac disease or IBD. no

## 2022-07-13 RX ORDER — IBRUTINIB 140 MG/1
140 CAPSULE ORAL
Qty: 90 | Refills: 3 | Status: ACTIVE | COMMUNITY
Start: 2020-07-27 | End: 1900-01-01

## 2022-11-07 NOTE — ED ADULT NURSE NOTE - EXTENSIONS OF SELF_ADULT
None Fluconazole Counseling:  Patient counseled regarding adverse effects of fluconazole including but not limited to headache, diarrhea, nausea, upset stomach, liver function test abnormalities, taste disturbance, and stomach pain.  There is a rare possibility of liver failure that can occur when taking fluconazole.  The patient understands that monitoring of LFTs and kidney function test may be required, especially at baseline. The patient verbalized understanding of the proper use and possible adverse effects of fluconazole.  All of the patient's questions and concerns were addressed.

## 2022-11-24 LAB
ALBUMIN SERPL ELPH-MCNC: 4.6 G/DL
ALP BLD-CCNC: 58 U/L
ALT SERPL-CCNC: 22 U/L
ANION GAP SERPL CALC-SCNC: 11 MMOL/L
AST SERPL-CCNC: 21 U/L
BASOPHILS # BLD AUTO: 0.08 K/UL
BASOPHILS NFR BLD AUTO: 1 %
BILIRUB SERPL-MCNC: 0.6 MG/DL
BUN SERPL-MCNC: 16 MG/DL
CALCIUM SERPL-MCNC: 9.7 MG/DL
CHLORIDE SERPL-SCNC: 101 MMOL/L
CO2 SERPL-SCNC: 28 MMOL/L
CREAT SERPL-MCNC: 1.04 MG/DL
EGFR: 87 ML/MIN/1.73M2
EOSINOPHIL # BLD AUTO: 0.05 K/UL
EOSINOPHIL NFR BLD AUTO: 0.6 %
GLUCOSE SERPL-MCNC: 80 MG/DL
HCT VFR BLD CALC: 45.3 %
HGB BLD-MCNC: 14.5 G/DL
IMM GRANULOCYTES NFR BLD AUTO: 0.1 %
LDH SERPL-CCNC: 133 U/L
LYMPHOCYTES # BLD AUTO: 3.66 K/UL
LYMPHOCYTES NFR BLD AUTO: 44.6 %
MAGNESIUM SERPL-MCNC: 1.7 MG/DL
MAN DIFF?: NORMAL
MCHC RBC-ENTMCNC: 27.5 PG
MCHC RBC-ENTMCNC: 32 GM/DL
MCV RBC AUTO: 85.8 FL
MONOCYTES # BLD AUTO: 0.68 K/UL
MONOCYTES NFR BLD AUTO: 8.3 %
NEUTROPHILS # BLD AUTO: 3.73 K/UL
NEUTROPHILS NFR BLD AUTO: 45.4 %
PHOSPHATE SERPL-MCNC: 4 MG/DL
PLATELET # BLD AUTO: 196 K/UL
POTASSIUM SERPL-SCNC: 4.2 MMOL/L
PROT SERPL-MCNC: 6.7 G/DL
RBC # BLD: 5.28 M/UL
RBC # FLD: 13.1 %
SODIUM SERPL-SCNC: 140 MMOL/L
URATE SERPL-MCNC: 4.7 MG/DL
WBC # FLD AUTO: 8.21 K/UL

## 2022-11-28 ENCOUNTER — APPOINTMENT (OUTPATIENT)
Dept: HEMATOLOGY ONCOLOGY | Facility: CLINIC | Age: 52
End: 2022-11-28

## 2022-11-28 PROCEDURE — 99214 OFFICE O/P EST MOD 30 MIN: CPT | Mod: GC,95

## 2022-11-28 NOTE — REASON FOR VISIT
[Follow-Up Visit] : a follow-up visit for [CLL] : chronic lymphocytic leukemia [Other Location: e.g. School (Enter Location, City,State)___] : at [unfilled], at the time of the visit. [Medical Office: ([facility])___] : at the medical office located in  [Verbal consent obtained from patient] : the patient, [unfilled]

## 2022-12-02 NOTE — PHYSICAL EXAM
[Fully active, able to carry on all pre-disease performance without restriction] : Status 0 - Fully active, able to carry on all pre-disease performance without restriction [Normal] : affect appropriate [de-identified] : aox3

## 2022-12-02 NOTE — HISTORY OF PRESENT ILLNESS
[Disease:__________________________] : Disease: [unfilled] [de-identified] : Mr. Hewitt is a a 52 year old male with CLL (dx 2006), who presents for follow-up shortly after dealing with COVID infection despite being fully vaccinated and with a booster.\par He responded well to ibrutinib (started on Monday June 22, 2020) but this was shortly on hold due to painless gross hematuria, likely related to ibrutinib use as all work-up was negative. While off therapy, he became anemic and thrombocytopenic, so we resumed ibrutinib shortly after.\par He has not had any more episodes where he notes blood in the urine. He denies any new dysuria, hematuria, frequency, or  burning when he urinates.\par No recurrence of hematuria, but he has noted new mild arthralgias that are not currently affecting QOL.\par \par He is back at work after receiving the monoclonal antibodies. He also reports that one night he felt very febrile and unable to move, but by the morning all the symptoms subsided and he had no more issues. [de-identified] : CLL Markers:\par normal FISH\par CD38+\par unmutated.  [de-identified] : 11/28/22: Fatigue is stable. He is still able to complete his tasks at work but feels more tired by the end of the day. He is on reduced dose of ibrutinib (280mg daily) due to arthralgias; the arthralgias have improved. Denies fevers/chills, night sweats, weight loss. Also denies chest pain, SOB, diarrhea, rash, easy bruising/bleeding.

## 2022-12-02 NOTE — RESULTS/DATA
[FreeTextEntry1] : cbc with diff 11/24/22\par \par wbc- 8.21\par hgb- 14.5\par hct- 45.3\par plt- 196\par alc- 3.66\par anc- 3.73

## 2022-12-02 NOTE — ASSESSMENT
[FreeTextEntry1] : Mr. Hewitt is a 51 year old male CLL (unmutated IGHV), started ibrutinib June 22, 2020, briefly on hold due to  painless hematuria, which was worked-up and has since resolved. He recently recovered from COVID infection, s/p monoclonal antibody infusion. He dose reduced ibruitnib to 280 mg daily due to arthralgias\par \par Plan:\par 1)CLL\par - continue ibrutinib (280mg daily dose reduced 2/2 arthalgias which is now improved)\par - recent labs from 11/24/22 reviewed plt are now normal\par - If continued fatigue/worsening counts will consider increase ibrutinib back to 420mg daily vs switching to acalabrutinib which may have better side effect profile (fewer arthralgias)\par - we also discussed elana, information packet emailed to patient for him to review\par \par 2)Hypogammaglobulinemia: continue home IVIG\par \par 3): Enlarged prostate, f/u w  as needed\par \par 4)continue routine health maintenance and age appropriate screening as indicated. Reminded patient to f/u with internist for regular follow-up.\par \par RTO in 3 months\par

## 2023-04-10 NOTE — END OF VISIT
[Time Spent: ___ minutes] : I have spent [unfilled] minutes of time on the encounter.
sent for r/o ectopic. patient denies pain s/p D and C 4/5

## 2023-05-16 ENCOUNTER — OUTPATIENT (OUTPATIENT)
Dept: OUTPATIENT SERVICES | Facility: HOSPITAL | Age: 53
LOS: 1 days | Discharge: HOME | End: 2023-05-16

## 2023-05-16 DIAGNOSIS — D64.9 ANEMIA, UNSPECIFIED: ICD-10-CM

## 2023-05-22 ENCOUNTER — RESULT REVIEW (OUTPATIENT)
Age: 53
End: 2023-05-22

## 2023-05-22 ENCOUNTER — APPOINTMENT (OUTPATIENT)
Dept: HEMATOLOGY ONCOLOGY | Facility: CLINIC | Age: 53
End: 2023-05-22
Payer: COMMERCIAL

## 2023-05-22 VITALS
WEIGHT: 182.32 LBS | RESPIRATION RATE: 16 BRPM | SYSTOLIC BLOOD PRESSURE: 131 MMHG | HEART RATE: 67 BPM | BODY MASS INDEX: 24.73 KG/M2 | OXYGEN SATURATION: 97 % | DIASTOLIC BLOOD PRESSURE: 75 MMHG

## 2023-05-22 LAB
BASOPHILS # BLD AUTO: 0.06 K/UL — SIGNIFICANT CHANGE UP (ref 0–0.2)
BASOPHILS NFR BLD AUTO: 0.6 % — SIGNIFICANT CHANGE UP (ref 0–2)
EOSINOPHIL # BLD AUTO: 0.07 K/UL — SIGNIFICANT CHANGE UP (ref 0–0.5)
EOSINOPHIL NFR BLD AUTO: 0.8 % — SIGNIFICANT CHANGE UP (ref 0–6)
HCT VFR BLD CALC: 41 % — SIGNIFICANT CHANGE UP (ref 39–50)
HGB BLD-MCNC: 14.1 G/DL — SIGNIFICANT CHANGE UP (ref 13–17)
IMM GRANULOCYTES NFR BLD AUTO: 0.3 % — SIGNIFICANT CHANGE UP (ref 0–0.9)
LYMPHOCYTES # BLD AUTO: 3.31 K/UL — HIGH (ref 1–3.3)
LYMPHOCYTES # BLD AUTO: 35.7 % — SIGNIFICANT CHANGE UP (ref 13–44)
MCHC RBC-ENTMCNC: 28 PG — SIGNIFICANT CHANGE UP (ref 27–34)
MCHC RBC-ENTMCNC: 34.4 G/DL — SIGNIFICANT CHANGE UP (ref 32–36)
MCV RBC AUTO: 81.5 FL — SIGNIFICANT CHANGE UP (ref 80–100)
MONOCYTES # BLD AUTO: 0.68 K/UL — SIGNIFICANT CHANGE UP (ref 0–0.9)
MONOCYTES NFR BLD AUTO: 7.3 % — SIGNIFICANT CHANGE UP (ref 2–14)
NEUTROPHILS # BLD AUTO: 5.12 K/UL — SIGNIFICANT CHANGE UP (ref 1.8–7.4)
NEUTROPHILS NFR BLD AUTO: 55.3 % — SIGNIFICANT CHANGE UP (ref 43–77)
NRBC # BLD: 0 /100 WBCS — SIGNIFICANT CHANGE UP (ref 0–0)
PLATELET # BLD AUTO: 194 K/UL — SIGNIFICANT CHANGE UP (ref 150–400)
RBC # BLD: 5.03 M/UL — SIGNIFICANT CHANGE UP (ref 4.2–5.8)
RBC # FLD: 12.6 % — SIGNIFICANT CHANGE UP (ref 10.3–14.5)
WBC # BLD: 9.27 K/UL — SIGNIFICANT CHANGE UP (ref 3.8–10.5)
WBC # FLD AUTO: 9.27 K/UL — SIGNIFICANT CHANGE UP (ref 3.8–10.5)

## 2023-05-22 PROCEDURE — 99215 OFFICE O/P EST HI 40 MIN: CPT

## 2023-05-22 NOTE — ASSESSMENT
[FreeTextEntry1] : Assessment: 52 year old prescribed low dose imbruvica for FISH normal, IGVH unmutated CLL.  Course complicated by hypogammaglobinemia. \par \par Onc History: imbruvica (6/22/20 - ), [id number] therapy\par \par PMHx: anxiety, HTN, HLD\par \par Plan:\par Heme: discussed time limited therapy with venetoclax.\par Discussed long-term complication of BTK inhibition: cardiovascular and IgG effects. \par \par ID: discussed SqIg; hold IVIg repeat serum IgG levels in early July.  \par \par Over 55 minutes were spent in direct patient care with greater than 90% discussing his current medications. \par \par \par \par \par \par

## 2023-05-22 NOTE — HISTORY OF PRESENT ILLNESS
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 \par \par Tony does not spontaneously report: fever, chills, sweats, weight loss, skin rashes, petechiae, bruising, eye irritation, hearing loss, mouth sores, sore throat, cough, hemoptysis, pleuritic chest pain, dyspnea, change in vision, focal numbness/weakness, chest pains, palpitations, nausea, vomiting, diarrhea, constipation, dysuria, hematuria, bowel or bladder incontinence, sever joint pain, back pain or gait disturbance.\par \par Medications:\par imbruvica 280mg daily\par monthly (home) IVIG last infused two weeks ago\par acyclovir 400 qd\par atorvastatin 20mg daily\par lisinopril 10mg daily\par sertraline 200mg  daily\par \par Examination:\par Tony is articulate and in no acute distress\par No occiput, poster cervical, anterior cervical, submandibular, sublingual, submental, supraclavicular nor axillary adenopathy\par No spinal, paraspinal, nor CVA tenderness\par Lungs: Clear\par Cardiac: without rubs\par Abd: soft and non-tender, Traube's space is tympanitic\par No inguinal nor femoral adenopathy\par No sig peripheral edema\par Gait: unencumbered \par \par CBC trend:\par 05/22/23: WBC 9.3, Hgb 14.1, ,000\par \par \par \par \par \par \par \par

## 2023-05-24 LAB
ANION GAP SERPL CALC-SCNC: 13 MMOL/L
BUN SERPL-MCNC: 20 MG/DL
CALCIUM SERPL-MCNC: 9.8 MG/DL
CHLORIDE SERPL-SCNC: 100 MMOL/L
CO2 SERPL-SCNC: 25 MMOL/L
CREAT SERPL-MCNC: 1.03 MG/DL
EGFR: 88 ML/MIN/1.73M2
GLUCOSE SERPL-MCNC: 89 MG/DL
POTASSIUM SERPL-SCNC: 4.2 MMOL/L
SODIUM SERPL-SCNC: 138 MMOL/L

## 2023-06-26 ENCOUNTER — LABORATORY RESULT (OUTPATIENT)
Age: 53
End: 2023-06-26

## 2023-06-29 ENCOUNTER — APPOINTMENT (OUTPATIENT)
Dept: HEMATOLOGY ONCOLOGY | Facility: CLINIC | Age: 53
End: 2023-06-29
Payer: COMMERCIAL

## 2023-06-29 DIAGNOSIS — C91.10 CHRONIC LYMPHOCYTIC LEUKEMIA OF B-CELL TYPE NOT HAVING ACHIEVED REMISSION: ICD-10-CM

## 2023-06-29 PROCEDURE — 99212 OFFICE O/P EST SF 10 MIN: CPT | Mod: 95

## 2023-06-29 PROCEDURE — 99202 OFFICE O/P NEW SF 15 MIN: CPT | Mod: 95

## 2023-06-30 NOTE — ASSESSMENT
[FreeTextEntry1] : Assessment: 52 year old prescribed low dose imbruvica for FISH normal, IGVH unmutated CLL.  Course complicated by hypogammaglobinemia. \par \par Onc History: imbruvica (6/22/20 - ), [id number] therapy\par \par PMHx: anxiety, HTN, HLD\par \par Plan:\par Heme: re-discussed time limited therapy with venetoclax. Clonoseq tracking is not available.  \par Discussed long-term complication of BTK inhibition: cardiovascular and IgG effects. \par \par ID: discuseed that his IgG level is near 500 and that travel restrictions are not required.  Discussed that if he wished I would prescribe one more infusion of IVIg before his trip to [facility].  But focused more on changing therapy to venetoclax.  \par \par Over 15 minutes were spent in direct patient care discussing Ig levels and venetoclax.  \par \par \par \par \par \par

## 2023-06-30 NOTE — HISTORY OF PRESENT ILLNESS
[de-identified] : Tony was last seen: 5/22/23\par This is a telehealth visit.\par I am at [facility].\par He is at work.  His wife is on the line. \par Consent given for visit. \par \par  [de-identified] : Reason for visit: CLL questions\par \par Since last visit: Tony has felt well. \par \par \par Tony does not spontaneously report: fever, chills, sweats, weight loss, skin rashes, petechiae, bruising, eye irritation, hearing loss, mouth sores, sore throat, cough, hemoptysis, pleuritic chest pain, dyspnea, change in vision, focal numbness/weakness, chest pains, palpitations, nausea, vomiting, diarrhea, constipation, dysuria, hematuria, bowel or bladder incontinence, sever joint pain, back pain or gait disturbance.\par \par Medications:\par imbruvica 280mg daily\par monthly (home) IVIG last infused two weeks ago\par acyclovir 400 qd\par atorvastatin 20mg daily\par lisinopril 10mg daily\par sertraline 200mg  daily\par \par Examination (at last visit):\par Tony is articulate and in no acute distress\par No occiput, poster cervical, anterior cervical, submandibular, sublingual, submental, supraclavicular nor axillary adenopathy\par No spinal, paraspinal, nor CVA tenderness\par Lungs: Clear\par Cardiac: without rubs\par Abd: soft and non-tender, Traube's space is tympanitic\par No inguinal nor femoral adenopathy\par No sig peripheral edema\par Gait: unencumbered \par \par CBC trend:\par 05/22/23: WBC 9.3, Hgb 14.1, ,000\par 06/27/23: WBC 8.6, Hgb 13.9, ,000, IgG 453\par

## 2023-12-15 ENCOUNTER — APPOINTMENT (OUTPATIENT)
Dept: UROLOGY | Facility: CLINIC | Age: 53
End: 2023-12-15

## 2024-04-01 NOTE — ED ADULT NURSE NOTE - NS_SISCREENINGSR_GEN_ALL_ED
Paroxysmal atrial fibrillation status post ablation.  Not anticoagulation.  Continue diltiazem CD at bedtime   Negative

## 2024-05-14 NOTE — DISCHARGE NOTE NURSING/CASE MANAGEMENT/SOCIAL WORK - NURSING SECTION COMPLETE
[FreeTextEntry8] : 59-year-old male for left foot pain.  Patient states that discomfort seems to be improved.  He did see Ortho and had x-ray done.  Also had Doppler done.  Concerned about gout as he did experience an episode in the past. Patient/Caregiver provided printed discharge information.

## 2024-08-01 NOTE — ED ADULT NURSE NOTE - NS ED NURSE LEVEL OF CONSCIOUSNESS AFFECT
----- Message from Lesvia Roman [phone]  Additional Information:     Janet stated she missed a call and would like a call back please   Appropriate

## 2024-11-12 NOTE — ED ADULT NURSE NOTE - TEMPLATE LIST FOR HEAD TO TOE ASSESSMENT

## 2025-01-02 ENCOUNTER — APPOINTMENT (OUTPATIENT)
Dept: MRI IMAGING | Facility: CLINIC | Age: 55
End: 2025-01-02

## 2025-01-02 ENCOUNTER — APPOINTMENT (OUTPATIENT)
Dept: CT IMAGING | Facility: CLINIC | Age: 55
End: 2025-01-02

## 2025-01-02 ENCOUNTER — OUTPATIENT (OUTPATIENT)
Dept: OUTPATIENT SERVICES | Facility: HOSPITAL | Age: 55
LOS: 1 days | End: 2025-01-02
Payer: COMMERCIAL

## 2025-01-02 DIAGNOSIS — Z00.8 ENCOUNTER FOR OTHER GENERAL EXAMINATION: ICD-10-CM

## 2025-01-02 PROCEDURE — 71260 CT THORAX DX C+: CPT | Mod: 26

## 2025-01-02 PROCEDURE — 74183 MRI ABD W/O CNTR FLWD CNTR: CPT

## 2025-01-02 PROCEDURE — 74183 MRI ABD W/O CNTR FLWD CNTR: CPT | Mod: 26

## 2025-01-02 PROCEDURE — 74177 CT ABD & PELVIS W/CONTRAST: CPT | Mod: 26

## 2025-01-02 PROCEDURE — A9585: CPT

## 2025-01-02 PROCEDURE — 74177 CT ABD & PELVIS W/CONTRAST: CPT

## 2025-01-02 PROCEDURE — 71260 CT THORAX DX C+: CPT

## 2025-02-25 NOTE — ED ADULT NURSE NOTE - HAVE YOU HAD A FIRST COVID-19 BOOSTER?
Continue same medications/treatment.  Patient educated on proper medication use.  Patient educated on risk factor modification.  Please bring any lab results from other providers/physicians to your next appointment.    Please bring all medicines, vitamins, and herbal supplements with you when you come to the office.    Prescriptions will not be filled unless you are compliant with your follow up appointments or have a follow up appointment scheduled as per instruction of your physician. Refills should be requested at the time of your visit.    Follow up in after testing  ECHO, MPX stress test, 2 week Ziopatch monitor    I, CHINA GARIBAY RN, AM SCRIBING FOR AND IN THE PRESENCE OF DR. EMLANIE TYSON, DO, FACC   
Yes
